# Patient Record
Sex: FEMALE | Race: WHITE | Employment: UNEMPLOYED | URBAN - NONMETROPOLITAN AREA
[De-identification: names, ages, dates, MRNs, and addresses within clinical notes are randomized per-mention and may not be internally consistent; named-entity substitution may affect disease eponyms.]

---

## 2017-01-04 ENCOUNTER — ANTI-COAG VISIT (OUTPATIENT)
Dept: INTERNAL MEDICINE | Age: 82
End: 2017-01-04

## 2017-01-16 ENCOUNTER — ANTI-COAG VISIT (OUTPATIENT)
Dept: INTERNAL MEDICINE | Age: 82
End: 2017-01-16

## 2017-01-16 DIAGNOSIS — E05.90 HYPERTHYROIDISM: Primary | ICD-10-CM

## 2017-01-18 ENCOUNTER — OFFICE VISIT (OUTPATIENT)
Dept: INTERNAL MEDICINE | Age: 82
End: 2017-01-18

## 2017-01-18 VITALS
SYSTOLIC BLOOD PRESSURE: 130 MMHG | HEIGHT: 67 IN | DIASTOLIC BLOOD PRESSURE: 58 MMHG | WEIGHT: 151.8 LBS | BODY MASS INDEX: 23.83 KG/M2 | HEART RATE: 76 BPM

## 2017-01-18 DIAGNOSIS — I10 ESSENTIAL HYPERTENSION: ICD-10-CM

## 2017-01-18 DIAGNOSIS — I87.2 CHRONIC VENOUS INSUFFICIENCY: ICD-10-CM

## 2017-01-18 DIAGNOSIS — I34.0 MITRAL VALVE INSUFFICIENCY, UNSPECIFIED ETIOLOGY: Chronic | ICD-10-CM

## 2017-01-18 DIAGNOSIS — E78.5 DYSLIPIDEMIA: ICD-10-CM

## 2017-01-18 DIAGNOSIS — M85.80 OSTEOPENIA: ICD-10-CM

## 2017-01-18 DIAGNOSIS — I42.9 CARDIOMYOPATHY (HCC): ICD-10-CM

## 2017-01-18 DIAGNOSIS — I25.810 CORONARY ARTERY DISEASE INVOLVING CORONARY BYPASS GRAFT OF NATIVE HEART WITHOUT ANGINA PECTORIS: Chronic | ICD-10-CM

## 2017-01-18 DIAGNOSIS — E11.21 DIABETIC NEPHROPATHY ASSOCIATED WITH TYPE 2 DIABETES MELLITUS (HCC): ICD-10-CM

## 2017-01-18 DIAGNOSIS — E05.90 HYPERTHYROIDISM: Primary | ICD-10-CM

## 2017-01-18 DIAGNOSIS — E11.51 DIABETES MELLITUS WITH PERIPHERAL VASCULAR DISEASE (HCC): ICD-10-CM

## 2017-01-18 DIAGNOSIS — Z23 NEED FOR PROPHYLACTIC VACCINATION AGAINST STREPTOCOCCUS PNEUMONIAE (PNEUMOCOCCUS): ICD-10-CM

## 2017-01-18 DIAGNOSIS — I77.9 BILATERAL CAROTID ARTERY DISEASE (HCC): ICD-10-CM

## 2017-01-18 DIAGNOSIS — I48.20 CHRONIC ATRIAL FIBRILLATION (HCC): ICD-10-CM

## 2017-01-18 PROCEDURE — 4040F PNEUMOC VAC/ADMIN/RCVD: CPT | Performed by: INTERNAL MEDICINE

## 2017-01-18 PROCEDURE — G8598 ASA/ANTIPLAT THER USED: HCPCS | Performed by: INTERNAL MEDICINE

## 2017-01-18 PROCEDURE — G8400 PT W/DXA NO RESULTS DOC: HCPCS | Performed by: INTERNAL MEDICINE

## 2017-01-18 PROCEDURE — 99214 OFFICE O/P EST MOD 30 MIN: CPT | Performed by: INTERNAL MEDICINE

## 2017-01-18 PROCEDURE — G8420 CALC BMI NORM PARAMETERS: HCPCS | Performed by: INTERNAL MEDICINE

## 2017-01-18 PROCEDURE — 1036F TOBACCO NON-USER: CPT | Performed by: INTERNAL MEDICINE

## 2017-01-18 PROCEDURE — 1090F PRES/ABSN URINE INCON ASSESS: CPT | Performed by: INTERNAL MEDICINE

## 2017-01-18 PROCEDURE — G0009 ADMIN PNEUMOCOCCAL VACCINE: HCPCS | Performed by: INTERNAL MEDICINE

## 2017-01-18 PROCEDURE — G8484 FLU IMMUNIZE NO ADMIN: HCPCS | Performed by: INTERNAL MEDICINE

## 2017-01-18 PROCEDURE — 90732 PPSV23 VACC 2 YRS+ SUBQ/IM: CPT | Performed by: INTERNAL MEDICINE

## 2017-01-18 PROCEDURE — 1123F ACP DISCUSS/DSCN MKR DOCD: CPT | Performed by: INTERNAL MEDICINE

## 2017-01-18 PROCEDURE — G8427 DOCREV CUR MEDS BY ELIG CLIN: HCPCS | Performed by: INTERNAL MEDICINE

## 2017-01-18 ASSESSMENT — ENCOUNTER SYMPTOMS
WHEEZING: 0
EYE PAIN: 0
NAUSEA: 0
DIARRHEA: 0
DOUBLE VISION: 0
ABDOMINAL PAIN: 0
BLOOD IN STOOL: 0
CONSTIPATION: 0
SHORTNESS OF BREATH: 0
BLURRED VISION: 0
SORE THROAT: 0
VOMITING: 0
COUGH: 0
EYE DISCHARGE: 0

## 2017-01-25 ENCOUNTER — ANTI-COAG VISIT (OUTPATIENT)
Dept: INTERNAL MEDICINE | Age: 82
End: 2017-01-25

## 2017-01-25 DIAGNOSIS — E05.90 HYPERTHYROIDISM: Primary | ICD-10-CM

## 2017-01-31 ENCOUNTER — ANTI-COAG VISIT (OUTPATIENT)
Dept: INTERNAL MEDICINE | Age: 82
End: 2017-01-31

## 2017-01-31 DIAGNOSIS — E05.90 HYPERTHYROIDISM: Primary | ICD-10-CM

## 2017-02-06 ENCOUNTER — ANTI-COAG VISIT (OUTPATIENT)
Dept: INTERNAL MEDICINE | Age: 82
End: 2017-02-06

## 2017-02-13 ENCOUNTER — ANTI-COAG VISIT (OUTPATIENT)
Dept: INTERNAL MEDICINE | Age: 82
End: 2017-02-13

## 2017-02-20 ENCOUNTER — ANTI-COAG VISIT (OUTPATIENT)
Dept: INTERNAL MEDICINE | Age: 82
End: 2017-02-20

## 2017-02-22 ENCOUNTER — OFFICE VISIT (OUTPATIENT)
Dept: CARDIOLOGY | Age: 82
End: 2017-02-22

## 2017-02-22 VITALS
BODY MASS INDEX: 25.95 KG/M2 | HEART RATE: 84 BPM | DIASTOLIC BLOOD PRESSURE: 62 MMHG | HEIGHT: 64 IN | SYSTOLIC BLOOD PRESSURE: 112 MMHG | WEIGHT: 152 LBS

## 2017-02-22 DIAGNOSIS — I25.810 CORONARY ARTERY DISEASE INVOLVING CORONARY BYPASS GRAFT OF NATIVE HEART WITHOUT ANGINA PECTORIS: Primary | Chronic | ICD-10-CM

## 2017-02-22 DIAGNOSIS — I48.20 CHRONIC ATRIAL FIBRILLATION (HCC): ICD-10-CM

## 2017-02-22 DIAGNOSIS — Z95.1 S/P CABG (CORONARY ARTERY BYPASS GRAFT): ICD-10-CM

## 2017-02-22 DIAGNOSIS — E78.5 DYSLIPIDEMIA: ICD-10-CM

## 2017-02-22 DIAGNOSIS — I49.5 SSS (SICK SINUS SYNDROME) (HCC): ICD-10-CM

## 2017-02-22 PROCEDURE — 4040F PNEUMOC VAC/ADMIN/RCVD: CPT | Performed by: INTERNAL MEDICINE

## 2017-02-22 PROCEDURE — 1090F PRES/ABSN URINE INCON ASSESS: CPT | Performed by: INTERNAL MEDICINE

## 2017-02-22 PROCEDURE — 1123F ACP DISCUSS/DSCN MKR DOCD: CPT | Performed by: INTERNAL MEDICINE

## 2017-02-22 PROCEDURE — 1036F TOBACCO NON-USER: CPT | Performed by: INTERNAL MEDICINE

## 2017-02-22 PROCEDURE — 93000 ELECTROCARDIOGRAM COMPLETE: CPT | Performed by: INTERNAL MEDICINE

## 2017-02-22 PROCEDURE — G8484 FLU IMMUNIZE NO ADMIN: HCPCS | Performed by: INTERNAL MEDICINE

## 2017-02-22 PROCEDURE — G8420 CALC BMI NORM PARAMETERS: HCPCS | Performed by: INTERNAL MEDICINE

## 2017-02-22 PROCEDURE — G8400 PT W/DXA NO RESULTS DOC: HCPCS | Performed by: INTERNAL MEDICINE

## 2017-02-22 PROCEDURE — G8427 DOCREV CUR MEDS BY ELIG CLIN: HCPCS | Performed by: INTERNAL MEDICINE

## 2017-02-22 PROCEDURE — 99214 OFFICE O/P EST MOD 30 MIN: CPT | Performed by: INTERNAL MEDICINE

## 2017-02-22 PROCEDURE — G8598 ASA/ANTIPLAT THER USED: HCPCS | Performed by: INTERNAL MEDICINE

## 2017-03-06 ENCOUNTER — ANTI-COAG VISIT (OUTPATIENT)
Dept: INTERNAL MEDICINE | Age: 82
End: 2017-03-06

## 2017-03-06 DIAGNOSIS — E05.90 HYPERTHYROIDISM: Primary | ICD-10-CM

## 2017-03-23 ENCOUNTER — PROCEDURE VISIT (OUTPATIENT)
Dept: CARDIOLOGY | Age: 82
End: 2017-03-23
Payer: MEDICARE

## 2017-03-23 ENCOUNTER — ANTI-COAG VISIT (OUTPATIENT)
Dept: INTERNAL MEDICINE | Age: 82
End: 2017-03-23

## 2017-03-23 DIAGNOSIS — I48.20 CHRONIC ATRIAL FIBRILLATION (HCC): ICD-10-CM

## 2017-03-23 DIAGNOSIS — Z95.810 PRESENCE OF AUTOMATIC CARDIOVERTER/DEFIBRILLATOR (AICD): Primary | ICD-10-CM

## 2017-03-23 DIAGNOSIS — I49.5 SSS (SICK SINUS SYNDROME) (HCC): ICD-10-CM

## 2017-03-23 DIAGNOSIS — I50.9 CONGESTIVE HEART FAILURE, UNSPECIFIED CONGESTIVE HEART FAILURE CHRONICITY, UNSPECIFIED CONGESTIVE HEART FAILURE TYPE: ICD-10-CM

## 2017-03-23 PROCEDURE — 93289 INTERROG DEVICE EVAL HEART: CPT | Performed by: INTERNAL MEDICINE

## 2017-04-03 RX ORDER — SIMVASTATIN 80 MG
TABLET ORAL
Qty: 90 TABLET | Refills: 3 | Status: SHIPPED | OUTPATIENT
Start: 2017-04-03 | End: 2018-03-29 | Stop reason: SDUPTHER

## 2017-04-05 ENCOUNTER — ANTI-COAG VISIT (OUTPATIENT)
Dept: INTERNAL MEDICINE | Age: 82
End: 2017-04-05

## 2017-04-19 ENCOUNTER — ANTI-COAG VISIT (OUTPATIENT)
Dept: INTERNAL MEDICINE | Age: 82
End: 2017-04-19

## 2017-04-20 ENCOUNTER — OFFICE VISIT (OUTPATIENT)
Dept: INTERNAL MEDICINE | Age: 82
End: 2017-04-20
Payer: MEDICARE

## 2017-04-20 VITALS
HEIGHT: 67 IN | BODY MASS INDEX: 24.17 KG/M2 | HEART RATE: 72 BPM | SYSTOLIC BLOOD PRESSURE: 110 MMHG | DIASTOLIC BLOOD PRESSURE: 60 MMHG | WEIGHT: 154 LBS

## 2017-04-20 DIAGNOSIS — I42.9 CARDIOMYOPATHY (HCC): ICD-10-CM

## 2017-04-20 DIAGNOSIS — E78.5 DYSLIPIDEMIA: ICD-10-CM

## 2017-04-20 DIAGNOSIS — M85.80 OSTEOPENIA: ICD-10-CM

## 2017-04-20 DIAGNOSIS — Z00.00 ROUTINE GENERAL MEDICAL EXAMINATION AT A HEALTH CARE FACILITY: ICD-10-CM

## 2017-04-20 DIAGNOSIS — I48.20 CHRONIC ATRIAL FIBRILLATION (HCC): ICD-10-CM

## 2017-04-20 DIAGNOSIS — I25.810 CORONARY ARTERY DISEASE INVOLVING CORONARY BYPASS GRAFT OF NATIVE HEART WITHOUT ANGINA PECTORIS: Chronic | ICD-10-CM

## 2017-04-20 DIAGNOSIS — D17.24 BENIGN LIPOMATOUS NEOPLASM OF SKIN AND SUBCUTANEOUS TISSUE OF LEFT LEG: ICD-10-CM

## 2017-04-20 DIAGNOSIS — I10 ESSENTIAL HYPERTENSION: ICD-10-CM

## 2017-04-20 DIAGNOSIS — I34.0 MITRAL VALVE INSUFFICIENCY, UNSPECIFIED ETIOLOGY: Chronic | ICD-10-CM

## 2017-04-20 DIAGNOSIS — E11.51 DIABETES MELLITUS WITH PERIPHERAL VASCULAR DISEASE (HCC): ICD-10-CM

## 2017-04-20 DIAGNOSIS — E27.8 ADRENAL MASS (HCC): ICD-10-CM

## 2017-04-20 DIAGNOSIS — E05.90 HYPERTHYROIDISM: Primary | ICD-10-CM

## 2017-04-20 DIAGNOSIS — I87.2 CHRONIC VENOUS INSUFFICIENCY: ICD-10-CM

## 2017-04-20 PROCEDURE — 1090F PRES/ABSN URINE INCON ASSESS: CPT | Performed by: INTERNAL MEDICINE

## 2017-04-20 PROCEDURE — 99214 OFFICE O/P EST MOD 30 MIN: CPT | Performed by: INTERNAL MEDICINE

## 2017-04-20 PROCEDURE — 4040F PNEUMOC VAC/ADMIN/RCVD: CPT | Performed by: INTERNAL MEDICINE

## 2017-04-20 PROCEDURE — G8598 ASA/ANTIPLAT THER USED: HCPCS | Performed by: INTERNAL MEDICINE

## 2017-04-20 PROCEDURE — G8427 DOCREV CUR MEDS BY ELIG CLIN: HCPCS | Performed by: INTERNAL MEDICINE

## 2017-04-20 PROCEDURE — 1123F ACP DISCUSS/DSCN MKR DOCD: CPT | Performed by: INTERNAL MEDICINE

## 2017-04-20 PROCEDURE — G8400 PT W/DXA NO RESULTS DOC: HCPCS | Performed by: INTERNAL MEDICINE

## 2017-04-20 PROCEDURE — G8420 CALC BMI NORM PARAMETERS: HCPCS | Performed by: INTERNAL MEDICINE

## 2017-04-20 PROCEDURE — 1036F TOBACCO NON-USER: CPT | Performed by: INTERNAL MEDICINE

## 2017-04-20 PROCEDURE — G0438 PPPS, INITIAL VISIT: HCPCS | Performed by: INTERNAL MEDICINE

## 2017-04-20 ASSESSMENT — ANXIETY QUESTIONNAIRES: GAD7 TOTAL SCORE: 0

## 2017-04-20 ASSESSMENT — PATIENT HEALTH QUESTIONNAIRE - PHQ9: SUM OF ALL RESPONSES TO PHQ QUESTIONS 1-9: 0

## 2017-04-20 ASSESSMENT — LIFESTYLE VARIABLES: HOW OFTEN DO YOU HAVE A DRINK CONTAINING ALCOHOL: 0

## 2017-04-23 ASSESSMENT — ENCOUNTER SYMPTOMS
WHEEZING: 0
VOMITING: 0
NAUSEA: 0
CONSTIPATION: 0
ABDOMINAL PAIN: 0
COUGH: 0
DOUBLE VISION: 0
EYE DISCHARGE: 0
SORE THROAT: 0
DIARRHEA: 0
BLOOD IN STOOL: 0
BLURRED VISION: 0
SHORTNESS OF BREATH: 0
EYE PAIN: 0

## 2017-05-03 ENCOUNTER — ANTI-COAG VISIT (OUTPATIENT)
Dept: INTERNAL MEDICINE | Age: 82
End: 2017-05-03

## 2017-05-04 ENCOUNTER — TELEPHONE (OUTPATIENT)
Dept: INTERNAL MEDICINE | Age: 82
End: 2017-05-04

## 2017-05-17 ENCOUNTER — ANTI-COAG VISIT (OUTPATIENT)
Dept: INTERNAL MEDICINE | Age: 82
End: 2017-05-17

## 2017-05-31 ENCOUNTER — ANTI-COAG VISIT (OUTPATIENT)
Dept: INTERNAL MEDICINE | Age: 82
End: 2017-05-31

## 2017-05-31 DIAGNOSIS — E05.90 HYPERTHYROIDISM: Primary | ICD-10-CM

## 2017-06-05 RX ORDER — WARFARIN SODIUM 3 MG/1
3 TABLET ORAL DAILY
Qty: 90 TABLET | Refills: 3 | Status: SHIPPED | OUTPATIENT
Start: 2017-06-05 | End: 2017-09-25 | Stop reason: SDUPTHER

## 2017-06-12 ENCOUNTER — TELEPHONE (OUTPATIENT)
Dept: INTERNAL MEDICINE | Age: 82
End: 2017-06-12

## 2017-06-14 ENCOUNTER — ANTI-COAG VISIT (OUTPATIENT)
Dept: INTERNAL MEDICINE | Age: 82
End: 2017-06-14

## 2017-06-21 ENCOUNTER — ANTI-COAG VISIT (OUTPATIENT)
Dept: INTERNAL MEDICINE | Age: 82
End: 2017-06-21

## 2017-06-28 ENCOUNTER — ANTI-COAG VISIT (OUTPATIENT)
Dept: INTERNAL MEDICINE | Age: 82
End: 2017-06-28

## 2017-07-05 ENCOUNTER — ANTI-COAG VISIT (OUTPATIENT)
Dept: INTERNAL MEDICINE | Age: 82
End: 2017-07-05

## 2017-07-17 ENCOUNTER — ANTI-COAG VISIT (OUTPATIENT)
Dept: INTERNAL MEDICINE | Age: 82
End: 2017-07-17

## 2017-07-17 RX ORDER — DIGOXIN 125 MCG
125 TABLET ORAL DAILY
Qty: 14 TABLET | Refills: 0 | Status: SHIPPED | OUTPATIENT
Start: 2017-07-17 | End: 2017-07-19 | Stop reason: SDUPTHER

## 2017-07-18 RX ORDER — DIGOXIN 125 MCG
TABLET ORAL
Qty: 90 TABLET | Refills: 2 | OUTPATIENT
Start: 2017-07-18

## 2017-07-19 RX ORDER — DIGOXIN 125 MCG
125 TABLET ORAL DAILY
Qty: 90 TABLET | Refills: 3 | Status: SHIPPED | OUTPATIENT
Start: 2017-07-19 | End: 2018-01-01 | Stop reason: SDUPTHER

## 2017-07-21 ENCOUNTER — OFFICE VISIT (OUTPATIENT)
Dept: INTERNAL MEDICINE | Age: 82
End: 2017-07-21
Payer: MEDICARE

## 2017-07-21 VITALS
SYSTOLIC BLOOD PRESSURE: 110 MMHG | HEART RATE: 62 BPM | RESPIRATION RATE: 17 BRPM | DIASTOLIC BLOOD PRESSURE: 60 MMHG | BODY MASS INDEX: 24.01 KG/M2 | HEIGHT: 67 IN | WEIGHT: 153 LBS

## 2017-07-21 DIAGNOSIS — I48.20 CHRONIC ATRIAL FIBRILLATION (HCC): ICD-10-CM

## 2017-07-21 DIAGNOSIS — E11.51 DIABETES MELLITUS WITH PERIPHERAL VASCULAR DISEASE (HCC): ICD-10-CM

## 2017-07-21 DIAGNOSIS — E05.90 HYPERTHYROIDISM: Primary | ICD-10-CM

## 2017-07-21 DIAGNOSIS — I10 ESSENTIAL HYPERTENSION: ICD-10-CM

## 2017-07-21 DIAGNOSIS — M85.80 OSTEOPENIA: ICD-10-CM

## 2017-07-21 DIAGNOSIS — I42.9 CARDIOMYOPATHY (HCC): ICD-10-CM

## 2017-07-21 DIAGNOSIS — I34.0 MITRAL VALVE INSUFFICIENCY, UNSPECIFIED ETIOLOGY: Chronic | ICD-10-CM

## 2017-07-21 DIAGNOSIS — I77.9 BILATERAL CAROTID ARTERY DISEASE (HCC): ICD-10-CM

## 2017-07-21 DIAGNOSIS — E78.5 DYSLIPIDEMIA: ICD-10-CM

## 2017-07-21 PROCEDURE — G8427 DOCREV CUR MEDS BY ELIG CLIN: HCPCS | Performed by: INTERNAL MEDICINE

## 2017-07-21 PROCEDURE — G8420 CALC BMI NORM PARAMETERS: HCPCS | Performed by: INTERNAL MEDICINE

## 2017-07-21 PROCEDURE — 1090F PRES/ABSN URINE INCON ASSESS: CPT | Performed by: INTERNAL MEDICINE

## 2017-07-21 PROCEDURE — 4040F PNEUMOC VAC/ADMIN/RCVD: CPT | Performed by: INTERNAL MEDICINE

## 2017-07-21 PROCEDURE — 99214 OFFICE O/P EST MOD 30 MIN: CPT | Performed by: INTERNAL MEDICINE

## 2017-07-21 PROCEDURE — G8598 ASA/ANTIPLAT THER USED: HCPCS | Performed by: INTERNAL MEDICINE

## 2017-07-21 PROCEDURE — 1123F ACP DISCUSS/DSCN MKR DOCD: CPT | Performed by: INTERNAL MEDICINE

## 2017-07-21 PROCEDURE — 1036F TOBACCO NON-USER: CPT | Performed by: INTERNAL MEDICINE

## 2017-07-23 ASSESSMENT — ENCOUNTER SYMPTOMS
SHORTNESS OF BREATH: 0
DIARRHEA: 0
EYE DISCHARGE: 0
CONSTIPATION: 0
SORE THROAT: 0
EYE PAIN: 0
WHEEZING: 0
BLURRED VISION: 0
COUGH: 0
NAUSEA: 0
BLOOD IN STOOL: 0
VOMITING: 0
ABDOMINAL PAIN: 0
DOUBLE VISION: 0

## 2017-08-01 ENCOUNTER — ANTI-COAG VISIT (OUTPATIENT)
Dept: INTERNAL MEDICINE | Age: 82
End: 2017-08-01

## 2017-08-01 ENCOUNTER — TELEPHONE (OUTPATIENT)
Dept: INTERNAL MEDICINE | Age: 82
End: 2017-08-01

## 2017-08-01 DIAGNOSIS — E05.90 HYPERTHYROIDISM: Primary | ICD-10-CM

## 2017-08-01 DIAGNOSIS — I48.91 ATRIAL FIBRILLATION (HCC): ICD-10-CM

## 2017-08-01 DIAGNOSIS — E05.90 HYPERTHYROIDISM: ICD-10-CM

## 2017-08-01 LAB
INR BLD: 1.1
PROTHROMBIN TIME: 12 SEC (ref 9.4–11.3)
THYROXINE, FREE: 1.18 NG/DL (ref 0.93–1.7)
TSH SERPL DL<=0.05 MIU/L-ACNC: 0.05 MIU/L (ref 0.3–5)

## 2017-08-01 PROCEDURE — 84439 ASSAY OF FREE THYROXINE: CPT | Performed by: INTERNAL MEDICINE

## 2017-08-01 PROCEDURE — 36415 COLL VENOUS BLD VENIPUNCTURE: CPT | Performed by: INTERNAL MEDICINE

## 2017-08-01 PROCEDURE — 85610 PROTHROMBIN TIME: CPT | Performed by: INTERNAL MEDICINE

## 2017-08-01 PROCEDURE — 84443 ASSAY THYROID STIM HORMONE: CPT | Performed by: INTERNAL MEDICINE

## 2017-08-15 ENCOUNTER — HOSPITAL ENCOUNTER (OUTPATIENT)
Dept: LAB | Age: 82
Discharge: HOME | End: 2017-08-15
Payer: MEDICARE

## 2017-08-15 ENCOUNTER — ANTI-COAG VISIT (OUTPATIENT)
Dept: INTERNAL MEDICINE | Age: 82
End: 2017-08-15

## 2017-08-15 DIAGNOSIS — E05.90 HYPERTHYROIDISM: ICD-10-CM

## 2017-08-15 DIAGNOSIS — I48.91 ATRIAL FIBRILLATION (HCC): ICD-10-CM

## 2017-08-15 DIAGNOSIS — I48.20 CHRONIC ATRIAL FIBRILLATION (HCC): Primary | ICD-10-CM

## 2017-08-15 LAB
INR BLD: 1.8
PROTHROMBIN TIME: 19.5 SEC (ref 9.4–11.3)
THYROXINE, FREE: 1.12 NG/DL (ref 0.93–1.7)
TSH SERPL DL<=0.05 MIU/L-ACNC: 0.08 MIU/L (ref 0.3–5)

## 2017-08-15 PROCEDURE — 84443 ASSAY THYROID STIM HORMONE: CPT

## 2017-08-15 PROCEDURE — 85610 PROTHROMBIN TIME: CPT

## 2017-08-15 PROCEDURE — 36415 COLL VENOUS BLD VENIPUNCTURE: CPT

## 2017-08-15 PROCEDURE — 84439 ASSAY OF FREE THYROXINE: CPT

## 2017-08-29 ENCOUNTER — HOSPITAL ENCOUNTER (OUTPATIENT)
Dept: LAB | Age: 82
Discharge: HOME | End: 2017-08-29
Payer: MEDICARE

## 2017-08-29 ENCOUNTER — ANTI-COAG VISIT (OUTPATIENT)
Dept: INTERNAL MEDICINE | Age: 82
End: 2017-08-29

## 2017-08-29 DIAGNOSIS — I48.91 ATRIAL FIBRILLATION (HCC): ICD-10-CM

## 2017-08-29 LAB
INR BLD: 2.5
PROTHROMBIN TIME: 26.4 SEC (ref 9.4–11.3)

## 2017-08-29 PROCEDURE — 36415 COLL VENOUS BLD VENIPUNCTURE: CPT

## 2017-08-29 PROCEDURE — 85610 PROTHROMBIN TIME: CPT

## 2017-09-05 RX ORDER — METOPROLOL TARTRATE 50 MG/1
TABLET, FILM COATED ORAL
Qty: 180 TABLET | Refills: 3 | Status: SHIPPED | OUTPATIENT
Start: 2017-09-05 | End: 2018-01-01 | Stop reason: SDUPTHER

## 2017-09-06 ENCOUNTER — OFFICE VISIT (OUTPATIENT)
Dept: CARDIOLOGY | Age: 82
End: 2017-09-06
Payer: MEDICARE

## 2017-09-06 VITALS
WEIGHT: 155 LBS | DIASTOLIC BLOOD PRESSURE: 60 MMHG | HEART RATE: 87 BPM | BODY MASS INDEX: 24.62 KG/M2 | SYSTOLIC BLOOD PRESSURE: 120 MMHG

## 2017-09-06 DIAGNOSIS — N18.2 CHRONIC KIDNEY DISEASE, STAGE 2 (MILD): ICD-10-CM

## 2017-09-06 DIAGNOSIS — I25.5 ISCHEMIC CARDIOMYOPATHY: ICD-10-CM

## 2017-09-06 DIAGNOSIS — E78.5 DYSLIPIDEMIA: ICD-10-CM

## 2017-09-06 DIAGNOSIS — E11.51 DIABETES MELLITUS WITH PERIPHERAL VASCULAR DISEASE (HCC): ICD-10-CM

## 2017-09-06 DIAGNOSIS — I34.0 MITRAL VALVE INSUFFICIENCY, UNSPECIFIED ETIOLOGY: Chronic | ICD-10-CM

## 2017-09-06 DIAGNOSIS — I10 ESSENTIAL HYPERTENSION: ICD-10-CM

## 2017-09-06 DIAGNOSIS — I27.20 PULMONARY HTN (HCC): ICD-10-CM

## 2017-09-06 DIAGNOSIS — I48.20 CHRONIC ATRIAL FIBRILLATION (HCC): ICD-10-CM

## 2017-09-06 DIAGNOSIS — I77.9 BILATERAL CAROTID ARTERY DISEASE (HCC): ICD-10-CM

## 2017-09-06 DIAGNOSIS — E05.90 HYPERTHYROIDISM: ICD-10-CM

## 2017-09-06 DIAGNOSIS — I25.810 CORONARY ARTERY DISEASE INVOLVING CORONARY BYPASS GRAFT OF NATIVE HEART WITHOUT ANGINA PECTORIS: Primary | Chronic | ICD-10-CM

## 2017-09-06 PROCEDURE — G8598 ASA/ANTIPLAT THER USED: HCPCS | Performed by: INTERNAL MEDICINE

## 2017-09-06 PROCEDURE — 1036F TOBACCO NON-USER: CPT | Performed by: INTERNAL MEDICINE

## 2017-09-06 PROCEDURE — 4040F PNEUMOC VAC/ADMIN/RCVD: CPT | Performed by: INTERNAL MEDICINE

## 2017-09-06 PROCEDURE — 1123F ACP DISCUSS/DSCN MKR DOCD: CPT | Performed by: INTERNAL MEDICINE

## 2017-09-06 PROCEDURE — 1090F PRES/ABSN URINE INCON ASSESS: CPT | Performed by: INTERNAL MEDICINE

## 2017-09-06 PROCEDURE — 93000 ELECTROCARDIOGRAM COMPLETE: CPT | Performed by: INTERNAL MEDICINE

## 2017-09-06 PROCEDURE — G8427 DOCREV CUR MEDS BY ELIG CLIN: HCPCS | Performed by: INTERNAL MEDICINE

## 2017-09-06 PROCEDURE — 99213 OFFICE O/P EST LOW 20 MIN: CPT | Performed by: INTERNAL MEDICINE

## 2017-09-06 PROCEDURE — G8420 CALC BMI NORM PARAMETERS: HCPCS | Performed by: INTERNAL MEDICINE

## 2017-09-06 RX ORDER — METHIMAZOLE 10 MG/1
5 TABLET ORAL 2 TIMES DAILY
Qty: 30 TABLET | Refills: 11 | Status: SHIPPED | OUTPATIENT
Start: 2017-09-06 | End: 2017-10-02

## 2017-09-06 RX ORDER — SPIRONOLACTONE 25 MG/1
TABLET ORAL
Qty: 45 TABLET | Refills: 3 | Status: SHIPPED | OUTPATIENT
Start: 2017-09-06 | End: 2017-10-25 | Stop reason: ALTCHOICE

## 2017-09-06 RX ORDER — FUROSEMIDE 40 MG/1
TABLET ORAL
Qty: 180 TABLET | Refills: 3 | Status: SHIPPED | OUTPATIENT
Start: 2017-09-06 | End: 2017-12-13 | Stop reason: SDUPTHER

## 2017-09-07 RX ORDER — RAMIPRIL 10 MG/1
CAPSULE ORAL
Qty: 90 CAPSULE | Refills: 3 | Status: SHIPPED | OUTPATIENT
Start: 2017-09-07 | End: 2017-09-12 | Stop reason: SDUPTHER

## 2017-09-11 ENCOUNTER — ANTI-COAG VISIT (OUTPATIENT)
Dept: INTERNAL MEDICINE | Age: 82
End: 2017-09-11

## 2017-09-11 ENCOUNTER — HOSPITAL ENCOUNTER (OUTPATIENT)
Dept: LAB | Age: 82
Discharge: HOME | End: 2017-09-11
Payer: MEDICARE

## 2017-09-11 DIAGNOSIS — E05.90 HYPERTHYROIDISM: Primary | ICD-10-CM

## 2017-09-11 DIAGNOSIS — I48.20 CHRONIC ATRIAL FIBRILLATION (HCC): Primary | ICD-10-CM

## 2017-09-11 DIAGNOSIS — I48.20 CHRONIC ATRIAL FIBRILLATION (HCC): ICD-10-CM

## 2017-09-11 DIAGNOSIS — E05.90 HYPERTHYROIDISM: ICD-10-CM

## 2017-09-11 LAB
INR BLD: 1.8
PROTHROMBIN TIME: 19.4 SEC (ref 9.4–11.3)
THYROXINE, FREE: 0.98 NG/DL (ref 0.93–1.7)
TSH SERPL DL<=0.05 MIU/L-ACNC: 0.36 MIU/L (ref 0.3–5)

## 2017-09-11 PROCEDURE — 85610 PROTHROMBIN TIME: CPT

## 2017-09-11 PROCEDURE — 36415 COLL VENOUS BLD VENIPUNCTURE: CPT

## 2017-09-11 PROCEDURE — 84443 ASSAY THYROID STIM HORMONE: CPT

## 2017-09-11 PROCEDURE — 84439 ASSAY OF FREE THYROXINE: CPT

## 2017-09-12 RX ORDER — RAMIPRIL 10 MG/1
CAPSULE ORAL
Qty: 14 CAPSULE | Refills: 0 | Status: SHIPPED | OUTPATIENT
Start: 2017-09-12 | End: 2018-01-01 | Stop reason: SDUPTHER

## 2017-09-25 ENCOUNTER — NURSE ONLY (OUTPATIENT)
Dept: LAB | Age: 82
End: 2017-09-25
Payer: MEDICARE

## 2017-09-25 ENCOUNTER — HOSPITAL ENCOUNTER (OUTPATIENT)
Dept: LAB | Age: 82
Setting detail: SPECIMEN
Discharge: HOME | End: 2017-09-25
Payer: MEDICARE

## 2017-09-25 ENCOUNTER — ANTI-COAG VISIT (OUTPATIENT)
Dept: INTERNAL MEDICINE | Age: 82
End: 2017-09-25

## 2017-09-25 DIAGNOSIS — Z23 NEED FOR VACCINATION: Primary | ICD-10-CM

## 2017-09-25 DIAGNOSIS — I48.20 CHRONIC ATRIAL FIBRILLATION (HCC): ICD-10-CM

## 2017-09-25 LAB
INR BLD: 2.2
PROTHROMBIN TIME: 23.6 SEC (ref 9.4–11.3)

## 2017-09-25 PROCEDURE — 36415 COLL VENOUS BLD VENIPUNCTURE: CPT

## 2017-09-25 PROCEDURE — 90662 IIV NO PRSV INCREASED AG IM: CPT | Performed by: INTERNAL MEDICINE

## 2017-09-25 PROCEDURE — G0008 ADMIN INFLUENZA VIRUS VAC: HCPCS | Performed by: INTERNAL MEDICINE

## 2017-09-25 PROCEDURE — 99999 PR OFFICE/OUTPT VISIT,PROCEDURE ONLY: CPT | Performed by: INTERNAL MEDICINE

## 2017-09-25 PROCEDURE — 85610 PROTHROMBIN TIME: CPT

## 2017-09-25 RX ORDER — WARFARIN SODIUM 3 MG/1
3 TABLET ORAL DAILY
Qty: 30 TABLET | Refills: 3 | Status: SHIPPED | OUTPATIENT
Start: 2017-09-25 | End: 2017-10-25 | Stop reason: SDUPTHER

## 2017-09-28 ENCOUNTER — PROCEDURE VISIT (OUTPATIENT)
Dept: CARDIOLOGY | Age: 82
End: 2017-09-28
Payer: MEDICARE

## 2017-09-28 DIAGNOSIS — Z95.810 PRESENCE OF AUTOMATIC CARDIOVERTER/DEFIBRILLATOR (AICD): Primary | ICD-10-CM

## 2017-09-28 DIAGNOSIS — I49.5 SSS (SICK SINUS SYNDROME) (HCC): ICD-10-CM

## 2017-09-28 PROCEDURE — 93289 INTERROG DEVICE EVAL HEART: CPT | Performed by: INTERNAL MEDICINE

## 2017-10-02 ENCOUNTER — TELEPHONE (OUTPATIENT)
Dept: INTERNAL MEDICINE | Age: 82
End: 2017-10-02

## 2017-10-02 RX ORDER — POTASSIUM CHLORIDE 20 MEQ/1
TABLET, EXTENDED RELEASE ORAL
Qty: 360 TABLET | Refills: 3 | Status: SHIPPED | OUTPATIENT
Start: 2017-10-02 | End: 2017-10-25 | Stop reason: ALTCHOICE

## 2017-10-02 RX ORDER — POTASSIUM CHLORIDE 20 MEQ/1
TABLET, EXTENDED RELEASE ORAL
Qty: 360 TABLET | Refills: 3 | OUTPATIENT
Start: 2017-10-02

## 2017-10-02 RX ORDER — METHIMAZOLE 10 MG/1
TABLET ORAL
Qty: 90 TABLET | Refills: 3 | Status: SHIPPED | OUTPATIENT
Start: 2017-10-02 | End: 2018-01-01 | Stop reason: ALTCHOICE

## 2017-10-02 RX ORDER — METHIMAZOLE 10 MG/1
TABLET ORAL
Qty: 180 TABLET | Refills: 3 | Status: SHIPPED | OUTPATIENT
Start: 2017-10-02 | End: 2017-10-02 | Stop reason: CLARIF

## 2017-10-02 NOTE — TELEPHONE ENCOUNTER
Noted 8/1/17 - Pt increased back to 5mg BID from 5mg QD. Rx sent in today shows 10mg BID. Please advise.

## 2017-10-09 ENCOUNTER — ANTI-COAG VISIT (OUTPATIENT)
Dept: INTERNAL MEDICINE | Age: 82
End: 2017-10-09

## 2017-10-09 ENCOUNTER — HOSPITAL ENCOUNTER (OUTPATIENT)
Dept: LAB | Age: 82
Setting detail: SPECIMEN
Discharge: HOME | End: 2017-10-09
Payer: MEDICARE

## 2017-10-09 DIAGNOSIS — E11.51 DIABETES MELLITUS WITH PERIPHERAL VASCULAR DISEASE (HCC): ICD-10-CM

## 2017-10-09 DIAGNOSIS — I10 ESSENTIAL HYPERTENSION: ICD-10-CM

## 2017-10-09 DIAGNOSIS — I48.20 CHRONIC ATRIAL FIBRILLATION (HCC): ICD-10-CM

## 2017-10-09 LAB
ABSOLUTE EOS #: 0.1 K/UL (ref 0–0.4)
ABSOLUTE LYMPH #: 0.6 K/UL (ref 1–4.8)
ABSOLUTE MONO #: 0.3 K/UL (ref 0.1–1.2)
ANION GAP SERPL CALCULATED.3IONS-SCNC: 14 MMOL/L (ref 9–17)
BASOPHILS # BLD: 1 % (ref 0–1)
BASOPHILS ABSOLUTE: 0 K/UL (ref 0–0.2)
BUN BLDV-MCNC: 17 MG/DL (ref 8–23)
BUN/CREAT BLD: 18 (ref 9–20)
CALCIUM SERPL-MCNC: 9.9 MG/DL (ref 8.6–10.4)
CHLORIDE BLD-SCNC: 98 MMOL/L (ref 98–107)
CO2: 26 MMOL/L (ref 20–31)
CREAT SERPL-MCNC: 0.95 MG/DL (ref 0.5–0.9)
DIFFERENTIAL TYPE: ABNORMAL
EOSINOPHILS RELATIVE PERCENT: 2 % (ref 1–7)
ESTIMATED AVERAGE GLUCOSE: 131 MG/DL
GFR AFRICAN AMERICAN: >60 ML/MIN
GFR NON-AFRICAN AMERICAN: 56 ML/MIN
GFR SERPL CREATININE-BSD FRML MDRD: ABNORMAL ML/MIN/{1.73_M2}
GFR SERPL CREATININE-BSD FRML MDRD: ABNORMAL ML/MIN/{1.73_M2}
GLUCOSE BLD-MCNC: 167 MG/DL (ref 70–99)
HBA1C MFR BLD: 6.2 % (ref 4.8–5.9)
HCT VFR BLD CALC: 34.6 % (ref 36–46)
HEMOGLOBIN: 11.6 G/DL (ref 12–16)
INR BLD: 1.8
LYMPHOCYTES # BLD: 18 % (ref 16–46)
MCH RBC QN AUTO: 31.1 PG (ref 26–34)
MCHC RBC AUTO-ENTMCNC: 33.5 G/DL (ref 31–37)
MCV RBC AUTO: 92.8 FL (ref 80–100)
MONOCYTES # BLD: 10 % (ref 4–11)
PDW BLD-RTO: 14.2 % (ref 11–14.5)
PLATELET # BLD: 167 K/UL (ref 140–450)
PLATELET ESTIMATE: ABNORMAL
PMV BLD AUTO: 7.6 FL (ref 6–12)
POTASSIUM SERPL-SCNC: 5.7 MMOL/L (ref 3.7–5.3)
PROTHROMBIN TIME: 19.3 SEC (ref 9.4–11.3)
RBC # BLD: 3.73 M/UL (ref 4–5.2)
RBC # BLD: ABNORMAL 10*6/UL
SEG NEUTROPHILS: 69 % (ref 43–77)
SEGMENTED NEUTROPHILS ABSOLUTE COUNT: 2.2 K/UL (ref 1.8–7.7)
SODIUM BLD-SCNC: 138 MMOL/L (ref 135–144)
WBC # BLD: 3.2 K/UL (ref 3.5–11)
WBC # BLD: ABNORMAL 10*3/UL

## 2017-10-09 PROCEDURE — 83036 HEMOGLOBIN GLYCOSYLATED A1C: CPT

## 2017-10-09 PROCEDURE — 80048 BASIC METABOLIC PNL TOTAL CA: CPT

## 2017-10-09 PROCEDURE — 85610 PROTHROMBIN TIME: CPT

## 2017-10-09 PROCEDURE — 85025 COMPLETE CBC W/AUTO DIFF WBC: CPT

## 2017-10-09 PROCEDURE — 36415 COLL VENOUS BLD VENIPUNCTURE: CPT

## 2017-10-10 DIAGNOSIS — E87.5 HYPERKALEMIA: Primary | ICD-10-CM

## 2017-10-16 ENCOUNTER — ANTI-COAG VISIT (OUTPATIENT)
Dept: INTERNAL MEDICINE | Age: 82
End: 2017-10-16

## 2017-10-16 ENCOUNTER — HOSPITAL ENCOUNTER (OUTPATIENT)
Dept: LAB | Age: 82
Setting detail: SPECIMEN
Discharge: HOME | End: 2017-10-16
Payer: MEDICARE

## 2017-10-16 DIAGNOSIS — E87.5 HYPERKALEMIA: ICD-10-CM

## 2017-10-16 DIAGNOSIS — I48.20 CHRONIC ATRIAL FIBRILLATION (HCC): ICD-10-CM

## 2017-10-16 LAB
INR BLD: 1.9
POTASSIUM SERPL-SCNC: 4.8 MMOL/L (ref 3.7–5.3)
PROTHROMBIN TIME: 20.2 SEC (ref 9.4–11.3)

## 2017-10-16 PROCEDURE — 36415 COLL VENOUS BLD VENIPUNCTURE: CPT

## 2017-10-16 PROCEDURE — 85610 PROTHROMBIN TIME: CPT

## 2017-10-16 PROCEDURE — 84132 ASSAY OF SERUM POTASSIUM: CPT

## 2017-10-16 RX ORDER — WARFARIN SODIUM 3 MG/1
3 TABLET ORAL DAILY
Qty: 30 TABLET | Refills: 3 | Status: CANCELLED | OUTPATIENT
Start: 2017-10-16

## 2017-10-25 ENCOUNTER — OFFICE VISIT (OUTPATIENT)
Dept: CARDIOLOGY | Age: 82
End: 2017-10-25
Payer: MEDICARE

## 2017-10-25 ENCOUNTER — OFFICE VISIT (OUTPATIENT)
Dept: INTERNAL MEDICINE | Age: 82
End: 2017-10-25
Payer: MEDICARE

## 2017-10-25 VITALS
HEART RATE: 78 BPM | HEIGHT: 66 IN | BODY MASS INDEX: 25.07 KG/M2 | DIASTOLIC BLOOD PRESSURE: 50 MMHG | WEIGHT: 156 LBS | SYSTOLIC BLOOD PRESSURE: 104 MMHG

## 2017-10-25 VITALS
DIASTOLIC BLOOD PRESSURE: 56 MMHG | HEART RATE: 76 BPM | HEIGHT: 67 IN | SYSTOLIC BLOOD PRESSURE: 118 MMHG | WEIGHT: 156 LBS | BODY MASS INDEX: 24.48 KG/M2

## 2017-10-25 DIAGNOSIS — I25.5 ISCHEMIC CARDIOMYOPATHY: ICD-10-CM

## 2017-10-25 DIAGNOSIS — I10 ESSENTIAL HYPERTENSION: ICD-10-CM

## 2017-10-25 DIAGNOSIS — E78.5 DYSLIPIDEMIA: ICD-10-CM

## 2017-10-25 DIAGNOSIS — I34.0 MITRAL VALVE INSUFFICIENCY, UNSPECIFIED ETIOLOGY: Chronic | ICD-10-CM

## 2017-10-25 DIAGNOSIS — M85.80 OSTEOPENIA, UNSPECIFIED LOCATION: ICD-10-CM

## 2017-10-25 DIAGNOSIS — I49.5 SSS (SICK SINUS SYNDROME) (HCC): ICD-10-CM
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
## 2017-10-25 DIAGNOSIS — E11.51 DIABETES MELLITUS WITH PERIPHERAL VASCULAR DISEASE (HCC): ICD-10-CM

## 2017-10-25 DIAGNOSIS — E87.5 HYPERKALEMIA: Primary | ICD-10-CM

## 2017-10-25 DIAGNOSIS — E05.90 HYPERTHYROIDISM: ICD-10-CM

## 2017-10-25 DIAGNOSIS — I48.20 CHRONIC ATRIAL FIBRILLATION (HCC): ICD-10-CM

## 2017-10-25 DIAGNOSIS — I25.810 CORONARY ARTERY DISEASE INVOLVING CORONARY BYPASS GRAFT OF NATIVE HEART WITHOUT ANGINA PECTORIS: Primary | Chronic | ICD-10-CM

## 2017-10-25 DIAGNOSIS — Z45.02 ICD (IMPLANTABLE CARDIOVERTER-DEFIBRILLATOR) BATTERY DEPLETION: ICD-10-CM

## 2017-10-25 DIAGNOSIS — I25.810 CORONARY ARTERY DISEASE INVOLVING CORONARY BYPASS GRAFT OF NATIVE HEART WITHOUT ANGINA PECTORIS: Chronic | ICD-10-CM

## 2017-10-25 PROCEDURE — G8484 FLU IMMUNIZE NO ADMIN: HCPCS | Performed by: INTERNAL MEDICINE

## 2017-10-25 PROCEDURE — G8419 CALC BMI OUT NRM PARAM NOF/U: HCPCS | Performed by: INTERNAL MEDICINE

## 2017-10-25 PROCEDURE — G8598 ASA/ANTIPLAT THER USED: HCPCS | Performed by: INTERNAL MEDICINE

## 2017-10-25 PROCEDURE — 99214 OFFICE O/P EST MOD 30 MIN: CPT | Performed by: INTERNAL MEDICINE

## 2017-10-25 PROCEDURE — 1090F PRES/ABSN URINE INCON ASSESS: CPT | Performed by: INTERNAL MEDICINE

## 2017-10-25 PROCEDURE — 93000 ELECTROCARDIOGRAM COMPLETE: CPT | Performed by: INTERNAL MEDICINE

## 2017-10-25 PROCEDURE — G8427 DOCREV CUR MEDS BY ELIG CLIN: HCPCS | Performed by: INTERNAL MEDICINE

## 2017-10-25 PROCEDURE — 1123F ACP DISCUSS/DSCN MKR DOCD: CPT | Performed by: INTERNAL MEDICINE

## 2017-10-25 PROCEDURE — 1036F TOBACCO NON-USER: CPT | Performed by: INTERNAL MEDICINE

## 2017-10-25 PROCEDURE — 4040F PNEUMOC VAC/ADMIN/RCVD: CPT | Performed by: INTERNAL MEDICINE

## 2017-10-25 RX ORDER — NICOTINE POLACRILEX 4 MG/1
20 GUM, CHEWING ORAL DAILY
COMMUNITY
End: 2019-01-01 | Stop reason: SDUPTHER

## 2017-10-25 RX ORDER — WARFARIN SODIUM 3 MG/1
3-4.5 TABLET ORAL DAILY
Qty: 135 TABLET | Refills: 3 | Status: SHIPPED | OUTPATIENT
Start: 2017-10-25 | End: 2018-01-01 | Stop reason: SDUPTHER

## 2017-10-25 NOTE — PATIENT INSTRUCTIONS
skip meals. Your blood sugar may drop too low if you skip meals and take insulin or certain medicines for diabetes. · Check with your doctor before you drink alcohol. Alcohol can cause your blood sugar to drop too low. Alcohol can also cause a bad reaction if you take certain diabetes medicines. Follow-up care is a key part of your treatment and safety. Be sure to make and go to all appointments, and call your doctor if you are having problems. It's also a good idea to know your test results and keep a list of the medicines you take. Where can you learn more? Go to [email or web address] and sign in to your [employer] account. Enter C072 in the I-frontdesk box to learn more about \"Learning About Diabetes Food Guidelines. \"     If you do not have an account, please click on the \"Sign Up Now\" link. Current as of: March 13, 2017  Content Version: 11.3  © 3911-1384 [employer]. Care instructions adapted under license by [facility] ([facility]). If you have questions about a medical condition or this instruction, always ask your healthcare professional. [place] [address] any warranty or liability for your use of this information.

## 2017-10-25 NOTE — PROGRESS NOTES
Chronic Disease Visit Information    BP Readings from Last 3 Encounters:   10/25/17 (!) 118/56   10/25/17 (!) 104/50   09/06/17 120/60          Hemoglobin A1C (%)   Date Value   10/09/2017 6.2 (H)   05/03/2017 6.3 (H)   01/25/2017 6.0 (H)     Microalb/Crt. Ratio (mcg/mg creat)   Date Value   12/17/2015 CANNOT BE CALCULATED     LDL Cholesterol (mg/dL)   Date Value   01/25/2017 27     HDL (mg/dL)   Date Value   01/25/2017 29 (L)     BUN (mg/dL)   Date Value   10/09/2017 17     CREATININE (mg/dL)   Date Value   10/09/2017 0.95 (H)     Glucose (mg/dL)   Date Value   10/09/2017 167 (H)            Have you changed or started any medications since your last visit including any over-the-counter medicines, vitamins, or herbal medicines? no   Are you having any side effects from any of your medications? -  no  Have you stopped taking any of your medications? Is so, why? -  no    Have you seen any other physician or provider since your last visit? Yes - Records Obtained  Have you had any other diagnostic tests since your last visit? Yes - Records Obtained  Have you been seen in the emergency room and/or had an admission to a hospital since we last saw you? Yes - Records Obtained  Have you had your annual diabetic retinal (eye) exam? No  Have you had your routine dental cleaning in the past 6 months? yes -     Have you activated your [employer] account? If not, what are your barriers?  No: declines     Patient Care Team:  Marva Patel MD as PCP - Amber Santiago MD as PCP - S Attributed Provider         Medical History Review  Past Medical, Family, and Social History reviewed and does contribute to the patient presenting condition    Health Maintenance   Topic Date Due    Pneumococcal low/med risk (2 of 2 - PCV13) 01/18/2018    DTaP/Tdap/Td vaccine (2 - Td) 04/29/2026    Zostavax vaccine  Addressed    Flu vaccine  Completed

## 2017-10-25 NOTE — PATIENT INSTRUCTIONS
Your Procedure Will Be Scheduled at:      [facility]    [address] [place], [address] [facility] [facility]   (located across from [facility])    Located on the main floor of the [facility]. Report to our reception desk by the [employer]. Parking is free. Handicap parking is available by the main entrance. You may also park in [place] on Level 2 and enter building on the bridge to [facility]. Take elevator to the main floor. · Our  will call you to schedule your procedure within a week. If you do not receive a phone call, please call the  directly at [phone] and leave a message, or call [place] office at [phone]. Date:______________________________    Arrival Time:________________________    Procedure Time:_____________________    Instructions:_____________________________      · Bring Photo I.D. and insurance cards. · Bring all Medications in the bottles. · Pack an overnight bag in case you are required to spend the night. · You will need someone to drive you home. · The  will instruct you on holding food and drink the night before or morning of your procedure. · You are to take your Medications, along with your Cardiac and/or Blood Pressure Medications, with small sips of water on the morning of your Procedure, unless instructed otherwise by your Physician. · If you need additional directions please call [phone]. · If you have any questions please feel free to call the [employer] office at [phone].

## 2017-10-25 NOTE — PROGRESS NOTES
Date Taking? Authorizing Provider   omeprazole 20 MG EC tablet Take 20 mg by mouth daily    Yes Historical Provider, MD   methimazole (TAPAZOLE) 10 MG tablet TAKE 1/2 TABLET TWICE A DAY 10/2/17  Yes Fabrice Freire MD   warfarin (COUMADIN) 3 MG tablet Take 1 tablet by mouth daily 9/25/17  Yes Fabrice Freire MD   ramipril (ALTACE) 10 MG capsule TAKE 1 CAPSULE DAILY 9/12/17  Yes Fabrice Freire MD   furosemide (LASIX) 40 MG tablet TAKE 2 TABLETS DAILY 9/6/17  Yes Fabrice Freire MD   metoprolol tartrate (LOPRESSOR) 50 MG tablet TAKE 1 TABLET TWICE A DAY 9/5/17  Yes Fabrice Freire MD   digoxin (LANOXIN) 125 MCG tablet Take 1 tablet by mouth daily 7/19/17  Yes Fabrice Freire MD   aspirin 81 MG tablet Take 81 mg by mouth daily   Yes Historical Provider, MD   simvastatin (ZOCOR) 80 MG tablet TAKE 1 TABLET NIGHTLY 4/3/17  Yes Fabrice Freire MD   Handicap Placard MISC by Does not apply route EXP: 5 years 2/24/17  Yes Fabrice Freire MD   Multiple Vitamins-Minerals (MULTIVITAMIN PO) Take 1 tablet by mouth daily   Yes Historical Provider, MD   NITROSTAT 0.4 MG SL tablet Place 0.4 mg under the tongue as needed. 7/19/13  Yes Historical Provider, MD   acetaminophen (TYLENOL) 500 MG tablet Take 500 mg by mouth every 6 hours as needed. Yes Historical Provider, MD   calcium-vitamin D (OSCAL-500) 500-200 MG-UNIT per tablet Take 1 tablet by mouth daily    Yes Historical Provider, MD       Allergies:  Morphine    Social History:   reports that she has never smoked. She has never used smokeless tobacco. She reports that she does not drink alcohol. Review of Systems:  · Constitutional: there has been no unanticipated weight loss. · Eyes: No visual changes or diplopia. No scleral icterus. · ENT: No Headaches, hearing loss or vertigo. No mouth sores or sore throat. · Cardiovascular: As above. · Respiratory: Yes SOB, cough or hemoptysis. · Gastrointestinal: No abdominal pain, appetite loss, blood in stools.  No change in bowel or bladder habits. · Genitourinary: No dysuria, trouble voiding, or hematuria. · Musculoskeletal:  No gait disturbance, No weakness. Yes for joint complaints. · Integumentary: No rash or pruritis. · Neurological: No headache, diplopia, change in muscle strength, numbness or tingling. No change in gait, balance, coordination, mood, affect, memory, mentation, behavior. · Psychiatric: No anxiety, or depression. · Endocrine: No temperature intolerance. No excessive thirst, fluid intake, or urination. No tremor. · Hematologic/Lymphatic: No abnormal bruising or bleeding, blood clots or swollen lymph nodes. · Allergic/Immunologic: No nasal congestion or hives. Physical Exam:  BP (!) 104/50   Pulse 78   Ht 5' 6\" (1.676 m)   Wt 156 lb (70.8 kg)   BMI 25.18 kg/m²   Constitutional and General Appearance: alert, cooperative, no distress and appears stated age  [de-identified]: PERRL, no cervical lymphadenopathy. No masses palpable. Normal oral mucosa  Respiratory:  · Normal excursion and expansion without use of accessory muscles  · Resp Auscultation: Good respiratory effort. No for increased work of breathing. On auscultation: clear to auscultation bilaterally  Cardiovascular:  · The apical impulse is not displaced  · Heart tones are crisp and normal. regular S1 and S2. III/VI holosystolic murmur at the apex. · Jugular venous pulsation Normal  · The carotid upstroke is normal in amplitude and contour without delay or bruit  · Peripheral pulses are symmetrical and full   Abdomen:   · No masses or tenderness  · Bowel sounds present  Extremities:  ·  No Cyanosis or Clubbing  ·  Lower extremity edema: Yes  ·  Skin: Warm and dry  Neurological:  · Alert and oriented. · Moves all extremities well  · No abnormalities of mood, affect, memory, mentation, or behavior are noted    Cardiac Data:  Diagnostics:       EKG: vent. Paced rhythm with underlying atrial fibrillation.     Labs:   FASTING LIPID PANEL:  Lab Results Component Value Date    HDL 29 01/25/2017    TRIG 218 01/25/2017       IMPRESSION:    Patient Active Problem List   Diagnosis    Encounter for servicing of automatic implantable cardioverter-defibrillator (AICD) at end of battery life    Mitral regurgitation    Osteopenia    Osteoarthritis    Chronic venous insufficiency    Gout    Astigmatism of both eyes with presbyopia    Pseudophakia of both eyes    Hyperthyroidism    Chronic atrial fibrillation ([address]    Essential hypertension    Coronary artery disease involving coronary bypass graft of native heart without angina pectoris    Dyslipidemia    Chronic kidney disease    Cardiomyopathy ([address]    Carotid arterial disease ([address]    Diabetes mellitus with peripheral vascular disease ([address]    Pulmonary HTN     Echo 02/2015:  1. Left ventricular dimension is normal.  2. Left ventricular systolic function is decreased. Ejection fraction is 35%. 3. Global hypokinesis is noted. 4. Biatrial enlargement. 5. Right ventricle is normal in dimension with decreased function. 6. Mitral annular calcification. Moderate mitral regurgitation. 7. Sclerotic aortic valve. 8. Moderate tricuspid regurgitation. RVSP is 51mm Hg. 9. ICD wire noted in right heart chambers. 10. No pericardial effusion. Echo 08/08/2016:  1. Normal left ventricular dimension and wall thickness. 2.   Moderately decreased systolic function with global hypokinesis. Ejection fraction is 40%. 3.   Biatrial enlargement. 4.   Mildly dilated right ventricle with mildly reduced function. 5.   Mitral annular calcification. Moderate mitral regurgitation. 6.   Sclerotic aortic valve with trivial aortic regurgitation. 7.   Moderate to severe tricuspid regurgitation. RVSP is 39 mm Hg. 8.   No pericardial effusion. 9.   ICD/pacemaker wire noted in the right heart chambers. Assessment / Acute Cardiac Problems:     1-CAD: S/P CABG*4: Last cardiac cath 2009 patent LIMA-LAD and SVG-RPDA. Occluded grafts to OM1 and OM2 and medical treatment was advised:Stable with no signs or symptoms of ischemia  2-HTN: well controlled. 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 5-S/P ICD LV lead is dislodged and currently off. Working as regular ICD not BI V ICD: interrogation last month battery at end of life needs replacement. 6-Chronic atrial fibrillation: on anticoagulation. 7-Moderate MR.  8-Moderate TR with moderate PHTN  9-Thyroid disease. Plan of Treatment:     1-Continue current medical treatment with ASA, BB, ACEI, digoxin, diuretics, Coumadin and statin. 2-She felt better when her LV lead was working with CRT: will proceed with battery  change out and LV lead revision. 3-Aggressive risk factors modifications. 4-Diet, exercise and loose weight. 5-Regular ICD follow up every 6 months. 6-F/U in 6 months.     Electronically signed by Perry Mendez MD on 10/25/2017 at 9:17 AM  [facility]  [phone]

## 2017-10-25 NOTE — PROGRESS NOTES
Aleida received counseling on the following healthy behaviors: fall prevention  Reviewed prior labs and health maintenance  Continue current medications except where noted below, diet and exercise. Discussed use, benefit, and side effects of prescribed medications. Barriers to medication compliance addressed. Patient given educational materials - see patient instructions  Was a self-tracking handout given in paper form or via [employer]? Yes    Requested Prescriptions      No prescriptions requested or ordered in this encounter       All patient questions answered. Patient voiced understanding. Quality Measures    Body mass index is 24.8 kg/m². Normal. Weight control planned discussed: healthy diet and regular exercise. BP: (!) 118/56. Blood pressure is normal. Treatment plan consists of: see progress note below. Fall Risk 4/20/2017 7/26/2016 5/6/2015 4/18/2014   2 or more falls in past year? no yes no no   Fall with injury in past year? no yes no no     The patient has a history of falls. I did , complete a risk assessment for falls.  A plan of care for falls see additional progress note below    Lab Results   Component Value Date    LDLCHOLESTEROL 27 01/25/2017    (goal LDL reduction with dx if diabetes is 50% LDL reduction)    PHQ Scores 4/20/2017 1/26/2016 1/9/2015 1/17/2014   PHQ2 Score 0 1 0 0   PHQ9 Score 0 1 0 0     Interpretation of Total Score Depression Severity: 1-4 = Minimal depression, 5-9 = Mild depression, 10-14 = Moderate depression, 15-19 = Moderately severe depression, 20-27 = Severe depression

## 2017-10-30 NOTE — PROGRESS NOTES
medications. No significant myalgias. · Additionally, she met with the cardiologist and they are planning on a battery change and a lead revision for the pacemaker. Review of Systems:  Constitutional:  Negative for chills, fever, and weight loss. HENT:  Negative for congestion, ear pain, and sore throat. Eyes:  Negative for blurred vision, double vision, pain and discharge. Respiratory:  Negative for cough, shortness of breath, and wheezing. Cardiovascular:  Negative for chest pain, palpitations, and PND. Gastrointestinal:  Negative for abdominal pain, blood in stool, constipation, diarrhea, nausea and vomiting. Genitourinary:  Negative for dysuria, frequency, and hematuria. Musculoskeletal:  Negative for myalgias. Skin:  Negative for rash. Neurological:  Negative for tingling, sensory change, speech change, focal weakness, seizures, and headaches. Endo/Heme/Allergies:  Does not bruise/bleed easily. Psychiatric/Behavioral:  Negative for hallucinations and suicidal ideas.       Patient Active Problem List   Diagnosis    Encounter for servicing of automatic implantable cardioverter-defibrillator (AICD) at end of battery life    Mitral regurgitation    Osteopenia    Osteoarthritis    Chronic venous insufficiency    Gout    Astigmatism of both eyes with presbyopia    Pseudophakia of both eyes    Hyperthyroidism    Chronic atrial fibrillation ([address]    Essential hypertension    Coronary artery disease involving coronary bypass graft of native heart without angina pectoris    Dyslipidemia    Chronic kidney disease    Cardiomyopathy ([address]    Diabetes mellitus with peripheral vascular disease ([address]    Pulmonary HTN       Medications:    Current Outpatient Prescriptions   Medication Sig Dispense Refill    omeprazole 20 MG EC tablet Take 20 mg by mouth daily       methimazole (TAPAZOLE) 10 MG tablet TAKE 1/2 TABLET TWICE A DAY 90 tablet 3    ramipril (ALTACE) 10 MG capsule TAKE 1 CAPSULE for medical tx Dr Dick Alcaraz, 5.)Non -Q wave myocardial infarction 17/02, complicating knee replacement surgery, 6.)Non q-myocardial infarction, 06/04 [facility]. 7.) Repeat [place], 04/08,large lateral MI, anteroapical MI, EF  43% . Echo 2/15 35% EF mod MR    Cardiomyopathy ([address]     EF 35% echo 2015,  Echo 8/16  mod-sev TR, Mod MR, EF 40%, mild decrease RVfxn    Carotid arterial disease (HCC)     no hemodynamically significant narrowing-declines follow up    Chronic venous insufficiency     CRI (chronic renal insufficiency)     Diabetes mellitus with peripheral vascular disease (HCC)     diet controlled' checks BS @ home every other day    Diverticulosis of intestine     History of    GERD (gastroesophageal reflux disease)     Gout     Hepatomegaly     secondary to fatty liver infiltrate on CT scan, 06/05 ,1.) Repeat CT 03/06, stable. 2.)Liver decreased slightly in size on CT, 01/08.  3.)Increased LFTs.  4.)Hepatitis B and C and hemochromatosis testing unremarkable 11/09    Hernia     ventral    Hyperlipidemia     Hypertension     Hyperthyroidism     declines scan/ endo    Hyponatremia     question related to diuretic, question SIADH, stable.  Hypothyroidism     in past now hyperthyroid 2015    Leukopenia     mild with a WBC of 2.5  03/08    Mitral valve regurgitation     Echocardiogram 04/12, ejection fraction 40-45%, moderate MR and LAE. Echo 4/14 EF 40-45%, mod-sev MR Mod-sevTR  Repeat Echo 2/15 EF 35%, Mod MR, Mod TR RSVP 51    Osteoarthritis     Osteopenia     on DEXA, 05/02, T -0.7 spine, T -1.2 hip. 1.)Repeat DEXA 11/04 T-1.8 spine, T-2.0 hip. 2.)DEXA scan, 12/05 T-1.9 spine, T-1.8 hip,  3. )DEXA , T-2.2 spine, T -1.5 hip 01/08    Posterior vitreous detachment     bilateral     Presbyopia     Pulmonary HTN     Echo 8/16 with RVSP 38, mild decrease RV fxn    Upper gastrointestinal bleed     with multiple gastric and duodenal ulcers, 05/04, EGD Dr Connie Trejo    family history includes Cancer in her father; Heart Disease in her brother. History   Smoking Status    Never Smoker   Smokeless Tobacco    Never Used     History   Alcohol Use No       Physical Examination:  Constitutional:  She appears well-developed and well-nourished. No distress. HENT:  Head: Normocephalic and atraumatic. Right Ear:  External ear normal.  Left Ear:  External ear normal.  Nose:  Nose normal.  Mouth/Throat:  Oropharynx is clear and moist.  Eyes: Conjunctivae and EOM are normal.  Pupils are equal, round, and reactive to light. Right eye exhibits no discharge. Left eye exhibits no discharge. No scleral icterus. Neck:  Normal range of motion. Neck supple. No JVD present. No tracheal deviation present. No thyromegaly present. Cardiovascular:  Normal rate, normal heart sounds, and intact distal pulses. Exam reveals no gallop. Pulmonary/Chest:  Effort normal and breath sounds normal.  No respiratory distress. She has no wheezes. She has no rales. Abdominal:  Soft. Normal aorta and bowel sounds are normal.  She exhibits no distension and no mass. There is no hepatosplenomegaly. There is no tenderness. There is no rebound and no guarding. Musculoskeletal:  She exhibits no tenderness. 2+ edema. Lymphadenopathy:    She has no cervical adenopathy. Neurological:  She is alert. She has normal strength. She displays normal reflexes. No cranial nerve deficit or sensory deficit. She exhibits normal muscle tone. Skin:  Skin is warm and dry. No rash noted. She is not diaphoretic. No pallor. Psychiatric:  She has a normal mood and affect. Her behavior is normal.  Judgment normal.  Vitals reviewed. Vitals:    10/25/17 1024   BP: (!) 118/56   Site: Right Arm   Position: Sitting   Cuff Size: Large Adult   Pulse: 76   Weight: 156 lb (70.8 kg)   Height: 5' 6.5\" (1.689 m)     Body mass index is 24.8 kg/m².      Wt Readings from Last 3 Encounters:   10/25/17 156 lb (70.8 kg)   10/25/17 156 DEXA scan. 10. Mitral valve insufficiency, unspecified etiology  Moderate on echo August 2016. She has been hesitant to consider repeat echo. She is going to go for modification of the pacemaker as noted above. 6. Fall, initial encounter  With the fall this is concerning. I encouraged her to use her walker. She declines any other intervention at this point. She will call if any problems prior to return. No orders of the defined types were placed in this encounter.        Jana Germain am scribing for Anjelica Thao MD 10/30/2017 at 11:33 AM.

## 2017-10-31 ENCOUNTER — ANTI-COAG VISIT (OUTPATIENT)
Dept: INTERNAL MEDICINE | Age: 82
End: 2017-10-31

## 2017-10-31 ENCOUNTER — HOSPITAL ENCOUNTER (OUTPATIENT)
Dept: LAB | Age: 82
Setting detail: SPECIMEN
Discharge: HOME | End: 2017-10-31
Payer: MEDICARE

## 2017-10-31 DIAGNOSIS — I48.20 CHRONIC ATRIAL FIBRILLATION (HCC): ICD-10-CM

## 2017-10-31 LAB
INR BLD: 2.3
PROTHROMBIN TIME: 24.5 SEC (ref 9.4–11.3)

## 2017-10-31 PROCEDURE — 85610 PROTHROMBIN TIME: CPT

## 2017-10-31 PROCEDURE — 36415 COLL VENOUS BLD VENIPUNCTURE: CPT

## 2017-11-06 ENCOUNTER — HOSPITAL ENCOUNTER (OUTPATIENT)
Dept: CARDIAC CATH/INVASIVE PROCEDURES | Age: 82
Discharge: HOME | End: 2017-11-06
Payer: MEDICARE

## 2017-11-06 VITALS
TEMPERATURE: 97.8 F | RESPIRATION RATE: 20 BRPM | HEIGHT: 65 IN | BODY MASS INDEX: 26.16 KG/M2 | HEART RATE: 62 BPM | SYSTOLIC BLOOD PRESSURE: 136 MMHG | WEIGHT: 157 LBS | OXYGEN SATURATION: 95 % | DIASTOLIC BLOOD PRESSURE: 59 MMHG

## 2017-11-06 LAB
GFR NON-AFRICAN AMERICAN: 59 ML/MIN
GFR SERPL CREATININE-BSD FRML MDRD: >60 ML/MIN
GFR SERPL CREATININE-BSD FRML MDRD: ABNORMAL ML/MIN/{1.73_M2}
GLUCOSE BLD-MCNC: 89 MG/DL (ref 74–100)
POC CHLORIDE: 101 MMOL/L (ref 98–107)
POC CREATININE: 0.91 MG/DL (ref 0.51–1.19)
POC HEMATOCRIT: 33 % (ref 36–46)
POC HEMOGLOBIN: 11.3 G/DL (ref 12–16)
POC INR: 1.1
POC POTASSIUM: 3.6 MMOL/L (ref 3.5–4.5)
POC SODIUM: 140 MMOL/L (ref 138–146)
PROTHROMBIN TIME, POC: 12.8 SEC (ref 10.4–14.2)

## 2017-11-06 PROCEDURE — 2720000010 HC SURG SUPPLY STERILE

## 2017-11-06 PROCEDURE — 6360000002 HC RX W HCPCS

## 2017-11-06 PROCEDURE — 82947 ASSAY GLUCOSE BLOOD QUANT: CPT

## 2017-11-06 PROCEDURE — 82565 ASSAY OF CREATININE: CPT

## 2017-11-06 PROCEDURE — 7100000011 HC PHASE II RECOVERY - ADDTL 15 MIN

## 2017-11-06 PROCEDURE — 2500000003 HC RX 250 WO HCPCS: Performed by: INTERNAL MEDICINE

## 2017-11-06 PROCEDURE — 2500000003 HC RX 250 WO HCPCS

## 2017-11-06 PROCEDURE — 84132 ASSAY OF SERUM POTASSIUM: CPT

## 2017-11-06 PROCEDURE — 85610 PROTHROMBIN TIME: CPT

## 2017-11-06 PROCEDURE — 85014 HEMATOCRIT: CPT

## 2017-11-06 PROCEDURE — 2580000003 HC RX 258: Performed by: INTERNAL MEDICINE

## 2017-11-06 PROCEDURE — 82435 ASSAY OF BLOOD CHLORIDE: CPT

## 2017-11-06 PROCEDURE — 2580000003 HC RX 258

## 2017-11-06 PROCEDURE — 7100000010 HC PHASE II RECOVERY - FIRST 15 MIN

## 2017-11-06 PROCEDURE — 33263 RMVL & RPLCMT DFB GEN 2 LEAD: CPT | Performed by: INTERNAL MEDICINE

## 2017-11-06 PROCEDURE — C1721 AICD, DUAL CHAMBER: HCPCS

## 2017-11-06 PROCEDURE — 84295 ASSAY OF SERUM SODIUM: CPT

## 2017-11-06 RX ORDER — SODIUM CHLORIDE 9 MG/ML
INJECTION, SOLUTION INTRAVENOUS CONTINUOUS
Status: DISCONTINUED | OUTPATIENT
Start: 2017-11-06 | End: 2017-11-07 | Stop reason: HOSPADM

## 2017-11-06 RX ORDER — ONDANSETRON 2 MG/ML
4 INJECTION INTRAMUSCULAR; INTRAVENOUS EVERY 6 HOURS PRN
Status: DISCONTINUED | OUTPATIENT
Start: 2017-11-06 | End: 2017-11-07 | Stop reason: HOSPADM

## 2017-11-06 RX ORDER — SODIUM CHLORIDE 0.9 % (FLUSH) 0.9 %
10 SYRINGE (ML) INJECTION PRN
Status: DISCONTINUED | OUTPATIENT
Start: 2017-11-06 | End: 2017-11-07 | Stop reason: HOSPADM

## 2017-11-06 RX ORDER — SODIUM CHLORIDE 0.9 % (FLUSH) 0.9 %
10 SYRINGE (ML) INJECTION EVERY 12 HOURS SCHEDULED
Status: DISCONTINUED | OUTPATIENT
Start: 2017-11-06 | End: 2017-11-07 | Stop reason: HOSPADM

## 2017-11-06 RX ORDER — ACETAMINOPHEN 325 MG/1
650 TABLET ORAL EVERY 4 HOURS PRN
Status: DISCONTINUED | OUTPATIENT
Start: 2017-11-06 | End: 2017-11-07 | Stop reason: HOSPADM

## 2017-11-06 RX ADMIN — SODIUM CHLORIDE: 9 INJECTION, SOLUTION INTRAVENOUS at 14:45

## 2017-11-06 NOTE — H&P
Indication for change out: Device changeout due to IVY  Patient seen and examined. Please refer to the outpatient note by Dr Agarwal Comes from 10/25/2017. No major changes since last seen. Risks and benefits of the procedure discussed. Informed consent was obtained. Will proceed with the proposed procedure. Will discuss with Dr Gilbert Torrez.     [address]  Cardiology Fellow

## 2017-11-06 NOTE — PROCEDURES
[place] [facility]                Procedure Note  Dual Chamber ICD Generator change        Beth Pineda (75 y.o., female)  7/17/1931 11/6/2017      Procedure: Generator Change    Operators:  Primary:Dr Crispin Olsen  Assistant:Dr Marie Kemp    :        Name    Medtronic [id number]          Model # Serial #   ICD DDB1D1 [id number]   RA-Lead [id number]   RV-Lead [id number]   LV-Lead [id number](IFSH CAPPED)       ATRIUM R VENT L VENT   P waves 0.7 mV R waves:  mV LEAD CAPPED   Threshold:A FIB Threshold:0.75V/0.4ms    Impedance:323 Impedance:343        · Sedation monitoring  · LV lead CAPPED   · Pocket   · Generators Implant        Indication: IVY OF GENERATOR    Procedure  After the usual preparation of the left neck and chest, the patient was draped in the usual sterile manner. Local anesthesia was infused below the left clavicle from the midline laterally. An incision was made inferior and parallel to the clavicle on the old scar. The incision was carried down to the fascia. A pocket was formed inferior using blunt dissection. LV Lead Implant: The LV lead was capped. Generator: The implanted leads were attached to the AICD using the setscrews. The pocket was irrigated with antibiotic solution. The pulse generator and leads were coiled and placed in the pocket. The pocket was closed using multiple layers of suture and a dry sterile dressing was applied. There were no complications, patient tolerated the procedure well. The patient left the EP lab in stable condition.           Impression / Device:  Successful change out of ICD  Cap the old LV lead    Plan:  Discharge if patient remains stable        Electronically signed by Meryle Loupe, MD on 11/6/2017 at 5:00 PM  Cardiology Fellow    I have reviewed the case with resident / fellow  I have examined the patient personally  Agree with treatment plan, correction innotes was made as appropriate, and discussed final arrangement based on  my evaluation and exam  Risk and benefit of procedure explained     Procedure was performed by me, with all aspect of the procedure being done using standard protocol.     Oksana Hendrix MD  [facility]

## 2017-11-13 ENCOUNTER — NURSE ONLY (OUTPATIENT)
Dept: CARDIOLOGY | Age: 82
End: 2017-11-13

## 2017-11-17 ENCOUNTER — NURSE ONLY (OUTPATIENT)
Dept: CARDIOLOGY | Age: 82
End: 2017-11-17

## 2017-11-17 VITALS
WEIGHT: 150 LBS | DIASTOLIC BLOOD PRESSURE: 60 MMHG | BODY MASS INDEX: 24.96 KG/M2 | SYSTOLIC BLOOD PRESSURE: 118 MMHG | HEART RATE: 64 BPM

## 2017-11-17 DIAGNOSIS — I42.8 OTHER CARDIOMYOPATHY (HCC): Primary | ICD-10-CM

## 2017-11-17 NOTE — PROGRESS NOTES
ICD replacement incision on left upper chest clean and dry, well approximated. No signs of infection, no redness or warmth. Pt denies complaints, pain or cough. No signs of distress. Dr. Neil Ruiz visualized the area. I removed stapled and applied steri-strips. Pt is aware of follow up instructions. Pleasant and cooperative. She does have jose ankle edema, non-pitting. She states they are improved and she has been out, getting her hair done and eatting lunch prior to this.

## 2017-11-27 ENCOUNTER — ANTI-COAG VISIT (OUTPATIENT)
Dept: INTERNAL MEDICINE | Age: 82
End: 2017-11-27

## 2017-11-27 ENCOUNTER — TELEPHONE (OUTPATIENT)
Dept: INTERNAL MEDICINE | Age: 82
End: 2017-11-27

## 2017-11-27 ENCOUNTER — HOSPITAL ENCOUNTER (OUTPATIENT)
Dept: LAB | Age: 82
Setting detail: SPECIMEN
Discharge: HOME | End: 2017-11-27
Payer: MEDICARE

## 2017-11-27 DIAGNOSIS — E87.6 HYPOKALEMIA: Primary | ICD-10-CM

## 2017-11-27 DIAGNOSIS — I48.20 CHRONIC ATRIAL FIBRILLATION (HCC): ICD-10-CM

## 2017-11-27 DIAGNOSIS — E87.5 HYPERKALEMIA: ICD-10-CM

## 2017-11-27 LAB
INR BLD: 1.9
POTASSIUM SERPL-SCNC: 3.8 MMOL/L (ref 3.7–5.3)
PROTHROMBIN TIME: 19.9 SEC (ref 9.4–11.3)

## 2017-11-27 PROCEDURE — 36415 COLL VENOUS BLD VENIPUNCTURE: CPT

## 2017-11-27 PROCEDURE — 84132 ASSAY OF SERUM POTASSIUM: CPT

## 2017-11-27 PROCEDURE — 85610 PROTHROMBIN TIME: CPT

## 2017-11-27 RX ORDER — POTASSIUM CHLORIDE 20 MEQ/1
20 TABLET, EXTENDED RELEASE ORAL DAILY
COMMUNITY
End: 2018-01-01 | Stop reason: SDUPTHER

## 2017-11-30 ENCOUNTER — PROCEDURE VISIT (OUTPATIENT)
Dept: CARDIOLOGY | Age: 82
End: 2017-11-30
Payer: MEDICARE

## 2017-11-30 DIAGNOSIS — Z45.02 ICD (IMPLANTABLE CARDIOVERTER-DEFIBRILLATOR) BATTERY DEPLETION: Primary | ICD-10-CM

## 2017-11-30 DIAGNOSIS — I49.5 SSS (SICK SINUS SYNDROME) (HCC): ICD-10-CM

## 2017-11-30 PROCEDURE — 93289 INTERROG DEVICE EVAL HEART: CPT | Performed by: INTERNAL MEDICINE

## 2017-12-11 ENCOUNTER — ANTI-COAG VISIT (OUTPATIENT)
Dept: INTERNAL MEDICINE | Age: 82
End: 2017-12-11

## 2017-12-11 ENCOUNTER — HOSPITAL ENCOUNTER (OUTPATIENT)
Dept: LAB | Age: 82
Setting detail: SPECIMEN
Discharge: HOME | End: 2017-12-11
Payer: MEDICARE

## 2017-12-11 DIAGNOSIS — E87.6 HYPOKALEMIA: ICD-10-CM

## 2017-12-11 DIAGNOSIS — I48.20 CHRONIC ATRIAL FIBRILLATION (HCC): ICD-10-CM

## 2017-12-11 DIAGNOSIS — E87.6 HYPOKALEMIA: Primary | ICD-10-CM

## 2017-12-11 LAB
INR BLD: 1.7
POTASSIUM SERPL-SCNC: 4.4 MMOL/L (ref 3.7–5.3)
PROTHROMBIN TIME: 17.9 SEC (ref 9.4–11.3)

## 2017-12-11 PROCEDURE — 84132 ASSAY OF SERUM POTASSIUM: CPT

## 2017-12-11 PROCEDURE — 36415 COLL VENOUS BLD VENIPUNCTURE: CPT

## 2017-12-11 PROCEDURE — 85610 PROTHROMBIN TIME: CPT

## 2017-12-13 RX ORDER — FUROSEMIDE 40 MG/1
TABLET ORAL
Qty: 180 TABLET | Refills: 3 | Status: SHIPPED | OUTPATIENT
Start: 2017-12-13 | End: 2018-01-31 | Stop reason: SDUPTHER

## 2018-01-01 ENCOUNTER — TELEPHONE (OUTPATIENT)
Dept: INTERNAL MEDICINE | Age: 83
End: 2018-01-01

## 2018-01-01 ENCOUNTER — ANTI-COAG VISIT (OUTPATIENT)
Dept: INTERNAL MEDICINE | Age: 83
End: 2018-01-01
Payer: MEDICARE

## 2018-01-01 ENCOUNTER — HOSPITAL ENCOUNTER (OUTPATIENT)
Dept: LAB | Age: 83
Setting detail: SPECIMEN
Discharge: HOME | End: 2018-09-06
Payer: MEDICARE

## 2018-01-01 ENCOUNTER — HOSPITAL ENCOUNTER (OUTPATIENT)
Dept: LAB | Age: 83
Discharge: HOME | End: 2018-10-08
Payer: MEDICARE

## 2018-01-01 ENCOUNTER — HOSPITAL ENCOUNTER (OUTPATIENT)
Dept: NON INVASIVE DIAGNOSTICS | Age: 83
Discharge: HOME | End: 2018-08-06
Payer: MEDICARE

## 2018-01-01 ENCOUNTER — HOSPITAL ENCOUNTER (OUTPATIENT)
Age: 83
Discharge: HOME | End: 2018-12-26
Payer: MEDICARE

## 2018-01-01 ENCOUNTER — PROCEDURE VISIT (OUTPATIENT)
Dept: CARDIOLOGY | Age: 83
End: 2018-01-01
Payer: MEDICARE

## 2018-01-01 ENCOUNTER — HOSPITAL ENCOUNTER (OUTPATIENT)
Dept: LAB | Age: 83
Setting detail: SPECIMEN
Discharge: HOME | End: 2018-07-27
Payer: MEDICARE

## 2018-01-01 ENCOUNTER — OFFICE VISIT (OUTPATIENT)
Dept: CARDIOLOGY | Age: 83
End: 2018-01-01
Payer: MEDICARE

## 2018-01-01 ENCOUNTER — OFFICE VISIT (OUTPATIENT)
Dept: INTERNAL MEDICINE | Age: 83
End: 2018-01-01
Payer: MEDICARE

## 2018-01-01 ENCOUNTER — HOSPITAL ENCOUNTER (OUTPATIENT)
Age: 83
Discharge: HOME | End: 2018-12-12
Payer: MEDICARE

## 2018-01-01 ENCOUNTER — OFFICE VISIT (OUTPATIENT)
Dept: PODIATRY | Age: 83
End: 2018-01-01
Payer: MEDICARE

## 2018-01-01 ENCOUNTER — HOSPITAL ENCOUNTER (OUTPATIENT)
Dept: LAB | Age: 83
Setting detail: SPECIMEN
Discharge: HOME | End: 2018-06-13
Payer: MEDICARE

## 2018-01-01 ENCOUNTER — HOSPITAL ENCOUNTER (OUTPATIENT)
Dept: LAB | Age: 83
Discharge: HOME | End: 2018-12-21
Payer: MEDICARE

## 2018-01-01 ENCOUNTER — TELEPHONE (OUTPATIENT)
Dept: CARDIOLOGY | Age: 83
End: 2018-01-01

## 2018-01-01 ENCOUNTER — HOSPITAL ENCOUNTER (OUTPATIENT)
Dept: LAB | Age: 83
Setting detail: SPECIMEN
Discharge: HOME | End: 2018-09-13
Payer: MEDICARE

## 2018-01-01 ENCOUNTER — OFFICE VISIT (OUTPATIENT)
Dept: ORTHOPEDIC SURGERY | Age: 83
End: 2018-01-01
Payer: MEDICARE

## 2018-01-01 ENCOUNTER — HOSPITAL ENCOUNTER (OUTPATIENT)
Dept: GENERAL RADIOLOGY | Age: 83
Discharge: HOME | End: 2018-09-21
Payer: MEDICARE

## 2018-01-01 ENCOUNTER — HOSPITAL ENCOUNTER (OUTPATIENT)
Age: 83
Setting detail: SPECIMEN
Discharge: HOME | End: 2018-12-05
Payer: MEDICARE

## 2018-01-01 ENCOUNTER — HOSPITAL ENCOUNTER (OUTPATIENT)
Dept: LAB | Age: 83
Setting detail: SPECIMEN
Discharge: HOME | End: 2018-06-20
Payer: MEDICARE

## 2018-01-01 ENCOUNTER — HOSPITAL ENCOUNTER (OUTPATIENT)
Dept: LAB | Age: 83
Setting detail: SPECIMEN
Discharge: HOME | End: 2018-08-15
Payer: MEDICARE

## 2018-01-01 ENCOUNTER — OFFICE VISIT (OUTPATIENT)
Dept: PRIMARY CARE CLINIC | Age: 83
End: 2018-01-01
Payer: MEDICARE

## 2018-01-01 ENCOUNTER — HOSPITAL ENCOUNTER (OUTPATIENT)
Dept: LAB | Age: 83
Setting detail: SPECIMEN
Discharge: HOME | End: 2018-08-16
Payer: MEDICARE

## 2018-01-01 ENCOUNTER — HOSPITAL ENCOUNTER (OUTPATIENT)
Dept: LAB | Age: 83
Setting detail: SPECIMEN
Discharge: HOME | End: 2018-07-18
Payer: MEDICARE

## 2018-01-01 ENCOUNTER — HOSPITAL ENCOUNTER (OUTPATIENT)
Age: 83
Setting detail: SPECIMEN
Discharge: HOME | End: 2018-11-07
Payer: MEDICARE

## 2018-01-01 ENCOUNTER — HOSPITAL ENCOUNTER (OUTPATIENT)
Dept: LAB | Age: 83
Setting detail: SPECIMEN
Discharge: HOME | End: 2018-05-16
Payer: MEDICARE

## 2018-01-01 ENCOUNTER — HOSPITAL ENCOUNTER (OUTPATIENT)
Dept: LAB | Age: 83
Setting detail: SPECIMEN
Discharge: HOME | End: 2018-09-20
Payer: MEDICARE

## 2018-01-01 ENCOUNTER — HOSPITAL ENCOUNTER (OUTPATIENT)
Dept: LAB | Age: 83
Setting detail: SPECIMEN
Discharge: HOME | End: 2018-08-01
Payer: MEDICARE

## 2018-01-01 ENCOUNTER — HOSPITAL ENCOUNTER (OUTPATIENT)
Dept: LAB | Age: 83
Setting detail: SPECIMEN
Discharge: HOME | End: 2018-08-21
Payer: MEDICARE

## 2018-01-01 ENCOUNTER — HOSPITAL ENCOUNTER (OUTPATIENT)
Age: 83
Discharge: HOME | End: 2018-10-31
Payer: MEDICARE

## 2018-01-01 ENCOUNTER — HOSPITAL ENCOUNTER (OUTPATIENT)
Age: 83
Setting detail: SPECIMEN
Discharge: HOME | End: 2018-12-19
Payer: MEDICARE

## 2018-01-01 ENCOUNTER — HOSPITAL ENCOUNTER (OUTPATIENT)
Age: 83
Setting detail: SPECIMEN
Discharge: HOME | End: 2018-11-21
Payer: MEDICARE

## 2018-01-01 ENCOUNTER — HOSPITAL ENCOUNTER (OUTPATIENT)
Dept: LAB | Age: 83
Setting detail: SPECIMEN
Discharge: HOME | End: 2018-08-24
Payer: MEDICARE

## 2018-01-01 VITALS
WEIGHT: 151 LBS | DIASTOLIC BLOOD PRESSURE: 80 MMHG | SYSTOLIC BLOOD PRESSURE: 120 MMHG | TEMPERATURE: 98 F | OXYGEN SATURATION: 94 % | BODY MASS INDEX: 25.16 KG/M2 | HEART RATE: 72 BPM | HEIGHT: 65 IN

## 2018-01-01 VITALS
TEMPERATURE: 99.6 F | DIASTOLIC BLOOD PRESSURE: 74 MMHG | BODY MASS INDEX: 25.53 KG/M2 | HEART RATE: 74 BPM | SYSTOLIC BLOOD PRESSURE: 112 MMHG | WEIGHT: 153.4 LBS | OXYGEN SATURATION: 97 %

## 2018-01-01 VITALS
WEIGHT: 136 LBS | HEIGHT: 64 IN | HEART RATE: 66 BPM | DIASTOLIC BLOOD PRESSURE: 60 MMHG | SYSTOLIC BLOOD PRESSURE: 120 MMHG | BODY MASS INDEX: 23.22 KG/M2

## 2018-01-01 VITALS
DIASTOLIC BLOOD PRESSURE: 68 MMHG | BODY MASS INDEX: 24.41 KG/M2 | HEART RATE: 78 BPM | HEIGHT: 64 IN | SYSTOLIC BLOOD PRESSURE: 134 MMHG | WEIGHT: 143 LBS

## 2018-01-01 VITALS
DIASTOLIC BLOOD PRESSURE: 72 MMHG | HEART RATE: 62 BPM | TEMPERATURE: 98.9 F | WEIGHT: 151.4 LBS | OXYGEN SATURATION: 97 % | SYSTOLIC BLOOD PRESSURE: 120 MMHG | BODY MASS INDEX: 25.22 KG/M2 | RESPIRATION RATE: 14 BRPM | HEIGHT: 65 IN

## 2018-01-01 VITALS
HEART RATE: 72 BPM | WEIGHT: 133 LBS | BODY MASS INDEX: 24.48 KG/M2 | SYSTOLIC BLOOD PRESSURE: 120 MMHG | HEIGHT: 62 IN | DIASTOLIC BLOOD PRESSURE: 68 MMHG

## 2018-01-01 VITALS
WEIGHT: 162 LBS | BODY MASS INDEX: 27.66 KG/M2 | HEART RATE: 62 BPM | SYSTOLIC BLOOD PRESSURE: 120 MMHG | TEMPERATURE: 98.2 F | HEIGHT: 64 IN | OXYGEN SATURATION: 97 % | DIASTOLIC BLOOD PRESSURE: 60 MMHG | RESPIRATION RATE: 16 BRPM

## 2018-01-01 VITALS
DIASTOLIC BLOOD PRESSURE: 66 MMHG | HEIGHT: 63 IN | BODY MASS INDEX: 23.92 KG/M2 | SYSTOLIC BLOOD PRESSURE: 124 MMHG | WEIGHT: 135 LBS | HEART RATE: 85 BPM

## 2018-01-01 VITALS
WEIGHT: 140.2 LBS | RESPIRATION RATE: 16 BRPM | HEIGHT: 65 IN | DIASTOLIC BLOOD PRESSURE: 60 MMHG | SYSTOLIC BLOOD PRESSURE: 110 MMHG | BODY MASS INDEX: 23.36 KG/M2 | HEART RATE: 60 BPM

## 2018-01-01 VITALS
WEIGHT: 136 LBS | SYSTOLIC BLOOD PRESSURE: 118 MMHG | DIASTOLIC BLOOD PRESSURE: 58 MMHG | HEIGHT: 62 IN | HEART RATE: 68 BPM | RESPIRATION RATE: 16 BRPM | BODY MASS INDEX: 25.03 KG/M2

## 2018-01-01 VITALS
SYSTOLIC BLOOD PRESSURE: 132 MMHG | HEART RATE: 69 BPM | HEIGHT: 64 IN | DIASTOLIC BLOOD PRESSURE: 77 MMHG | WEIGHT: 136 LBS | BODY MASS INDEX: 23.22 KG/M2

## 2018-01-01 VITALS
HEIGHT: 63 IN | DIASTOLIC BLOOD PRESSURE: 66 MMHG | SYSTOLIC BLOOD PRESSURE: 126 MMHG | BODY MASS INDEX: 24.52 KG/M2 | HEART RATE: 80 BPM | WEIGHT: 138.4 LBS | RESPIRATION RATE: 20 BRPM

## 2018-01-01 DIAGNOSIS — I87.2 CHRONIC VENOUS INSUFFICIENCY: ICD-10-CM

## 2018-01-01 DIAGNOSIS — E78.5 DYSLIPIDEMIA: ICD-10-CM

## 2018-01-01 DIAGNOSIS — I48.20 CHRONIC ATRIAL FIBRILLATION (HCC): ICD-10-CM

## 2018-01-01 DIAGNOSIS — I34.0 NON-RHEUMATIC MITRAL REGURGITATION: ICD-10-CM

## 2018-01-01 DIAGNOSIS — Z51.81 MONITORING FOR LONG-TERM ANTICOAGULANT USE: ICD-10-CM

## 2018-01-01 DIAGNOSIS — E78.2 MIXED HYPERLIPIDEMIA: ICD-10-CM

## 2018-01-01 DIAGNOSIS — E11.51 CONTROLLED TYPE 2 DIABETES MELLITUS WITH DIABETIC PERIPHERAL ANGIOPATHY WITHOUT GANGRENE, WITHOUT LONG-TERM CURRENT USE OF INSULIN (HCC): Primary | ICD-10-CM

## 2018-01-01 DIAGNOSIS — Z79.01 MONITORING FOR LONG-TERM ANTICOAGULANT USE: ICD-10-CM

## 2018-01-01 DIAGNOSIS — Z45.02 ICD (IMPLANTABLE CARDIOVERTER-DEFIBRILLATOR) BATTERY DEPLETION: Primary | ICD-10-CM

## 2018-01-01 DIAGNOSIS — R60.0 BILATERAL LOWER EXTREMITY EDEMA: Primary | ICD-10-CM

## 2018-01-01 DIAGNOSIS — B35.1 DERMATOPHYTOSIS OF NAIL: ICD-10-CM

## 2018-01-01 DIAGNOSIS — I42.9 CARDIOMYOPATHY, UNSPECIFIED TYPE (HCC): ICD-10-CM

## 2018-01-01 DIAGNOSIS — I25.5 ISCHEMIC CARDIOMYOPATHY: ICD-10-CM

## 2018-01-01 DIAGNOSIS — Z13.6 SCREENING FOR CARDIOVASCULAR CONDITION: ICD-10-CM

## 2018-01-01 DIAGNOSIS — E05.90 HYPERTHYROIDISM: ICD-10-CM

## 2018-01-01 DIAGNOSIS — J06.9 UPPER RESPIRATORY TRACT INFECTION, UNSPECIFIED TYPE: Primary | ICD-10-CM

## 2018-01-01 DIAGNOSIS — I49.5 SSS (SICK SINUS SYNDROME) (HCC): ICD-10-CM

## 2018-01-01 DIAGNOSIS — I48.20 CHRONIC ATRIAL FIBRILLATION (HCC): Primary | ICD-10-CM

## 2018-01-01 DIAGNOSIS — E11.51 CONTROLLED TYPE 2 DIABETES MELLITUS WITH DIABETIC PERIPHERAL ANGIOPATHY WITHOUT GANGRENE, WITHOUT LONG-TERM CURRENT USE OF INSULIN (HCC): ICD-10-CM

## 2018-01-01 DIAGNOSIS — M15.9 PRIMARY OSTEOARTHRITIS INVOLVING MULTIPLE JOINTS: ICD-10-CM

## 2018-01-01 DIAGNOSIS — E05.90 HYPERTHYROIDISM: Primary | ICD-10-CM

## 2018-01-01 DIAGNOSIS — R60.9 SWELLING: Primary | ICD-10-CM

## 2018-01-01 DIAGNOSIS — Z45.02 ICD (IMPLANTABLE CARDIOVERTER-DEFIBRILLATOR) BATTERY DEPLETION: ICD-10-CM

## 2018-01-01 DIAGNOSIS — R41.0 CONFUSION: ICD-10-CM

## 2018-01-01 DIAGNOSIS — Z00.00 ROUTINE GENERAL MEDICAL EXAMINATION AT A HEALTH CARE FACILITY: Primary | ICD-10-CM

## 2018-01-01 DIAGNOSIS — Z51.81 ENCOUNTER FOR THERAPEUTIC DRUG MONITORING: ICD-10-CM

## 2018-01-01 DIAGNOSIS — Z23 INFLUENZA VACCINE NEEDED: ICD-10-CM

## 2018-01-01 DIAGNOSIS — I25.810 CORONARY ARTERY DISEASE INVOLVING CORONARY BYPASS GRAFT OF NATIVE HEART WITHOUT ANGINA PECTORIS: Chronic | ICD-10-CM

## 2018-01-01 DIAGNOSIS — J40 BRONCHITIS: Primary | ICD-10-CM

## 2018-01-01 DIAGNOSIS — R60.9 SWELLING: ICD-10-CM

## 2018-01-01 DIAGNOSIS — I10 ESSENTIAL HYPERTENSION: ICD-10-CM

## 2018-01-01 DIAGNOSIS — M53.3 PAIN IN THE COCCYX: ICD-10-CM

## 2018-01-01 DIAGNOSIS — R60.0 BILATERAL LOWER EXTREMITY EDEMA: ICD-10-CM

## 2018-01-01 DIAGNOSIS — I27.20 PULMONARY HTN (HCC): ICD-10-CM

## 2018-01-01 DIAGNOSIS — Z45.02 ENCOUNTER FOR INTERROGATION OF CARDIAC DEFIBRILLATOR: ICD-10-CM

## 2018-01-01 DIAGNOSIS — M85.80 OSTEOPENIA, UNSPECIFIED LOCATION: ICD-10-CM

## 2018-01-01 DIAGNOSIS — G31.84 MILD COGNITIVE IMPAIRMENT: ICD-10-CM

## 2018-01-01 DIAGNOSIS — S32.10XA CLOSED FRACTURE OF SACRUM, UNSPECIFIED PORTION OF SACRUM, INITIAL ENCOUNTER (HCC): Primary | ICD-10-CM

## 2018-01-01 DIAGNOSIS — N18.30 STAGE 3 CHRONIC KIDNEY DISEASE (HCC): ICD-10-CM

## 2018-01-01 DIAGNOSIS — I25.810 CORONARY ARTERY DISEASE INVOLVING CORONARY BYPASS GRAFT OF NATIVE HEART WITHOUT ANGINA PECTORIS: Primary | Chronic | ICD-10-CM

## 2018-01-01 DIAGNOSIS — M53.3 COCCYDYNIA: Primary | ICD-10-CM

## 2018-01-01 DIAGNOSIS — I42.8 OTHER CARDIOMYOPATHY (HCC): ICD-10-CM

## 2018-01-01 DIAGNOSIS — Z91.81 AT HIGH RISK FOR FALLS: ICD-10-CM

## 2018-01-01 LAB
-: ABNORMAL
-: NORMAL
ABSOLUTE EOS #: 0 K/UL (ref 0–0.4)
ABSOLUTE EOS #: 0.1 K/UL (ref 0–0.4)
ABSOLUTE IMMATURE GRANULOCYTE: ABNORMAL K/UL (ref 0–0.3)
ABSOLUTE IMMATURE GRANULOCYTE: ABNORMAL K/UL (ref 0–0.3)
ABSOLUTE LYMPH #: 0.8 K/UL (ref 1–4.8)
ABSOLUTE LYMPH #: 0.9 K/UL (ref 1–4.8)
ABSOLUTE MONO #: 0.2 K/UL (ref 0.1–1.2)
ABSOLUTE MONO #: 0.3 K/UL (ref 0.1–1.2)
ALBUMIN SERPL-MCNC: 3.8 G/DL (ref 3.5–5.2)
ALBUMIN SERPL-MCNC: 4 G/DL (ref 3.5–5.2)
ALBUMIN SERPL-MCNC: 4.2 G/DL (ref 3.5–5.2)
ALBUMIN/GLOBULIN RATIO: 1.2 (ref 1–2.5)
ALBUMIN/GLOBULIN RATIO: 1.3 (ref 1–2.5)
ALBUMIN/GLOBULIN RATIO: 1.3 (ref 1–2.5)
ALP BLD-CCNC: 75 U/L (ref 35–104)
ALP BLD-CCNC: 80 U/L (ref 35–104)
ALP BLD-CCNC: 87 U/L (ref 35–104)
ALT SERPL-CCNC: 19 U/L (ref 5–33)
ALT SERPL-CCNC: 29 U/L (ref 5–33)
ALT SERPL-CCNC: 45 U/L (ref 5–33)
AMORPHOUS: ABNORMAL
AMORPHOUS: NORMAL
ANION GAP SERPL CALCULATED.3IONS-SCNC: 11 MMOL/L (ref 9–17)
ANION GAP SERPL CALCULATED.3IONS-SCNC: 12 MMOL/L (ref 9–17)
ANION GAP SERPL CALCULATED.3IONS-SCNC: 12 MMOL/L (ref 9–17)
ANION GAP SERPL CALCULATED.3IONS-SCNC: 13 MMOL/L (ref 9–17)
ANION GAP SERPL CALCULATED.3IONS-SCNC: 15 MMOL/L (ref 9–17)
AST SERPL-CCNC: 25 U/L
AST SERPL-CCNC: 31 U/L
AST SERPL-CCNC: 37 U/L
BACTERIA: ABNORMAL
BACTERIA: NORMAL
BASOPHILS # BLD: 0 % (ref 0–1)
BASOPHILS # BLD: 1 % (ref 0–1)
BASOPHILS ABSOLUTE: 0 K/UL (ref 0–0.2)
BASOPHILS ABSOLUTE: 0 K/UL (ref 0–0.2)
BILIRUB SERPL-MCNC: 0.54 MG/DL (ref 0.3–1.2)
BILIRUB SERPL-MCNC: 0.61 MG/DL (ref 0.3–1.2)
BILIRUB SERPL-MCNC: 0.72 MG/DL (ref 0.3–1.2)
BILIRUBIN URINE: NEGATIVE
BILIRUBIN URINE: NEGATIVE
BNP INTERPRETATION: ABNORMAL
BUN BLDV-MCNC: 13 MG/DL (ref 8–23)
BUN BLDV-MCNC: 15 MG/DL (ref 8–23)
BUN BLDV-MCNC: 16 MG/DL (ref 8–23)
BUN BLDV-MCNC: 17 MG/DL (ref 8–23)
BUN BLDV-MCNC: 21 MG/DL (ref 8–23)
BUN/CREAT BLD: 20 (ref 9–20)
BUN/CREAT BLD: 21 (ref 9–20)
BUN/CREAT BLD: 21 (ref 9–20)
BUN/CREAT BLD: 22 (ref 9–20)
BUN/CREAT BLD: 27 (ref 9–20)
CALCIUM SERPL-MCNC: 10.1 MG/DL (ref 8.6–10.4)
CALCIUM SERPL-MCNC: 9.2 MG/DL (ref 8.6–10.4)
CALCIUM SERPL-MCNC: 9.4 MG/DL (ref 8.6–10.4)
CALCIUM SERPL-MCNC: 9.6 MG/DL (ref 8.6–10.4)
CALCIUM SERPL-MCNC: 9.7 MG/DL (ref 8.6–10.4)
CASTS UA: ABNORMAL /LPF (ref 0–2)
CASTS UA: NORMAL /LPF (ref 0–2)
CHLORIDE BLD-SCNC: 101 MMOL/L (ref 98–107)
CHLORIDE BLD-SCNC: 101 MMOL/L (ref 98–107)
CHLORIDE BLD-SCNC: 96 MMOL/L (ref 98–107)
CHLORIDE BLD-SCNC: 96 MMOL/L (ref 98–107)
CHLORIDE BLD-SCNC: 99 MMOL/L (ref 98–107)
CHOLESTEROL/HDL RATIO: 3.2
CHOLESTEROL: 108 MG/DL
CO2: 28 MMOL/L (ref 20–31)
CO2: 28 MMOL/L (ref 20–31)
CO2: 29 MMOL/L (ref 20–31)
CO2: 30 MMOL/L (ref 20–31)
CO2: 30 MMOL/L (ref 20–31)
COLOR: ABNORMAL
COLOR: ABNORMAL
COMMENT UA: ABNORMAL
COMMENT UA: ABNORMAL
CREAT SERPL-MCNC: 0.66 MG/DL (ref 0.5–0.9)
CREAT SERPL-MCNC: 0.67 MG/DL (ref 0.5–0.9)
CREAT SERPL-MCNC: 0.78 MG/DL (ref 0.5–0.9)
CREAT SERPL-MCNC: 0.78 MG/DL (ref 0.5–0.9)
CREAT SERPL-MCNC: 0.81 MG/DL (ref 0.5–0.9)
CREATININE URINE: 56.2 MG/DL (ref 28–217)
CRYSTALS, UA: ABNORMAL /HPF
CRYSTALS, UA: NORMAL /HPF
DIFFERENTIAL TYPE: ABNORMAL
DIFFERENTIAL TYPE: ABNORMAL
DIGOXIN DATE LAST DOSE: NORMAL
DIGOXIN DOSE AMOUNT: NORMAL
DIGOXIN DOSE TIME: NORMAL
DIGOXIN LEVEL: 1.1 NG/ML (ref 0.5–2)
EOSINOPHILS RELATIVE PERCENT: 1 % (ref 1–7)
EOSINOPHILS RELATIVE PERCENT: 2 % (ref 1–7)
EPITHELIAL CELLS UA: ABNORMAL /HPF (ref 0–5)
EPITHELIAL CELLS UA: NORMAL /HPF (ref 0–5)
ESTIMATED AVERAGE GLUCOSE: 131 MG/DL
ESTIMATED AVERAGE GLUCOSE: 151 MG/DL
GFR AFRICAN AMERICAN: >60 ML/MIN
GFR NON-AFRICAN AMERICAN: >60 ML/MIN
GFR SERPL CREATININE-BSD FRML MDRD: ABNORMAL ML/MIN/{1.73_M2}
GLUCOSE BLD-MCNC: 100 MG/DL (ref 70–99)
GLUCOSE BLD-MCNC: 108 MG/DL (ref 70–99)
GLUCOSE BLD-MCNC: 120 MG/DL (ref 70–99)
GLUCOSE BLD-MCNC: 155 MG/DL (ref 70–99)
GLUCOSE BLD-MCNC: 95 MG/DL (ref 70–99)
GLUCOSE URINE: NEGATIVE
GLUCOSE URINE: NEGATIVE
HBA1C MFR BLD: 6.2 % (ref 4.8–5.9)
HBA1C MFR BLD: 6.9 % (ref 4.8–5.9)
HCT VFR BLD CALC: 36.1 % (ref 36–46)
HCT VFR BLD CALC: 38.9 % (ref 36–46)
HDLC SERPL-MCNC: 34 MG/DL
HEMOGLOBIN: 12 G/DL (ref 12–16)
HEMOGLOBIN: 12.9 G/DL (ref 12–16)
IMMATURE GRANULOCYTES: ABNORMAL %
IMMATURE GRANULOCYTES: ABNORMAL %
INR BLD: 1.5
INR BLD: 1.5
INR BLD: 1.6
INR BLD: 1.6
INR BLD: 1.8
INR BLD: 1.8
INR BLD: 1.9
INR BLD: 1.9
INR BLD: 2
INR BLD: 2.1
INR BLD: 2.1
INR BLD: 2.2
INR BLD: 2.3
INR BLD: 2.4
INR BLD: 2.5
INR BLD: 2.7
INR BLD: 3.1
INR BLD: 7.1
KETONES, URINE: NEGATIVE
KETONES, URINE: NEGATIVE
LDL CHOLESTEROL: 43 MG/DL (ref 0–130)
LEUKOCYTE ESTERASE, URINE: ABNORMAL
LEUKOCYTE ESTERASE, URINE: NEGATIVE
LV EF: 40 %
LVEF MODALITY: NORMAL
LYMPHOCYTES # BLD: 24 % (ref 16–46)
LYMPHOCYTES # BLD: 31 % (ref 16–46)
MCH RBC QN AUTO: 29.3 PG (ref 26–34)
MCH RBC QN AUTO: 30.6 PG (ref 26–34)
MCHC RBC AUTO-ENTMCNC: 33.2 G/DL (ref 31–37)
MCHC RBC AUTO-ENTMCNC: 33.3 G/DL (ref 31–37)
MCV RBC AUTO: 88.1 FL (ref 80–100)
MCV RBC AUTO: 92.1 FL (ref 80–100)
MICROALBUMIN/CREAT 24H UR: 29 MG/L
MICROALBUMIN/CREAT UR-RTO: 52 MCG/MG CREAT
MONOCYTES # BLD: 9 % (ref 4–11)
MONOCYTES # BLD: 9 % (ref 4–11)
MUCUS: ABNORMAL
MUCUS: NORMAL
NITRITE, URINE: NEGATIVE
NITRITE, URINE: NEGATIVE
NRBC AUTOMATED: ABNORMAL PER 100 WBC
NRBC AUTOMATED: ABNORMAL PER 100 WBC
OTHER OBSERVATIONS UA: ABNORMAL
OTHER OBSERVATIONS UA: NORMAL
PDW BLD-RTO: 14.8 % (ref 11–14.5)
PDW BLD-RTO: 16.6 % (ref 11–14.5)
PH UA: 6 (ref 5–6)
PH UA: 6 (ref 5–6)
PLATELET # BLD: 129 K/UL (ref 140–450)
PLATELET # BLD: 216 K/UL (ref 140–450)
PLATELET ESTIMATE: ABNORMAL
PLATELET ESTIMATE: ABNORMAL
PMV BLD AUTO: 8 FL (ref 6–12)
PMV BLD AUTO: 8.8 FL (ref 6–12)
POTASSIUM SERPL-SCNC: 3.7 MMOL/L (ref 3.7–5.3)
POTASSIUM SERPL-SCNC: 3.8 MMOL/L (ref 3.7–5.3)
POTASSIUM SERPL-SCNC: 3.9 MMOL/L (ref 3.7–5.3)
POTASSIUM SERPL-SCNC: 4.3 MMOL/L (ref 3.7–5.3)
POTASSIUM SERPL-SCNC: 4.3 MMOL/L (ref 3.7–5.3)
PRO-BNP: 2332 PG/ML
PROTEIN UA: NEGATIVE
PROTEIN UA: NEGATIVE
PROTHROMBIN TIME: 15 SEC (ref 9.4–11.3)
PROTHROMBIN TIME: 15.3 SEC (ref 9.4–11.3)
PROTHROMBIN TIME: 16.6 SEC (ref 9.4–11.3)
PROTHROMBIN TIME: 16.7 SEC (ref 9.4–11.3)
PROTHROMBIN TIME: 18.2 SEC (ref 9.4–11.3)
PROTHROMBIN TIME: 18.2 SEC (ref 9.4–11.3)
PROTHROMBIN TIME: 19.2 SEC (ref 9.4–11.3)
PROTHROMBIN TIME: 19.2 SEC (ref 9.4–11.3)
PROTHROMBIN TIME: 20.2 SEC (ref 9.4–11.3)
PROTHROMBIN TIME: 20.7 SEC (ref 9.4–11.3)
PROTHROMBIN TIME: 22.2 SEC (ref 9.4–11.3)
PROTHROMBIN TIME: 22.5 SEC (ref 9.4–11.3)
PROTHROMBIN TIME: 22.6 SEC (ref 9.4–11.3)
PROTHROMBIN TIME: 24.1 SEC (ref 9.4–11.3)
PROTHROMBIN TIME: 25.3 SEC (ref 9.4–11.3)
PROTHROMBIN TIME: 27.2 SEC (ref 9.4–11.3)
PROTHROMBIN TIME: 30.8 SEC (ref 9.4–11.3)
PROTHROMBIN TIME: 67.1 SEC (ref 9.4–11.3)
RBC # BLD: 4.1 M/UL (ref 4–5.2)
RBC # BLD: 4.22 M/UL (ref 4–5.2)
RBC # BLD: ABNORMAL 10*6/UL
RBC # BLD: ABNORMAL 10*6/UL
RBC UA: ABNORMAL /HPF (ref 0–4)
RBC UA: NORMAL /HPF (ref 0–4)
RENAL EPITHELIAL, UA: ABNORMAL /HPF
RENAL EPITHELIAL, UA: NORMAL /HPF
SEG NEUTROPHILS: 58 % (ref 43–77)
SEG NEUTROPHILS: 65 % (ref 43–77)
SEGMENTED NEUTROPHILS ABSOLUTE COUNT: 1.5 K/UL (ref 1.8–7.7)
SEGMENTED NEUTROPHILS ABSOLUTE COUNT: 2.5 K/UL (ref 1.8–7.7)
SODIUM BLD-SCNC: 137 MMOL/L (ref 135–144)
SODIUM BLD-SCNC: 138 MMOL/L (ref 135–144)
SODIUM BLD-SCNC: 142 MMOL/L (ref 135–144)
SPECIFIC GRAVITY UA: 1.01 (ref 1.01–1.02)
SPECIFIC GRAVITY UA: 1.01 (ref 1.01–1.02)
THYROXINE, FREE: 0.92 NG/DL (ref 0.93–1.7)
THYROXINE, FREE: 1.28 NG/DL (ref 0.93–1.7)
THYROXINE, FREE: 3.16 NG/DL (ref 0.93–1.7)
TOTAL PROTEIN: 6.8 G/DL (ref 6.4–8.3)
TOTAL PROTEIN: 7.3 G/DL (ref 6.4–8.3)
TOTAL PROTEIN: 7.5 G/DL (ref 6.4–8.3)
TRICHOMONAS: ABNORMAL
TRICHOMONAS: NORMAL
TRIGL SERPL-MCNC: 156 MG/DL
TSH SERPL DL<=0.05 MIU/L-ACNC: 0.01 MIU/L (ref 0.3–5)
TSH SERPL DL<=0.05 MIU/L-ACNC: 0.02 MIU/L (ref 0.3–5)
TSH SERPL DL<=0.05 MIU/L-ACNC: 18.98 MIU/L (ref 0.3–5)
TSH SERPL DL<=0.05 MIU/L-ACNC: 6.05 MIU/L (ref 0.3–5)
TURBIDITY: ABNORMAL
TURBIDITY: ABNORMAL
URINE HGB: NEGATIVE
URINE HGB: NEGATIVE
UROBILINOGEN, URINE: NORMAL
UROBILINOGEN, URINE: NORMAL
VLDLC SERPL CALC-MCNC: ABNORMAL MG/DL (ref 1–30)
WBC # BLD: 2.6 K/UL (ref 3.5–11)
WBC # BLD: 3.8 K/UL (ref 3.5–11)
WBC # BLD: ABNORMAL 10*3/UL
WBC # BLD: ABNORMAL 10*3/UL
WBC UA: ABNORMAL /HPF (ref 0–4)
WBC UA: NORMAL /HPF (ref 0–4)
YEAST: ABNORMAL
YEAST: NORMAL

## 2018-01-01 PROCEDURE — 1123F ACP DISCUSS/DSCN MKR DOCD: CPT | Performed by: FAMILY MEDICINE

## 2018-01-01 PROCEDURE — 85610 PROTHROMBIN TIME: CPT

## 2018-01-01 PROCEDURE — G8420 CALC BMI NORM PARAMETERS: HCPCS | Performed by: INTERNAL MEDICINE

## 2018-01-01 PROCEDURE — 4040F PNEUMOC VAC/ADMIN/RCVD: CPT | Performed by: INTERNAL MEDICINE

## 2018-01-01 PROCEDURE — G0439 PPPS, SUBSEQ VISIT: HCPCS | Performed by: INTERNAL MEDICINE

## 2018-01-01 PROCEDURE — 4040F PNEUMOC VAC/ADMIN/RCVD: CPT | Performed by: ORTHOPAEDIC SURGERY

## 2018-01-01 PROCEDURE — 93793 ANTICOAG MGMT PT WARFARIN: CPT | Performed by: INTERNAL MEDICINE

## 2018-01-01 PROCEDURE — 82570 ASSAY OF URINE CREATININE: CPT

## 2018-01-01 PROCEDURE — G8420 CALC BMI NORM PARAMETERS: HCPCS | Performed by: NURSE PRACTITIONER

## 2018-01-01 PROCEDURE — G0446 INTENS BEHAVE THER CARDIO DX: HCPCS | Performed by: INTERNAL MEDICINE

## 2018-01-01 PROCEDURE — 36415 COLL VENOUS BLD VENIPUNCTURE: CPT

## 2018-01-01 PROCEDURE — 3288F FALL RISK ASSESSMENT DOCD: CPT | Performed by: NURSE PRACTITIONER

## 2018-01-01 PROCEDURE — 81001 URINALYSIS AUTO W/SCOPE: CPT

## 2018-01-01 PROCEDURE — 1036F TOBACCO NON-USER: CPT | Performed by: FAMILY MEDICINE

## 2018-01-01 PROCEDURE — 80048 BASIC METABOLIC PNL TOTAL CA: CPT

## 2018-01-01 PROCEDURE — G8598 ASA/ANTIPLAT THER USED: HCPCS | Performed by: PODIATRIST

## 2018-01-01 PROCEDURE — 84439 ASSAY OF FREE THYROXINE: CPT

## 2018-01-01 PROCEDURE — 84443 ASSAY THYROID STIM HORMONE: CPT

## 2018-01-01 PROCEDURE — 99213 OFFICE O/P EST LOW 20 MIN: CPT | Performed by: FAMILY MEDICINE

## 2018-01-01 PROCEDURE — G8482 FLU IMMUNIZE ORDER/ADMIN: HCPCS | Performed by: PODIATRIST

## 2018-01-01 PROCEDURE — G8598 ASA/ANTIPLAT THER USED: HCPCS | Performed by: NURSE PRACTITIONER

## 2018-01-01 PROCEDURE — G8598 ASA/ANTIPLAT THER USED: HCPCS | Performed by: FAMILY MEDICINE

## 2018-01-01 PROCEDURE — 11721 DEBRIDE NAIL 6 OR MORE: CPT | Performed by: PODIATRIST

## 2018-01-01 PROCEDURE — 4040F PNEUMOC VAC/ADMIN/RCVD: CPT | Performed by: PODIATRIST

## 2018-01-01 PROCEDURE — 85025 COMPLETE CBC W/AUTO DIFF WBC: CPT

## 2018-01-01 PROCEDURE — 1036F TOBACCO NON-USER: CPT | Performed by: NURSE PRACTITIONER

## 2018-01-01 PROCEDURE — 93793 ANTICOAG MGMT PT WARFARIN: CPT | Performed by: NURSE PRACTITIONER

## 2018-01-01 PROCEDURE — 1090F PRES/ABSN URINE INCON ASSESS: CPT | Performed by: FAMILY MEDICINE

## 2018-01-01 PROCEDURE — 80053 COMPREHEN METABOLIC PANEL: CPT

## 2018-01-01 PROCEDURE — 99999 PR OFFICE/OUTPT VISIT,PROCEDURE ONLY: CPT | Performed by: PODIATRIST

## 2018-01-01 PROCEDURE — 93000 ELECTROCARDIOGRAM COMPLETE: CPT | Performed by: INTERNAL MEDICINE

## 2018-01-01 PROCEDURE — G8419 CALC BMI OUT NRM PARAM NOF/U: HCPCS | Performed by: FAMILY MEDICINE

## 2018-01-01 PROCEDURE — 1123F ACP DISCUSS/DSCN MKR DOCD: CPT | Performed by: NURSE PRACTITIONER

## 2018-01-01 PROCEDURE — G0008 ADMIN INFLUENZA VIRUS VAC: HCPCS | Performed by: INTERNAL MEDICINE

## 2018-01-01 PROCEDURE — 1123F ACP DISCUSS/DSCN MKR DOCD: CPT | Performed by: PODIATRIST

## 2018-01-01 PROCEDURE — 83036 HEMOGLOBIN GLYCOSYLATED A1C: CPT

## 2018-01-01 PROCEDURE — G8419 CALC BMI OUT NRM PARAM NOF/U: HCPCS | Performed by: NURSE PRACTITIONER

## 2018-01-01 PROCEDURE — G8427 DOCREV CUR MEDS BY ELIG CLIN: HCPCS | Performed by: ORTHOPAEDIC SURGERY

## 2018-01-01 PROCEDURE — 1123F ACP DISCUSS/DSCN MKR DOCD: CPT | Performed by: INTERNAL MEDICINE

## 2018-01-01 PROCEDURE — G8420 CALC BMI NORM PARAMETERS: HCPCS | Performed by: PODIATRIST

## 2018-01-01 PROCEDURE — 1090F PRES/ABSN URINE INCON ASSESS: CPT | Performed by: INTERNAL MEDICINE

## 2018-01-01 PROCEDURE — 1036F TOBACCO NON-USER: CPT | Performed by: INTERNAL MEDICINE

## 2018-01-01 PROCEDURE — G8598 ASA/ANTIPLAT THER USED: HCPCS | Performed by: INTERNAL MEDICINE

## 2018-01-01 PROCEDURE — 3288F FALL RISK ASSESSMENT DOCD: CPT | Performed by: INTERNAL MEDICINE

## 2018-01-01 PROCEDURE — 4040F PNEUMOC VAC/ADMIN/RCVD: CPT | Performed by: NURSE PRACTITIONER

## 2018-01-01 PROCEDURE — 93289 INTERROG DEVICE EVAL HEART: CPT | Performed by: INTERNAL MEDICINE

## 2018-01-01 PROCEDURE — 99202 OFFICE O/P NEW SF 15 MIN: CPT | Performed by: PODIATRIST

## 2018-01-01 PROCEDURE — 3288F FALL RISK ASSESSMENT DOCD: CPT | Performed by: FAMILY MEDICINE

## 2018-01-01 PROCEDURE — G8427 DOCREV CUR MEDS BY ELIG CLIN: HCPCS | Performed by: FAMILY MEDICINE

## 2018-01-01 PROCEDURE — G8427 DOCREV CUR MEDS BY ELIG CLIN: HCPCS | Performed by: NURSE PRACTITIONER

## 2018-01-01 PROCEDURE — 82043 UR ALBUMIN QUANTITATIVE: CPT

## 2018-01-01 PROCEDURE — 1100F PTFALLS ASSESS-DOCD GE2>/YR: CPT | Performed by: NURSE PRACTITIONER

## 2018-01-01 PROCEDURE — 1123F ACP DISCUSS/DSCN MKR DOCD: CPT | Performed by: ORTHOPAEDIC SURGERY

## 2018-01-01 PROCEDURE — G8427 DOCREV CUR MEDS BY ELIG CLIN: HCPCS | Performed by: PODIATRIST

## 2018-01-01 PROCEDURE — 4040F PNEUMOC VAC/ADMIN/RCVD: CPT | Performed by: FAMILY MEDICINE

## 2018-01-01 PROCEDURE — G8598 ASA/ANTIPLAT THER USED: HCPCS | Performed by: ORTHOPAEDIC SURGERY

## 2018-01-01 PROCEDURE — 72220 X-RAY EXAM SACRUM TAILBONE: CPT

## 2018-01-01 PROCEDURE — 3288F FALL RISK ASSESSMENT DOCD: CPT | Performed by: PODIATRIST

## 2018-01-01 PROCEDURE — 99214 OFFICE O/P EST MOD 30 MIN: CPT | Performed by: INTERNAL MEDICINE

## 2018-01-01 PROCEDURE — 1090F PRES/ABSN URINE INCON ASSESS: CPT | Performed by: NURSE PRACTITIONER

## 2018-01-01 PROCEDURE — 3288F FALL RISK ASSESSMENT DOCD: CPT | Performed by: ORTHOPAEDIC SURGERY

## 2018-01-01 PROCEDURE — G8482 FLU IMMUNIZE ORDER/ADMIN: HCPCS | Performed by: INTERNAL MEDICINE

## 2018-01-01 PROCEDURE — G8427 DOCREV CUR MEDS BY ELIG CLIN: HCPCS | Performed by: INTERNAL MEDICINE

## 2018-01-01 PROCEDURE — 1100F PTFALLS ASSESS-DOCD GE2>/YR: CPT | Performed by: INTERNAL MEDICINE

## 2018-01-01 PROCEDURE — 99213 OFFICE O/P EST LOW 20 MIN: CPT | Performed by: ORTHOPAEDIC SURGERY

## 2018-01-01 PROCEDURE — G0444 DEPRESSION SCREEN ANNUAL: HCPCS | Performed by: INTERNAL MEDICINE

## 2018-01-01 PROCEDURE — 80061 LIPID PANEL: CPT

## 2018-01-01 PROCEDURE — 1100F PTFALLS ASSESS-DOCD GE2>/YR: CPT | Performed by: ORTHOPAEDIC SURGERY

## 2018-01-01 PROCEDURE — 1090F PRES/ABSN URINE INCON ASSESS: CPT | Performed by: PODIATRIST

## 2018-01-01 PROCEDURE — 83880 ASSAY OF NATRIURETIC PEPTIDE: CPT

## 2018-01-01 PROCEDURE — 80162 ASSAY OF DIGOXIN TOTAL: CPT

## 2018-01-01 PROCEDURE — 90662 IIV NO PRSV INCREASED AG IM: CPT | Performed by: INTERNAL MEDICINE

## 2018-01-01 PROCEDURE — 93306 TTE W/DOPPLER COMPLETE: CPT

## 2018-01-01 PROCEDURE — 1090F PRES/ABSN URINE INCON ASSESS: CPT | Performed by: ORTHOPAEDIC SURGERY

## 2018-01-01 PROCEDURE — 1036F TOBACCO NON-USER: CPT | Performed by: PODIATRIST

## 2018-01-01 PROCEDURE — 11720 DEBRIDE NAIL 1-5: CPT | Performed by: PODIATRIST

## 2018-01-01 PROCEDURE — 1036F TOBACCO NON-USER: CPT | Performed by: ORTHOPAEDIC SURGERY

## 2018-01-01 PROCEDURE — 1100F PTFALLS ASSESS-DOCD GE2>/YR: CPT | Performed by: PODIATRIST

## 2018-01-01 PROCEDURE — 99213 OFFICE O/P EST LOW 20 MIN: CPT | Performed by: NURSE PRACTITIONER

## 2018-01-01 PROCEDURE — 99214 OFFICE O/P EST MOD 30 MIN: CPT | Performed by: NURSE PRACTITIONER

## 2018-01-01 PROCEDURE — G8420 CALC BMI NORM PARAMETERS: HCPCS | Performed by: ORTHOPAEDIC SURGERY

## 2018-01-01 RX ORDER — FUROSEMIDE 40 MG/1
60 TABLET ORAL 2 TIMES DAILY
Qty: 180 TABLET | Refills: 3 | Status: SHIPPED | OUTPATIENT
Start: 2018-01-01 | End: 2019-01-01

## 2018-01-01 RX ORDER — CEFUROXIME AXETIL 500 MG/1
500 TABLET ORAL 2 TIMES DAILY
Qty: 20 TABLET | Refills: 0 | Status: SHIPPED | OUTPATIENT
Start: 2018-01-01 | End: 2018-01-01

## 2018-01-01 RX ORDER — WARFARIN SODIUM 3 MG/1
TABLET ORAL
COMMUNITY
End: 2019-01-01

## 2018-01-01 RX ORDER — WARFARIN SODIUM 4 MG/1
TABLET ORAL
COMMUNITY
End: 2019-01-01

## 2018-01-01 RX ORDER — PREDNISONE 20 MG/1
20 TABLET ORAL 2 TIMES DAILY
Qty: 6 TABLET | Refills: 0 | Status: SHIPPED | OUTPATIENT
Start: 2018-01-01 | End: 2018-01-01

## 2018-01-01 RX ORDER — RAMIPRIL 10 MG/1
CAPSULE ORAL
Qty: 90 CAPSULE | Refills: 3 | Status: SHIPPED | OUTPATIENT
Start: 2018-01-01 | End: 2018-01-01 | Stop reason: SDUPTHER

## 2018-01-01 RX ORDER — METOPROLOL TARTRATE 50 MG/1
TABLET, FILM COATED ORAL
Qty: 180 TABLET | Refills: 3 | Status: SHIPPED | OUTPATIENT
Start: 2018-01-01

## 2018-01-01 RX ORDER — METHIMAZOLE 5 MG/1
5 TABLET ORAL DAILY
Qty: 90 TABLET | Refills: 3 | Status: SHIPPED | OUTPATIENT
Start: 2018-01-01 | End: 2018-01-01 | Stop reason: SDUPTHER

## 2018-01-01 RX ORDER — DIGOXIN 125 MCG
125 TABLET ORAL DAILY
Qty: 90 TABLET | Refills: 3 | Status: SHIPPED | OUTPATIENT
Start: 2018-01-01

## 2018-01-01 RX ORDER — WARFARIN SODIUM 3 MG/1
3-4.5 TABLET ORAL DAILY
Qty: 135 TABLET | Refills: 3 | Status: SHIPPED | OUTPATIENT
Start: 2018-01-01 | End: 2018-01-01 | Stop reason: SDUPTHER

## 2018-01-01 RX ORDER — POTASSIUM CHLORIDE 20 MEQ/1
20 TABLET, EXTENDED RELEASE ORAL DAILY
Qty: 180 TABLET | Refills: 3 | Status: SHIPPED | OUTPATIENT
Start: 2018-01-01 | End: 2019-01-01 | Stop reason: SDUPTHER

## 2018-01-01 RX ORDER — WARFARIN SODIUM 3 MG/1
TABLET ORAL
Qty: 135 TABLET | Refills: 3 | Status: SHIPPED | OUTPATIENT
Start: 2018-01-01 | End: 2018-01-01 | Stop reason: CLARIF

## 2018-01-01 RX ORDER — WARFARIN SODIUM 4 MG/1
TABLET ORAL
Qty: 30 TABLET | Refills: 11 | Status: SHIPPED | OUTPATIENT
Start: 2018-01-01 | End: 2018-01-01 | Stop reason: CLARIF

## 2018-01-01 RX ORDER — PHYTONADIONE 5 MG/1
2.5 TABLET ORAL ONCE
Qty: 1 TABLET | Refills: 0 | Status: SHIPPED | OUTPATIENT
Start: 2018-01-01 | End: 2019-01-01

## 2018-01-01 RX ORDER — CHOLECALCIFEROL (VITAMIN D3) 125 MCG
5 CAPSULE ORAL NIGHTLY
COMMUNITY

## 2018-01-01 RX ORDER — FUROSEMIDE 40 MG/1
60 TABLET ORAL 2 TIMES DAILY
Qty: 180 TABLET | Refills: 3 | Status: SHIPPED | OUTPATIENT
Start: 2018-01-01 | End: 2018-01-01 | Stop reason: DRUGHIGH

## 2018-01-01 RX ORDER — WARFARIN SODIUM 5 MG/1
5 TABLET ORAL DAILY
Qty: 30 TABLET | Refills: 3 | Status: SHIPPED | OUTPATIENT
Start: 2018-01-01 | End: 2018-01-01 | Stop reason: CLARIF

## 2018-01-01 RX ORDER — METHIMAZOLE 5 MG/1
5 TABLET ORAL 4 TIMES DAILY
Qty: 120 TABLET | Refills: 11 | Status: SHIPPED | OUTPATIENT
Start: 2018-01-01 | End: 2019-01-01 | Stop reason: CLARIF

## 2018-01-01 RX ORDER — DIGOXIN 125 MCG
125 TABLET ORAL DAILY
Qty: 90 TABLET | Refills: 0 | Status: SHIPPED | OUTPATIENT
Start: 2018-01-01 | End: 2018-01-01 | Stop reason: SDUPTHER

## 2018-01-01 RX ORDER — POTASSIUM CHLORIDE 20 MEQ/1
20 TABLET, EXTENDED RELEASE ORAL DAILY
Qty: 180 TABLET | Refills: 3 | Status: SHIPPED | OUTPATIENT
Start: 2018-01-01 | End: 2018-01-01 | Stop reason: SDUPTHER

## 2018-01-01 RX ORDER — RAMIPRIL 10 MG/1
CAPSULE ORAL
Qty: 14 CAPSULE | Refills: 0 | Status: SHIPPED | OUTPATIENT
Start: 2018-01-01 | End: 2019-01-01 | Stop reason: SDUPTHER

## 2018-01-01 RX ORDER — FUROSEMIDE 40 MG/1
40 TABLET ORAL 2 TIMES DAILY
COMMUNITY
End: 2018-01-01 | Stop reason: DRUGHIGH

## 2018-01-01 RX ORDER — WARFARIN SODIUM 4 MG/1
TABLET ORAL
Qty: 30 TABLET | Refills: 11 | Status: SHIPPED | OUTPATIENT
Start: 2018-01-01 | End: 2018-01-01 | Stop reason: SDUPTHER

## 2018-01-01 ASSESSMENT — ENCOUNTER SYMPTOMS
DIARRHEA: 0
ABDOMINAL PAIN: 0
SINUS PRESSURE: 0
RESPIRATORY NEGATIVE: 1
DIARRHEA: 0
CONSTIPATION: 0
SORE THROAT: 1
SHORTNESS OF BREATH: 0
HEARTBURN: 0
COUGH: 1
SHORTNESS OF BREATH: 0
WHEEZING: 0
WHEEZING: 0
SHORTNESS OF BREATH: 0
NAUSEA: 0
CHEST TIGHTNESS: 0
BACK PAIN: 1
NAUSEA: 0
VOMITING: 0
VOMITING: 0
SPUTUM PRODUCTION: 0
RHINORRHEA: 1
ORTHOPNEA: 0
BACK PAIN: 1
COUGH: 0
BLOOD IN STOOL: 0

## 2018-01-01 ASSESSMENT — PATIENT HEALTH QUESTIONNAIRE - PHQ9: SUM OF ALL RESPONSES TO PHQ QUESTIONS 1-9: 10

## 2018-01-01 ASSESSMENT — ANXIETY QUESTIONNAIRES: GAD7 TOTAL SCORE: 3

## 2018-01-01 ASSESSMENT — LIFESTYLE VARIABLES: HOW OFTEN DO YOU HAVE A DRINK CONTAINING ALCOHOL: 0

## 2018-01-09 ENCOUNTER — ANTI-COAG VISIT (OUTPATIENT)
Dept: INTERNAL MEDICINE | Age: 83
End: 2018-01-09

## 2018-01-09 ENCOUNTER — HOSPITAL ENCOUNTER (OUTPATIENT)
Dept: LAB | Age: 83
Setting detail: SPECIMEN
Discharge: HOME | End: 2018-01-09
Payer: MEDICARE

## 2018-01-09 DIAGNOSIS — E87.6 HYPOKALEMIA: ICD-10-CM

## 2018-01-09 DIAGNOSIS — I48.20 CHRONIC ATRIAL FIBRILLATION (HCC): ICD-10-CM

## 2018-01-09 LAB
INR BLD: 1.6
POTASSIUM SERPL-SCNC: 4.3 MMOL/L (ref 3.7–5.3)
PROTHROMBIN TIME: 17.4 SEC (ref 9.4–11.3)

## 2018-01-09 PROCEDURE — 85610 PROTHROMBIN TIME: CPT

## 2018-01-09 PROCEDURE — 36415 COLL VENOUS BLD VENIPUNCTURE: CPT

## 2018-01-09 PROCEDURE — 84132 ASSAY OF SERUM POTASSIUM: CPT

## 2018-01-20 ENCOUNTER — OFFICE VISIT (OUTPATIENT)
Dept: PRIMARY CARE CLINIC | Age: 83
End: 2018-01-20
Payer: MEDICARE

## 2018-01-20 ENCOUNTER — HOSPITAL ENCOUNTER (OUTPATIENT)
Dept: GENERAL RADIOLOGY | Age: 83
Discharge: HOME | End: 2018-01-20
Payer: MEDICARE

## 2018-01-20 VITALS
SYSTOLIC BLOOD PRESSURE: 118 MMHG | DIASTOLIC BLOOD PRESSURE: 60 MMHG | HEIGHT: 65 IN | BODY MASS INDEX: 24.66 KG/M2 | OXYGEN SATURATION: 96 % | TEMPERATURE: 97.9 F | HEART RATE: 79 BPM | WEIGHT: 148 LBS

## 2018-01-20 DIAGNOSIS — R07.81 RIB PAIN ON RIGHT SIDE: Primary | ICD-10-CM

## 2018-01-20 DIAGNOSIS — R07.81 RIB PAIN ON RIGHT SIDE: ICD-10-CM

## 2018-01-20 PROCEDURE — G8427 DOCREV CUR MEDS BY ELIG CLIN: HCPCS | Performed by: FAMILY MEDICINE

## 2018-01-20 PROCEDURE — 1123F ACP DISCUSS/DSCN MKR DOCD: CPT | Performed by: FAMILY MEDICINE

## 2018-01-20 PROCEDURE — 3288F FALL RISK ASSESSMENT DOCD: CPT | Performed by: FAMILY MEDICINE

## 2018-01-20 PROCEDURE — 1036F TOBACCO NON-USER: CPT | Performed by: FAMILY MEDICINE

## 2018-01-20 PROCEDURE — 99213 OFFICE O/P EST LOW 20 MIN: CPT | Performed by: FAMILY MEDICINE

## 2018-01-20 PROCEDURE — G8484 FLU IMMUNIZE NO ADMIN: HCPCS | Performed by: FAMILY MEDICINE

## 2018-01-20 PROCEDURE — 4040F PNEUMOC VAC/ADMIN/RCVD: CPT | Performed by: FAMILY MEDICINE

## 2018-01-20 PROCEDURE — G8420 CALC BMI NORM PARAMETERS: HCPCS | Performed by: FAMILY MEDICINE

## 2018-01-20 PROCEDURE — 71101 X-RAY EXAM UNILAT RIBS/CHEST: CPT

## 2018-01-20 PROCEDURE — 1090F PRES/ABSN URINE INCON ASSESS: CPT | Performed by: FAMILY MEDICINE

## 2018-01-20 PROCEDURE — G8598 ASA/ANTIPLAT THER USED: HCPCS | Performed by: FAMILY MEDICINE

## 2018-01-20 RX ORDER — PREDNISONE 20 MG/1
TABLET ORAL
Qty: 11 TABLET | Refills: 0 | Status: SHIPPED | OUTPATIENT
Start: 2018-01-20 | End: 2018-01-31 | Stop reason: ALTCHOICE

## 2018-01-20 ASSESSMENT — ENCOUNTER SYMPTOMS
NAUSEA: 0
SHORTNESS OF BREATH: 0

## 2018-01-20 NOTE — PATIENT INSTRUCTIONS
to 30 seconds. Do this 3 or 4 times a day. Stretch just after you have applied heat. · As your pain gets better, slowly return to your normal activities. Any increased pain may be a sign that you need to rest a while longer. When should you call for help? Call 911 anytime you think you may need emergency care. For example, call if:  ? · You have chest pain or pressure. This may occur with:  ¨ Sweating. ¨ Shortness of breath. ¨ Nausea or vomiting. ¨ Pain that spreads from the chest to the neck, jaw, or one or both shoulders or arms. ¨ Dizziness or lightheadedness. ¨ A fast or uneven pulse. After calling 911, chew 1 adult-strength aspirin. Wait for an ambulance. Do not try to drive yourself. ? · You have sudden chest pain and shortness of breath, or you cough up blood. ?Call your doctor now or seek immediate medical care if:  ? · You have any trouble breathing. ? · Your chest pain gets worse. ? · Your chest pain occurs consistently with exercise and is relieved by rest.   ? Watch closely for changes in your health, and be sure to contact your doctor if:  ? · Your chest pain does not get better after 1 week. Where can you learn more? Go to https://[employer] / [employer]rid[employer]. org and sign in to your [employer] account. Enter [id number] in the [facility] box to learn more about \"Musculoskeletal Chest Pain: Care Instructions. \"     If you do not have an account, please click on the \"Sign Up Now\" link. Current as of: March 20, 2017  Content Version: 11.5  © 7304-9842 Healthwise, Incorporated. Care instructions adapted under license by [facility] [facility] If you have questions about a medical condition or this instruction, always ask your healthcare professional. [place] [address] any warranty or liability for your use of this information.

## 2018-01-20 NOTE — PROGRESS NOTES
Echo 8/16  mod-sev TR, Mod MR, EF 40%, mild decrease RVfxn    Carotid arterial disease (HCC)     no hemodynamically significant narrowing-declines follow up    Chronic venous insufficiency     CRI (chronic renal insufficiency)     Diabetes mellitus with peripheral vascular disease (HCC)     diet controlled' checks BS @ home every other day    Diverticulosis of intestine     History of    GERD (gastroesophageal reflux disease)     Gout     Hepatomegaly     secondary to fatty liver infiltrate on CT scan, 06/05 ,1.) Repeat CT 03/06, stable. 2.)Liver decreased slightly in size on CT, 01/08.  3.)Increased LFTs.  4.)Hepatitis B and C and hemochromatosis testing unremarkable 11/09    Hernia     ventral    Hyperlipidemia     Hypertension     Hyperthyroidism     declines scan/ endo    Hyponatremia     question related to diuretic, question SIADH, stable.  Hypothyroidism     in past now hyperthyroid 2015    Leukopenia     mild with a WBC of 2.5  03/08    Mitral valve regurgitation     Echocardiogram 04/12, ejection fraction 40-45%, moderate MR and LAE. Echo 4/14 EF 40-45%, mod-sev MR Mod-sevTR  Repeat Echo 2/15 EF 35%, Mod MR, Mod TR RSVP 51    Osteoarthritis     Osteopenia     on DEXA, 05/02, T -0.7 spine, T -1.2 hip. 1.)Repeat DEXA 11/04 T-1.8 spine, T-2.0 hip. 2.)DEXA scan, 12/05 T-1.9 spine, T-1.8 hip,  3. )DEXA , T-2.2 spine, T -1.5 hip 01/08    Posterior vitreous detachment     bilateral     Presbyopia     Pulmonary HTN     Echo 8/16 with RVSP 38, mild decrease RV fxn    Upper gastrointestinal bleed     with multiple gastric and duodenal ulcers, 05/04, EGD Dr Khan Husbands      Past Surgical History:   Procedure Laterality Date    APPENDECTOMY  1948    CAPSULOTOMY Left 12/11/12    YAG    CARDIAC DEFIBRILLATOR PLACEMENT  4-    replacement [facility] with dr Dayna Fofana  11/06/2017    replacement 11/06/17    CATARACT REMOVAL Bilateral     CATARACT REMOVAL WITH IMPLANT Bilateral Left 01/20/09, right 12/18/08    CHOLECYSTECTOMY      COLPORRHAPHY  1999    and posterior perineorrhaphy    CORONARY ARTERY BYPASS GRAFT  X 4   1991    CORONARY ARTERY BYPASS GRAFT  03/26/91    x4    HEMORRHOID SURGERY  1960s    procedure done    HYSTERECTOMY  1983    Total abdominal hysterectomy with bilateral salpingo-oophorectomy.  JOINT REPLACEMENT      bilat knee replacements    KNEE ARTHROPLASTY Left 11/07/02    left total knee    KNEE ARTHROPLASTY Right 04/07/04    right total knee Dr Nimesh Bhat ARTHROSCOPY Left 07/17/02    PACEMAKER PLACEMENT  12/06    and revision of lead both done 12/06, ICD placed 2009, replace battery 04/13    PACEMAKER PLACEMENT  8/01/09    Defibrillator/biventricular CRT pacemaker.     UPPER GASTROINTESTINAL ENDOSCOPY  05/26/04    w/attempted heater probe to bleeding duodenal ulcer       Social History   Substance Use Topics    Smoking status: Never Smoker    Smokeless tobacco: Never Used    Alcohol use No      Current Outpatient Prescriptions   Medication Sig Dispense Refill    potassium chloride (KLOR-CON M) 20 MEQ extended release tablet Take 20 mEq by mouth daily      omeprazole 20 MG EC tablet Take 20 mg by mouth daily       warfarin (COUMADIN) 3 MG tablet Take 1-1.5 tablets by mouth daily As directed per  tablet 3    methimazole (TAPAZOLE) 10 MG tablet TAKE 1/2 TABLET TWICE A DAY 90 tablet 3    ramipril (ALTACE) 10 MG capsule TAKE 1 CAPSULE DAILY 14 capsule 0    metoprolol tartrate (LOPRESSOR) 50 MG tablet TAKE 1 TABLET TWICE A  tablet 3    digoxin (LANOXIN) 125 MCG tablet Take 1 tablet by mouth daily 90 tablet 3    aspirin 81 MG tablet Take 81 mg by mouth daily      simvastatin (ZOCOR) 80 MG tablet TAKE 1 TABLET NIGHTLY 90 tablet 3    Handicap Placard MISC by Does not apply route EXP: 5 years 1 each 0    Multiple Vitamins-Minerals (MULTIVITAMIN PO) Take 1 tablet by mouth daily      acetaminophen (TYLENOL) 500 MG tablet Take normal. She exhibits no distension. There is no tenderness. Lymphadenopathy:     She has no cervical adenopathy. Neurological: She is alert and oriented to person, place, and time. Psychiatric: She has a normal mood and affect. Assessment:      1. Rib pain on right side  XR RIBS RIGHT INCLUDE CHEST (MIN 3 VIEWS)    DISCONTINUED: predniSONE (DELTASONE) 20 MG tablet       Plan:      X-ray of chest and R ribs negative for acute abnormality. Will start Prednisone for costochondritis. Return if symptoms worsen or fail to improve in 7 days. Orders Placed This Encounter   Procedures    XR RIBS RIGHT INCLUDE CHEST (MIN 3 VIEWS)     Standing Status:   Future     Number of Occurrences:   1     Standing Expiration Date:   1/20/2019     Order Specific Question:   Reason for exam:     Answer:   Right anterior rib pain x 1 week; pain with deep breathing. No known trauma     Orders Placed This Encounter   Medications    predniSONE (DELTASONE) 20 MG tablet     Sig: Take 2 tabs by mouth daily x 4 days, then 1 tab daily x 3 days. Dispense:  11 tablet     Refill:  0       Patient given educational materials - see patient instructions. Discussed use, benefit, and side effects of prescribed medications. All patient questions answered. Pt voiced understanding.        Electronically signed by Yadiel Liang DO on 2/5/2018 at 8:02 AM

## 2018-01-22 ENCOUNTER — HOSPITAL ENCOUNTER (OUTPATIENT)
Dept: LAB | Age: 83
Setting detail: SPECIMEN
Discharge: HOME | End: 2018-01-22
Payer: MEDICARE

## 2018-01-22 ENCOUNTER — ANTI-COAG VISIT (OUTPATIENT)
Dept: INTERNAL MEDICINE | Age: 83
End: 2018-01-22

## 2018-01-22 DIAGNOSIS — I48.20 CHRONIC ATRIAL FIBRILLATION (HCC): ICD-10-CM

## 2018-01-22 LAB
INR BLD: 1.7
PROTHROMBIN TIME: 18.1 SEC (ref 9.4–11.3)

## 2018-01-22 PROCEDURE — 85610 PROTHROMBIN TIME: CPT

## 2018-01-22 PROCEDURE — 36415 COLL VENOUS BLD VENIPUNCTURE: CPT

## 2018-01-31 ENCOUNTER — OFFICE VISIT (OUTPATIENT)
Dept: CARDIOLOGY | Age: 83
End: 2018-01-31
Payer: MEDICARE

## 2018-01-31 ENCOUNTER — TELEPHONE (OUTPATIENT)
Dept: CARDIOLOGY | Age: 83
End: 2018-01-31

## 2018-01-31 ENCOUNTER — HOSPITAL ENCOUNTER (OUTPATIENT)
Dept: LAB | Age: 83
Setting detail: SPECIMEN
Discharge: HOME | End: 2018-01-31
Payer: MEDICARE

## 2018-01-31 VITALS
BODY MASS INDEX: 23.46 KG/M2 | SYSTOLIC BLOOD PRESSURE: 116 MMHG | DIASTOLIC BLOOD PRESSURE: 62 MMHG | WEIGHT: 146 LBS | HEART RATE: 67 BPM | HEIGHT: 66 IN

## 2018-01-31 DIAGNOSIS — I27.20 PULMONARY HTN (HCC): ICD-10-CM

## 2018-01-31 DIAGNOSIS — I10 ESSENTIAL HYPERTENSION: ICD-10-CM

## 2018-01-31 DIAGNOSIS — E11.51 DIABETES MELLITUS WITH PERIPHERAL VASCULAR DISEASE (HCC): ICD-10-CM

## 2018-01-31 DIAGNOSIS — I42.9 CARDIOMYOPATHY, UNSPECIFIED TYPE (HCC): ICD-10-CM

## 2018-01-31 DIAGNOSIS — E05.90 HYPERTHYROIDISM: ICD-10-CM

## 2018-01-31 DIAGNOSIS — E78.5 DYSLIPIDEMIA: ICD-10-CM

## 2018-01-31 DIAGNOSIS — I25.810 CORONARY ARTERY DISEASE INVOLVING CORONARY BYPASS GRAFT OF NATIVE HEART WITHOUT ANGINA PECTORIS: Chronic | ICD-10-CM

## 2018-01-31 DIAGNOSIS — I48.20 CHRONIC ATRIAL FIBRILLATION (HCC): Primary | ICD-10-CM

## 2018-01-31 LAB
ABSOLUTE EOS #: 0.1 K/UL (ref 0–0.4)
ABSOLUTE IMMATURE GRANULOCYTE: ABNORMAL K/UL (ref 0–0.3)
ABSOLUTE LYMPH #: 1.1 K/UL (ref 1–4.8)
ABSOLUTE MONO #: 0.6 K/UL (ref 0.1–1.2)
ALT SERPL-CCNC: 43 U/L (ref 5–33)
ANION GAP SERPL CALCULATED.3IONS-SCNC: 14 MMOL/L (ref 9–17)
AST SERPL-CCNC: 40 U/L
BASOPHILS # BLD: 1 % (ref 0–1)
BASOPHILS ABSOLUTE: 0 K/UL (ref 0–0.2)
BUN BLDV-MCNC: 20 MG/DL (ref 8–23)
BUN/CREAT BLD: 20 (ref 9–20)
CALCIUM SERPL-MCNC: 9.9 MG/DL (ref 8.6–10.4)
CHLORIDE BLD-SCNC: 95 MMOL/L (ref 98–107)
CHOLESTEROL/HDL RATIO: 3.7
CHOLESTEROL: 128 MG/DL
CO2: 29 MMOL/L (ref 20–31)
CREAT SERPL-MCNC: 0.98 MG/DL (ref 0.5–0.9)
DIFFERENTIAL TYPE: ABNORMAL
EOSINOPHILS RELATIVE PERCENT: 1 % (ref 1–7)
ESTIMATED AVERAGE GLUCOSE: 140 MG/DL
GFR AFRICAN AMERICAN: >60 ML/MIN
GFR NON-AFRICAN AMERICAN: 54 ML/MIN
GFR SERPL CREATININE-BSD FRML MDRD: ABNORMAL ML/MIN/{1.73_M2}
GFR SERPL CREATININE-BSD FRML MDRD: ABNORMAL ML/MIN/{1.73_M2}
GLUCOSE BLD-MCNC: 89 MG/DL (ref 70–99)
HBA1C MFR BLD: 6.5 % (ref 4.8–5.9)
HCT VFR BLD CALC: 40.5 % (ref 36–46)
HDLC SERPL-MCNC: 35 MG/DL
HEMOGLOBIN: 13.7 G/DL (ref 12–16)
IMMATURE GRANULOCYTES: ABNORMAL %
LDL CHOLESTEROL: 29 MG/DL (ref 0–130)
LYMPHOCYTES # BLD: 20 % (ref 16–46)
MCH RBC QN AUTO: 31.1 PG (ref 26–34)
MCHC RBC AUTO-ENTMCNC: 33.8 G/DL (ref 31–37)
MCV RBC AUTO: 91.8 FL (ref 80–100)
MONOCYTES # BLD: 10 % (ref 4–11)
NRBC AUTOMATED: ABNORMAL PER 100 WBC
PDW BLD-RTO: 16.1 % (ref 11–14.5)
PLATELET # BLD: 188 K/UL (ref 140–450)
PLATELET ESTIMATE: ABNORMAL
PMV BLD AUTO: 7.7 FL (ref 6–12)
POTASSIUM SERPL-SCNC: 4.1 MMOL/L (ref 3.7–5.3)
RBC # BLD: 4.41 M/UL (ref 4–5.2)
RBC # BLD: ABNORMAL 10*6/UL
SEG NEUTROPHILS: 68 % (ref 43–77)
SEGMENTED NEUTROPHILS ABSOLUTE COUNT: 3.8 K/UL (ref 1.8–7.7)
SODIUM BLD-SCNC: 138 MMOL/L (ref 135–144)
TOTAL CK: 44 U/L (ref 26–192)
TRIGL SERPL-MCNC: 321 MG/DL
TSH SERPL DL<=0.05 MIU/L-ACNC: 4.51 MIU/L (ref 0.3–5)
VLDLC SERPL CALC-MCNC: ABNORMAL MG/DL (ref 1–30)
WBC # BLD: 5.6 K/UL (ref 3.5–11)
WBC # BLD: ABNORMAL 10*3/UL

## 2018-01-31 PROCEDURE — 99214 OFFICE O/P EST MOD 30 MIN: CPT | Performed by: INTERNAL MEDICINE

## 2018-01-31 PROCEDURE — 84443 ASSAY THYROID STIM HORMONE: CPT

## 2018-01-31 PROCEDURE — 84450 TRANSFERASE (AST) (SGOT): CPT

## 2018-01-31 PROCEDURE — 36415 COLL VENOUS BLD VENIPUNCTURE: CPT

## 2018-01-31 PROCEDURE — 85025 COMPLETE CBC W/AUTO DIFF WBC: CPT

## 2018-01-31 PROCEDURE — 93000 ELECTROCARDIOGRAM COMPLETE: CPT | Performed by: INTERNAL MEDICINE

## 2018-01-31 PROCEDURE — 80061 LIPID PANEL: CPT

## 2018-01-31 PROCEDURE — 82550 ASSAY OF CK (CPK): CPT

## 2018-01-31 PROCEDURE — 83036 HEMOGLOBIN GLYCOSYLATED A1C: CPT

## 2018-01-31 PROCEDURE — 80048 BASIC METABOLIC PNL TOTAL CA: CPT

## 2018-01-31 PROCEDURE — 84460 ALANINE AMINO (ALT) (SGPT): CPT

## 2018-01-31 RX ORDER — FUROSEMIDE 40 MG/1
40 TABLET ORAL DAILY
Qty: 90 TABLET | Refills: 3 | Status: SHIPPED | OUTPATIENT
Start: 2018-01-31 | End: 2018-04-09 | Stop reason: DRUGHIGH

## 2018-01-31 RX ORDER — NITROGLYCERIN 0.4 MG/1
0.4 TABLET SUBLINGUAL PRN
Qty: 25 TABLET | Refills: 3 | Status: SHIPPED | OUTPATIENT
Start: 2018-01-31

## 2018-01-31 NOTE — PROGRESS NOTES
vertigo. No mouth sores or sore throat. · Cardiovascular: As above. · Respiratory: Yes SOB, cough or hemoptysis. · Gastrointestinal: No abdominal pain, appetite loss, blood in stools. No change in bowel or bladder habits. · Genitourinary: No dysuria, trouble voiding, or hematuria. · Musculoskeletal:  No gait disturbance, No weakness. Yes for joint complaints. · Integumentary: No rash or pruritis. · Neurological: No headache, diplopia, change in muscle strength, numbness or tingling. No change in gait, balance, coordination, mood, affect, memory, mentation, behavior. · Psychiatric: No anxiety, or depression. · Endocrine: No temperature intolerance. No excessive thirst, fluid intake, or urination. No tremor. · Hematologic/Lymphatic: No abnormal bruising or bleeding, blood clots or swollen lymph nodes. · Allergic/Immunologic: No nasal congestion or hives. Physical Exam:  /62   Pulse 67   Ht 5' 6\" (1.676 m)   Wt 146 lb (66.2 kg)   BMI 23.57 kg/m²   Constitutional and General Appearance: alert, cooperative, no distress and appears stated age  [de-identified]: PERRL, no cervical lymphadenopathy. No masses palpable. Normal oral mucosa  Respiratory:  · Normal excursion and expansion without use of accessory muscles  · Resp Auscultation: Good respiratory effort. No for increased work of breathing. On auscultation: clear to auscultation bilaterally  Cardiovascular:  · The apical impulse is not displaced  · Heart tones are crisp and normal. regular S1 and S2. III/VI holosystolic murmur at the apex. · Jugular venous pulsation Normal  · The carotid upstroke is normal in amplitude and contour without delay or bruit  · Peripheral pulses are symmetrical and full   Abdomen:   · No masses or tenderness  · Bowel sounds present  Extremities:  ·  No Cyanosis or Clubbing  ·  Lower extremity edema: Yes  ·  Skin: Warm and dry  Neurological:  · Alert and oriented.   · Moves all extremities well  · No abnormalities of mood, affect, memory, mentation, or behavior are noted    Cardiac Data:  Diagnostics:       EKG: vent. Paced rhythm with underlying atrial fibrillation. Labs:   FASTING LIPID PANEL:  Lab Results   Component Value Date    HDL 29 01/25/2017    TRIG 218 01/25/2017       IMPRESSION:    Patient Active Problem List   Diagnosis    Encounter for servicing of automatic implantable cardioverter-defibrillator (AICD) at end of battery life    Mitral regurgitation    Osteopenia    Osteoarthritis    Chronic venous insufficiency    Gout    Astigmatism of both eyes with presbyopia    Pseudophakia of both eyes    Hyperthyroidism    Chronic atrial fibrillation ([address]    Essential hypertension    Coronary artery disease involving coronary bypass graft of native heart without angina pectoris    Dyslipidemia    Chronic kidney disease    Cardiomyopathy ([address]    Diabetes mellitus with peripheral vascular disease ([address]    Pulmonary HTN     Echo 02/2015:  1. Left ventricular dimension is normal.  2. Left ventricular systolic function is decreased. Ejection fraction is 35%. 3. Global hypokinesis is noted. 4. Biatrial enlargement. 5. Right ventricle is normal in dimension with decreased function. 6. Mitral annular calcification. Moderate mitral regurgitation. 7. Sclerotic aortic valve. 8. Moderate tricuspid regurgitation. RVSP is 51mm Hg. 9. ICD wire noted in right heart chambers. 10. No pericardial effusion. Echo 08/08/2016:  1. Normal left ventricular dimension and wall thickness. 2.   Moderately decreased systolic function with global hypokinesis. Ejection fraction is 40%. 3.   Biatrial enlargement. 4.   Mildly dilated right ventricle with mildly reduced function. 5.   Mitral annular calcification. Moderate mitral regurgitation. 6.   Sclerotic aortic valve with trivial aortic regurgitation. 7.   Moderate to severe tricuspid regurgitation. RVSP is 39 mm Hg. 8.   No pericardial effusion.   9.

## 2018-01-31 NOTE — TELEPHONE ENCOUNTER
[facility] called pt about medication. Pt thought she was to take 1 40 mg lasix twice daily. Per Dr Kimberley Nieto note pt was to be reduced to 40 mg lasix once daily. Pharmacy was going to call pt back.   Writer instructed pharmacy to have the pt call the office if she was still unsure of how to take the medication

## 2018-02-08 ENCOUNTER — HOSPITAL ENCOUNTER (OUTPATIENT)
Dept: LAB | Age: 83
Setting detail: SPECIMEN
Discharge: HOME | End: 2018-02-08
Payer: MEDICARE

## 2018-02-08 ENCOUNTER — ANTI-COAG VISIT (OUTPATIENT)
Dept: INTERNAL MEDICINE | Age: 83
End: 2018-02-08

## 2018-02-08 ENCOUNTER — OFFICE VISIT (OUTPATIENT)
Dept: INTERNAL MEDICINE | Age: 83
End: 2018-02-08
Payer: MEDICARE

## 2018-02-08 VITALS
HEART RATE: 68 BPM | SYSTOLIC BLOOD PRESSURE: 128 MMHG | WEIGHT: 151 LBS | BODY MASS INDEX: 23.7 KG/M2 | DIASTOLIC BLOOD PRESSURE: 66 MMHG | HEIGHT: 67 IN

## 2018-02-08 DIAGNOSIS — I25.810 CORONARY ARTERY DISEASE INVOLVING CORONARY BYPASS GRAFT OF NATIVE HEART WITHOUT ANGINA PECTORIS: Primary | Chronic | ICD-10-CM

## 2018-02-08 DIAGNOSIS — E05.90 HYPERTHYROIDISM: ICD-10-CM

## 2018-02-08 DIAGNOSIS — I34.0 MITRAL VALVE INSUFFICIENCY, UNSPECIFIED ETIOLOGY: Chronic | ICD-10-CM

## 2018-02-08 DIAGNOSIS — E11.51 DIABETES MELLITUS WITH PERIPHERAL VASCULAR DISEASE (HCC): ICD-10-CM

## 2018-02-08 DIAGNOSIS — M85.80 OSTEOPENIA, UNSPECIFIED LOCATION: ICD-10-CM

## 2018-02-08 DIAGNOSIS — I42.9 CARDIOMYOPATHY, UNSPECIFIED TYPE (HCC): ICD-10-CM

## 2018-02-08 DIAGNOSIS — R79.89 ELEVATED LFTS: ICD-10-CM

## 2018-02-08 DIAGNOSIS — Z23 NEED FOR PROPHYLACTIC VACCINATION AGAINST STREPTOCOCCUS PNEUMONIAE (PNEUMOCOCCUS): ICD-10-CM

## 2018-02-08 DIAGNOSIS — I10 ESSENTIAL HYPERTENSION: ICD-10-CM

## 2018-02-08 DIAGNOSIS — I48.20 CHRONIC ATRIAL FIBRILLATION (HCC): ICD-10-CM

## 2018-02-08 DIAGNOSIS — E78.5 DYSLIPIDEMIA: ICD-10-CM

## 2018-02-08 LAB
ALT SERPL-CCNC: 24 U/L (ref 5–33)
AST SERPL-CCNC: 26 U/L
INR BLD: 1.9
PROTHROMBIN TIME: 20.1 SEC (ref 9.4–11.3)

## 2018-02-08 PROCEDURE — G8427 DOCREV CUR MEDS BY ELIG CLIN: HCPCS | Performed by: INTERNAL MEDICINE

## 2018-02-08 PROCEDURE — 1090F PRES/ABSN URINE INCON ASSESS: CPT | Performed by: INTERNAL MEDICINE

## 2018-02-08 PROCEDURE — 3288F FALL RISK ASSESSMENT DOCD: CPT | Performed by: INTERNAL MEDICINE

## 2018-02-08 PROCEDURE — 85610 PROTHROMBIN TIME: CPT

## 2018-02-08 PROCEDURE — 36415 COLL VENOUS BLD VENIPUNCTURE: CPT

## 2018-02-08 PROCEDURE — 90670 PCV13 VACCINE IM: CPT | Performed by: INTERNAL MEDICINE

## 2018-02-08 PROCEDURE — 84450 TRANSFERASE (AST) (SGOT): CPT

## 2018-02-08 PROCEDURE — G8420 CALC BMI NORM PARAMETERS: HCPCS | Performed by: INTERNAL MEDICINE

## 2018-02-08 PROCEDURE — 99214 OFFICE O/P EST MOD 30 MIN: CPT | Performed by: INTERNAL MEDICINE

## 2018-02-08 PROCEDURE — G8598 ASA/ANTIPLAT THER USED: HCPCS | Performed by: INTERNAL MEDICINE

## 2018-02-08 PROCEDURE — 1123F ACP DISCUSS/DSCN MKR DOCD: CPT | Performed by: INTERNAL MEDICINE

## 2018-02-08 PROCEDURE — 4040F PNEUMOC VAC/ADMIN/RCVD: CPT | Performed by: INTERNAL MEDICINE

## 2018-02-08 PROCEDURE — 84460 ALANINE AMINO (ALT) (SGPT): CPT

## 2018-02-08 PROCEDURE — G8484 FLU IMMUNIZE NO ADMIN: HCPCS | Performed by: INTERNAL MEDICINE

## 2018-02-08 PROCEDURE — G0009 ADMIN PNEUMOCOCCAL VACCINE: HCPCS | Performed by: INTERNAL MEDICINE

## 2018-02-08 PROCEDURE — 1036F TOBACCO NON-USER: CPT | Performed by: INTERNAL MEDICINE

## 2018-02-08 NOTE — PROGRESS NOTES
[place] received counseling on the following healthy behaviors: exercise  Reviewed prior labs and health maintenance  Continue current medications except where noted below, diet and exercise. Discussed use, benefit, and side effects of prescribed medications. Barriers to medication compliance addressed. Patient given educational materials - see patient instructions  Was a self-tracking handout given in paper form or via [employer]? No:     Requested Prescriptions      No prescriptions requested or ordered in this encounter       All patient questions answered. Patient voiced understanding. Quality Measures    Body mass index is 24.01 kg/m². Normal. Weight control planned discussed: healthy diet and regular exercise. BP: 128/66. Blood pressure is normal. Treatment plan consists of: see progress note below. Fall Risk 10/25/2017 4/20/2017 7/26/2016 5/6/2015 4/18/2014   2 or more falls in past year? yes no yes no no   Fall with injury in past year? no no yes no no     The patient has a history of falls. I did not - not indicated , complete a risk assessment for falls.  A plan of care for falls No Treatment plan indicated    Lab Results   Component Value Date    LDLCHOLESTEROL 29 01/31/2018    (goal LDL reduction with dx if diabetes is 50% LDL reduction)    PHQ Scores 4/20/2017 1/26/2016 1/9/2015 1/17/2014   PHQ2 Score 0 1 0 0   PHQ9 Score 0 1 0 0     Interpretation of Total Score Depression Severity: 1-4 = Minimal depression, 5-9 = Mild depression, 10-14 = Moderate depression, 15-19 = Moderately severe depression, 20-27 = Severe depression

## 2018-02-15 ENCOUNTER — HOSPITAL ENCOUNTER (OUTPATIENT)
Dept: LAB | Age: 83
Setting detail: SPECIMEN
Discharge: HOME | End: 2018-02-15
Payer: MEDICARE

## 2018-02-15 ENCOUNTER — ANTI-COAG VISIT (OUTPATIENT)
Dept: INTERNAL MEDICINE | Age: 83
End: 2018-02-15

## 2018-02-15 DIAGNOSIS — I48.20 CHRONIC ATRIAL FIBRILLATION (HCC): ICD-10-CM

## 2018-02-15 LAB
INR BLD: 1.9
PROTHROMBIN TIME: 20.7 SEC (ref 9.4–11.3)

## 2018-02-15 PROCEDURE — 85610 PROTHROMBIN TIME: CPT

## 2018-02-15 PROCEDURE — 36415 COLL VENOUS BLD VENIPUNCTURE: CPT

## 2018-02-15 NOTE — PROGRESS NOTES
Take 4.5 mg on Coumadin today, then go back to 4.5 mg on Mon, Wed, Fri and 3 mg all other days.  Recheck INR in 8 days (2/23/18)

## 2018-02-20 ENCOUNTER — OFFICE VISIT (OUTPATIENT)
Dept: OPTOMETRY | Age: 83
End: 2018-02-20
Payer: COMMERCIAL

## 2018-02-20 DIAGNOSIS — H52.202 MYOPIA OF LEFT EYE WITH ASTIGMATISM AND PRESBYOPIA: ICD-10-CM

## 2018-02-20 DIAGNOSIS — E11.9 NON-INSULIN DEPENDENT TYPE 2 DIABETES MELLITUS (HCC): Primary | ICD-10-CM

## 2018-02-20 DIAGNOSIS — Z96.1 PSEUDOPHAKIA OF BOTH EYES: ICD-10-CM

## 2018-02-20 DIAGNOSIS — H52.01 HYPEROPIA OF RIGHT EYE WITH ASTIGMATISM AND PRESBYOPIA: ICD-10-CM

## 2018-02-20 DIAGNOSIS — H52.12 MYOPIA OF LEFT EYE WITH ASTIGMATISM AND PRESBYOPIA: ICD-10-CM

## 2018-02-20 DIAGNOSIS — H52.201 HYPEROPIA OF RIGHT EYE WITH ASTIGMATISM AND PRESBYOPIA: ICD-10-CM

## 2018-02-20 DIAGNOSIS — H52.4 MYOPIA OF LEFT EYE WITH ASTIGMATISM AND PRESBYOPIA: ICD-10-CM

## 2018-02-20 DIAGNOSIS — H52.4 HYPEROPIA OF RIGHT EYE WITH ASTIGMATISM AND PRESBYOPIA: ICD-10-CM

## 2018-02-20 PROCEDURE — 92014 COMPRE OPH EXAM EST PT 1/>: CPT | Performed by: OPTOMETRIST

## 2018-02-20 RX ORDER — BENOXINATE HCL/FLUORESCEIN SOD 0.4%-0.25%
1 DROPS OPHTHALMIC (EYE) ONCE
Status: COMPLETED | OUTPATIENT
Start: 2018-02-20 | End: 2018-02-20

## 2018-02-20 RX ORDER — TROPICAMIDE 10 MG/ML
1 SOLUTION/ DROPS OPHTHALMIC ONCE
Status: COMPLETED | OUTPATIENT
Start: 2018-02-20 | End: 2018-02-20

## 2018-02-20 RX ORDER — PHENYLEPHRINE HCL 2.5 %
1 DROPS OPHTHALMIC (EYE) ONCE
Status: COMPLETED | OUTPATIENT
Start: 2018-02-20 | End: 2018-02-20

## 2018-02-20 RX ADMIN — Medication 1 DROP: at 10:07

## 2018-02-20 RX ADMIN — Medication 1 DROP: at 10:06

## 2018-02-20 RX ADMIN — TROPICAMIDE 1 DROP: 10 SOLUTION/ DROPS OPHTHALMIC at 10:07

## 2018-02-20 ASSESSMENT — REFRACTION_WEARINGRX
OS_SPHERE: -0.50
OD_ADD: +2.50
SPECS_TYPE: BIFOCAL
OS_CYLINDER: -1.50
OD_CYLINDER: -1.75
OS_ADD: 2.50
OD_SPHERE: +1.00
OD_AXIS: 108
OS_AXIS: 061

## 2018-02-20 ASSESSMENT — REFRACTION_MANIFEST
OS_CYLINDER: -1.75
OD_CYLINDER: -1.75
OS_ADD: +2.50
OS_AXIS: 060
OD_ADD: +2.50
OD_AXIS: 114
OS_SPHERE: -0.50
OD_SPHERE: +0.75

## 2018-02-20 ASSESSMENT — VISUAL ACUITY
CORRECTION_TYPE: GLASSES
OS_CC: 20/20
OD_CC+: -2
OS_CC+: -1
METHOD: SNELLEN - LINEAR
OD_CC: 20/30 OU

## 2018-02-20 ASSESSMENT — TONOMETRY
OD_IOP_MMHG: 10
OS_IOP_MMHG: 12
IOP_METHOD: PALPATION

## 2018-02-20 NOTE — PROGRESS NOTES
Hardy Alcocer presents today for   Chief Complaint   Patient presents with    Ophth Diabetic Exam    Vision Exam   .    HPI     Last Vision Exam: 10/13/2015 Dr Rosaura Becker  Last Ophthalmology Exam: 10/20/2016 Dr Jose Francisco Marcano Rx: 10/2015  Insurance: [email or web address] for e,f,l  Update:  Lenses? only  Diabetic:  Sugars: doesn't check   HmgA1C: 6.5 on 1/31/2018  Would like checked for cataracts, pt states images on tv look \"different\"  No other vision changes  Pt ok to be dilated today, has                       Main Ophthalmology Exam     External Exam       Right Left    External Normal Normal          Slit Lamp Exam       Right Left    Lens Posterior chamber intraocular lens Posterior chamber intraocular lens, Open posterior capsule          Fundus Exam       Right Left    Disc Normal Normal    C/D Ratio 0.15 0.15    Macula Normal Normal    Vessels Normal Normal    Periphery Normal Normal    78 diopter   No diabetic retinopathy                   Tonometry     Tonometry (Palpation, 10:15 AM)       Right Left    Pressure 10 12                Visual Acuity (Snellen - Linear)       Right Left    Dist cc 20/20 -2 20/20 -1    Near cc 20/30 OU     Correction:  Glasses         Not recorded          Ophthalmology Exam     Wearing Rx       Sphere Cylinder Axis Add    Right +1.00 -1.75 108 +2.50    Left -0.50 -1.50 061 2.50    Type:  Bifocal                Manifest Refraction     Manifest Refraction       Sphere Cylinder Axis Dist VA Add    Right +0.75 -1.75 114 20/20- +2.50    Left -0.50 -1.75 060 20/20- +2.50          Manifest Refraction #2 (Auto)       Sphere Cylinder Axis Dist VA Add    Right +0.75 -1.50 118      Left -0.50 -1.75 048                 Final Rx       Sphere Cylinder Axis Add    Right +0.75 -1.75 114 +2.50    Left -0.50 -1.75 060 +2.50    Type:  Trifocal    Expiration Date:  2/21/2020            No diabetic retinopathy   1. Non-insulin dependent type 2 diabetes mellitus ([address]    2.  Hyperopia of

## 2018-02-23 ENCOUNTER — ANTI-COAG VISIT (OUTPATIENT)
Dept: INTERNAL MEDICINE | Age: 83
End: 2018-02-23

## 2018-02-23 ENCOUNTER — HOSPITAL ENCOUNTER (OUTPATIENT)
Dept: LAB | Age: 83
Setting detail: SPECIMEN
Discharge: HOME | End: 2018-02-23
Payer: MEDICARE

## 2018-02-23 DIAGNOSIS — I48.20 CHRONIC ATRIAL FIBRILLATION (HCC): ICD-10-CM

## 2018-02-23 LAB
INR BLD: 2.2
PROTHROMBIN TIME: 23.4 SEC (ref 9.4–11.3)

## 2018-02-23 PROCEDURE — 85610 PROTHROMBIN TIME: CPT

## 2018-02-23 PROCEDURE — 36415 COLL VENOUS BLD VENIPUNCTURE: CPT

## 2018-03-12 ENCOUNTER — HOSPITAL ENCOUNTER (OUTPATIENT)
Dept: LAB | Age: 83
Setting detail: SPECIMEN
Discharge: HOME | End: 2018-03-12
Payer: MEDICARE

## 2018-03-12 ENCOUNTER — ANTI-COAG VISIT (OUTPATIENT)
Dept: INTERNAL MEDICINE | Age: 83
End: 2018-03-12

## 2018-03-12 DIAGNOSIS — I48.20 CHRONIC ATRIAL FIBRILLATION (HCC): ICD-10-CM

## 2018-03-12 LAB
INR BLD: 2.1
PROTHROMBIN TIME: 22.1 SEC (ref 9.4–11.3)

## 2018-03-12 PROCEDURE — 85610 PROTHROMBIN TIME: CPT

## 2018-03-12 PROCEDURE — 36415 COLL VENOUS BLD VENIPUNCTURE: CPT

## 2018-03-22 ENCOUNTER — HOSPITAL ENCOUNTER (OUTPATIENT)
Dept: LAB | Age: 83
Setting detail: SPECIMEN
Discharge: HOME | End: 2018-03-22
Payer: MEDICARE

## 2018-03-22 ENCOUNTER — ANTI-COAG VISIT (OUTPATIENT)
Dept: INTERNAL MEDICINE | Age: 83
End: 2018-03-22

## 2018-03-22 DIAGNOSIS — I48.20 CHRONIC ATRIAL FIBRILLATION (HCC): ICD-10-CM

## 2018-03-22 LAB
INR BLD: 2.4
PROTHROMBIN TIME: 25.6 SEC (ref 9.4–11.3)

## 2018-03-22 PROCEDURE — 85610 PROTHROMBIN TIME: CPT

## 2018-03-22 PROCEDURE — 36415 COLL VENOUS BLD VENIPUNCTURE: CPT

## 2018-03-27 ENCOUNTER — OFFICE VISIT (OUTPATIENT)
Dept: OPTOMETRY | Age: 83
End: 2018-03-27
Payer: MEDICARE

## 2018-03-27 DIAGNOSIS — H53.2 DIPLOPIA: Primary | ICD-10-CM

## 2018-03-27 PROCEDURE — G8482 FLU IMMUNIZE ORDER/ADMIN: HCPCS | Performed by: OPTOMETRIST

## 2018-03-27 PROCEDURE — 99212 OFFICE O/P EST SF 10 MIN: CPT | Performed by: OPTOMETRIST

## 2018-03-27 PROCEDURE — 3288F FALL RISK ASSESSMENT DOCD: CPT | Performed by: OPTOMETRIST

## 2018-03-27 PROCEDURE — 1090F PRES/ABSN URINE INCON ASSESS: CPT | Performed by: OPTOMETRIST

## 2018-03-27 PROCEDURE — G8420 CALC BMI NORM PARAMETERS: HCPCS | Performed by: OPTOMETRIST

## 2018-03-27 PROCEDURE — 1123F ACP DISCUSS/DSCN MKR DOCD: CPT | Performed by: OPTOMETRIST

## 2018-03-27 PROCEDURE — G8427 DOCREV CUR MEDS BY ELIG CLIN: HCPCS | Performed by: OPTOMETRIST

## 2018-03-27 PROCEDURE — 4040F PNEUMOC VAC/ADMIN/RCVD: CPT | Performed by: OPTOMETRIST

## 2018-03-27 PROCEDURE — G8598 ASA/ANTIPLAT THER USED: HCPCS | Performed by: OPTOMETRIST

## 2018-03-27 PROCEDURE — 1036F TOBACCO NON-USER: CPT | Performed by: OPTOMETRIST

## 2018-03-27 ASSESSMENT — REFRACTION_MANIFEST
OD_ADD: +2.50
OD_CYLINDER: -1.75
OS_SPHERE: -0.50
OD_SPHERE: +0.75
OS_CYLINDER: -1.75
OS_AXIS: 060
OS_ADD: +2.50
OD_AXIS: 114

## 2018-03-27 ASSESSMENT — VISUAL ACUITY
METHOD: SNELLEN - LINEAR
CORRECTION_TYPE: GLASSES
OS_CC: 20/25

## 2018-03-27 ASSESSMENT — KERATOMETRY
OD_AXISANGLE_DEGREES: 045
OD_K2POWER_DIOPTERS: 44.25
OD_AXISANGLE2_DEGREES: 135
OS_K1POWER_DIOPTERS: 43.50
OS_AXISANGLE2_DEGREES: 028
OD_K1POWER_DIOPTERS: 43.00
OS_AXISANGLE_DEGREES: 118
OS_K2POWER_DIOPTERS: 45.00

## 2018-03-27 ASSESSMENT — REFRACTION_WEARINGRX
OS_CYLINDER: -1.50
OS_SPHERE: -0.50
OD_CYLINDER: -1.75
OS_ADD: +2.50
OD_AXIS: 108
OD_ADD: +2.50
OD_SPHERE: +1.00
OS_AXIS: 061
SPECS_TYPE: BIFOCAL

## 2018-03-29 RX ORDER — SIMVASTATIN 80 MG
TABLET ORAL
Qty: 90 TABLET | Refills: 3 | Status: SHIPPED | OUTPATIENT
Start: 2018-03-29 | End: 2019-01-01 | Stop reason: SDUPTHER

## 2018-03-30 ENCOUNTER — TELEPHONE (OUTPATIENT)
Dept: INTERNAL MEDICINE | Age: 83
End: 2018-03-30

## 2018-04-02 ENCOUNTER — ANTI-COAG VISIT (OUTPATIENT)
Dept: INTERNAL MEDICINE | Age: 83
End: 2018-04-02

## 2018-04-02 ENCOUNTER — HOSPITAL ENCOUNTER (OUTPATIENT)
Dept: LAB | Age: 83
Setting detail: SPECIMEN
Discharge: HOME | End: 2018-04-02
Payer: MEDICARE

## 2018-04-02 DIAGNOSIS — I48.20 CHRONIC ATRIAL FIBRILLATION (HCC): ICD-10-CM

## 2018-04-02 LAB
INR BLD: 2.4
PROTHROMBIN TIME: 26.1 SEC (ref 9.4–11.3)

## 2018-04-02 PROCEDURE — 85610 PROTHROMBIN TIME: CPT

## 2018-04-02 PROCEDURE — 36415 COLL VENOUS BLD VENIPUNCTURE: CPT

## 2018-04-09 ENCOUNTER — OFFICE VISIT (OUTPATIENT)
Dept: INTERNAL MEDICINE | Age: 83
End: 2018-04-09
Payer: MEDICARE

## 2018-04-09 VITALS
SYSTOLIC BLOOD PRESSURE: 130 MMHG | RESPIRATION RATE: 16 BRPM | HEIGHT: 67 IN | DIASTOLIC BLOOD PRESSURE: 66 MMHG | HEART RATE: 78 BPM | WEIGHT: 154.8 LBS | BODY MASS INDEX: 24.3 KG/M2

## 2018-04-09 DIAGNOSIS — E11.51 CONTROLLED TYPE 2 DIABETES MELLITUS WITH DIABETIC PERIPHERAL ANGIOPATHY WITHOUT GANGRENE, WITHOUT LONG-TERM CURRENT USE OF INSULIN (HCC): Primary | ICD-10-CM

## 2018-04-09 DIAGNOSIS — I27.20 PULMONARY HTN (HCC): ICD-10-CM

## 2018-04-09 DIAGNOSIS — M15.9 PRIMARY OSTEOARTHRITIS INVOLVING MULTIPLE JOINTS: ICD-10-CM

## 2018-04-09 DIAGNOSIS — N18.30 STAGE 3 CHRONIC KIDNEY DISEASE (HCC): ICD-10-CM

## 2018-04-09 DIAGNOSIS — E78.2 MIXED HYPERLIPIDEMIA: ICD-10-CM

## 2018-04-09 DIAGNOSIS — I10 ESSENTIAL HYPERTENSION: ICD-10-CM

## 2018-04-09 DIAGNOSIS — I42.9 CARDIOMYOPATHY, UNSPECIFIED TYPE (HCC): ICD-10-CM

## 2018-04-09 DIAGNOSIS — Z91.81 AT HIGH RISK FOR FALLS: ICD-10-CM

## 2018-04-09 DIAGNOSIS — E05.90 HYPERTHYROIDISM: ICD-10-CM

## 2018-04-09 DIAGNOSIS — I25.810 CORONARY ARTERY DISEASE INVOLVING CORONARY BYPASS GRAFT OF NATIVE HEART WITHOUT ANGINA PECTORIS: Chronic | ICD-10-CM

## 2018-04-09 DIAGNOSIS — M1A.00X0 IDIOPATHIC CHRONIC GOUT WITHOUT TOPHUS, UNSPECIFIED SITE: ICD-10-CM

## 2018-04-09 DIAGNOSIS — I48.20 CHRONIC ATRIAL FIBRILLATION (HCC): ICD-10-CM

## 2018-04-09 DIAGNOSIS — M85.80 OSTEOPENIA, UNSPECIFIED LOCATION: ICD-10-CM

## 2018-04-09 PROCEDURE — G8420 CALC BMI NORM PARAMETERS: HCPCS | Performed by: INTERNAL MEDICINE

## 2018-04-09 PROCEDURE — G8598 ASA/ANTIPLAT THER USED: HCPCS | Performed by: INTERNAL MEDICINE

## 2018-04-09 PROCEDURE — G8427 DOCREV CUR MEDS BY ELIG CLIN: HCPCS | Performed by: INTERNAL MEDICINE

## 2018-04-09 PROCEDURE — 1036F TOBACCO NON-USER: CPT | Performed by: INTERNAL MEDICINE

## 2018-04-09 PROCEDURE — 1123F ACP DISCUSS/DSCN MKR DOCD: CPT | Performed by: INTERNAL MEDICINE

## 2018-04-09 PROCEDURE — 4040F PNEUMOC VAC/ADMIN/RCVD: CPT | Performed by: INTERNAL MEDICINE

## 2018-04-09 PROCEDURE — 3288F FALL RISK ASSESSMENT DOCD: CPT | Performed by: INTERNAL MEDICINE

## 2018-04-09 PROCEDURE — 99215 OFFICE O/P EST HI 40 MIN: CPT | Performed by: INTERNAL MEDICINE

## 2018-04-09 PROCEDURE — 1090F PRES/ABSN URINE INCON ASSESS: CPT | Performed by: INTERNAL MEDICINE

## 2018-04-09 RX ORDER — FUROSEMIDE 40 MG/1
40 TABLET ORAL 2 TIMES DAILY
Qty: 180 TABLET | Refills: 3 | Status: SHIPPED | OUTPATIENT
Start: 2018-04-09 | End: 2018-01-01 | Stop reason: SDUPTHER

## 2018-04-09 ASSESSMENT — PATIENT HEALTH QUESTIONNAIRE - PHQ9
SUM OF ALL RESPONSES TO PHQ QUESTIONS 1-9: 1
1. LITTLE INTEREST OR PLEASURE IN DOING THINGS: 0
2. FEELING DOWN, DEPRESSED OR HOPELESS: 1
SUM OF ALL RESPONSES TO PHQ9 QUESTIONS 1 & 2: 1

## 2018-04-16 ENCOUNTER — HOSPITAL ENCOUNTER (OUTPATIENT)
Dept: LAB | Age: 83
Setting detail: SPECIMEN
Discharge: HOME | End: 2018-04-16
Payer: MEDICARE

## 2018-04-16 ENCOUNTER — ANTI-COAG VISIT (OUTPATIENT)
Dept: INTERNAL MEDICINE | Age: 83
End: 2018-04-16

## 2018-04-16 DIAGNOSIS — I48.20 CHRONIC ATRIAL FIBRILLATION (HCC): ICD-10-CM

## 2018-04-16 DIAGNOSIS — I10 ESSENTIAL HYPERTENSION: ICD-10-CM

## 2018-04-16 LAB
ANION GAP SERPL CALCULATED.3IONS-SCNC: 11 MMOL/L (ref 9–17)
BUN BLDV-MCNC: 13 MG/DL (ref 8–23)
BUN/CREAT BLD: 16 (ref 9–20)
CALCIUM SERPL-MCNC: 9.4 MG/DL (ref 8.6–10.4)
CHLORIDE BLD-SCNC: 96 MMOL/L (ref 98–107)
CO2: 31 MMOL/L (ref 20–31)
CREAT SERPL-MCNC: 0.82 MG/DL (ref 0.5–0.9)
GFR AFRICAN AMERICAN: >60 ML/MIN
GFR NON-AFRICAN AMERICAN: >60 ML/MIN
GFR SERPL CREATININE-BSD FRML MDRD: ABNORMAL ML/MIN/{1.73_M2}
GFR SERPL CREATININE-BSD FRML MDRD: ABNORMAL ML/MIN/{1.73_M2}
GLUCOSE BLD-MCNC: 168 MG/DL (ref 70–99)
INR BLD: 2.2
POTASSIUM SERPL-SCNC: 4.2 MMOL/L (ref 3.7–5.3)
PROTHROMBIN TIME: 23 SEC (ref 9.4–11.3)
SODIUM BLD-SCNC: 138 MMOL/L (ref 135–144)

## 2018-04-16 PROCEDURE — 36415 COLL VENOUS BLD VENIPUNCTURE: CPT

## 2018-04-16 PROCEDURE — 80048 BASIC METABOLIC PNL TOTAL CA: CPT

## 2018-04-16 PROCEDURE — 85610 PROTHROMBIN TIME: CPT

## 2018-04-18 ENCOUNTER — OFFICE VISIT (OUTPATIENT)
Dept: OPTOMETRY | Age: 83
End: 2018-04-18
Payer: MEDICARE

## 2018-04-18 DIAGNOSIS — H43.392 VITREOUS FLOATERS OF LEFT EYE: ICD-10-CM

## 2018-04-18 DIAGNOSIS — H53.2 DIPLOPIA: ICD-10-CM

## 2018-04-18 DIAGNOSIS — H53.9 VISUAL DISTURBANCE OF ONE EYE: ICD-10-CM

## 2018-04-18 DIAGNOSIS — H35.81 MACULAR EDEMA: Primary | ICD-10-CM

## 2018-04-18 PROCEDURE — G8598 ASA/ANTIPLAT THER USED: HCPCS | Performed by: OPTOMETRIST

## 2018-04-18 PROCEDURE — 92134 CPTRZ OPH DX IMG PST SGM RTA: CPT | Performed by: OPTOMETRIST

## 2018-04-18 PROCEDURE — G8420 CALC BMI NORM PARAMETERS: HCPCS | Performed by: OPTOMETRIST

## 2018-04-18 PROCEDURE — G8427 DOCREV CUR MEDS BY ELIG CLIN: HCPCS | Performed by: OPTOMETRIST

## 2018-04-18 PROCEDURE — 1090F PRES/ABSN URINE INCON ASSESS: CPT | Performed by: OPTOMETRIST

## 2018-04-18 PROCEDURE — 1036F TOBACCO NON-USER: CPT | Performed by: OPTOMETRIST

## 2018-04-18 PROCEDURE — 99212 OFFICE O/P EST SF 10 MIN: CPT | Performed by: OPTOMETRIST

## 2018-04-18 PROCEDURE — 4040F PNEUMOC VAC/ADMIN/RCVD: CPT | Performed by: OPTOMETRIST

## 2018-04-18 PROCEDURE — 3288F FALL RISK ASSESSMENT DOCD: CPT | Performed by: OPTOMETRIST

## 2018-04-18 PROCEDURE — 1123F ACP DISCUSS/DSCN MKR DOCD: CPT | Performed by: OPTOMETRIST

## 2018-04-18 RX ORDER — PHENYLEPHRINE HCL 2.5 %
1 DROPS OPHTHALMIC (EYE) ONCE
Status: COMPLETED | OUTPATIENT
Start: 2018-04-18 | End: 2018-04-18

## 2018-04-18 RX ORDER — TROPICAMIDE 10 MG/ML
1 SOLUTION/ DROPS OPHTHALMIC ONCE
Status: COMPLETED | OUTPATIENT
Start: 2018-04-18 | End: 2018-04-18

## 2018-04-18 RX ADMIN — TROPICAMIDE 1 DROP: 10 SOLUTION/ DROPS OPHTHALMIC at 10:45

## 2018-04-18 RX ADMIN — Medication 1 DROP: at 10:45

## 2018-04-18 ASSESSMENT — VISUAL ACUITY
OD_CC: 20/25 OU
METHOD: SNELLEN - LINEAR
CORRECTION_TYPE: GLASSES
OS_CC: 20/25

## 2018-04-18 ASSESSMENT — SLIT LAMP EXAM - LIDS
COMMENTS: NORMAL
COMMENTS: NORMAL

## 2018-06-30 NOTE — PATIENT INSTRUCTIONS

## 2018-06-30 NOTE — PROGRESS NOTES
dr Naseem Ignacio  11/06/2017    replacement 11/06/17    CATARACT REMOVAL Bilateral     CATARACT REMOVAL WITH IMPLANT Bilateral Left 01/20/09, right 12/18/08    CHOLECYSTECTOMY      COLPORRHAPHY  1999    and posterior perineorrhaphy    CORONARY ARTERY BYPASS GRAFT  X 4   1991    CORONARY ARTERY BYPASS GRAFT  03/26/91    x4    HEMORRHOID SURGERY  1960s    procedure done    HYSTERECTOMY  1983    Total abdominal hysterectomy with bilateral salpingo-oophorectomy.  JOINT REPLACEMENT      bilat knee replacements    KNEE ARTHROPLASTY Left 11/07/02    left total knee    KNEE ARTHROPLASTY Right 04/07/04    right total knee Dr Orlando Sanches ARTHROSCOPY Left 07/17/02    PACEMAKER PLACEMENT  12/06    and revision of lead both done 12/06, ICD placed 2009, replace battery 04/13    PACEMAKER PLACEMENT  8/01/09    Defibrillator/biventricular CRT pacemaker.     UPPER GASTROINTESTINAL ENDOSCOPY  05/26/04    w/attempted heater probe to bleeding duodenal ulcer       Social History   Substance Use Topics    Smoking status: Never Smoker    Smokeless tobacco: Never Used    Alcohol use No      Current Outpatient Prescriptions   Medication Sig Dispense Refill    calcium carbonate-vitamin D (CALTRATE) 600-400 MG-UNIT TABS per tab Take 1 tablet by mouth daily      furosemide (LASIX) 40 MG tablet Take 1 tablet by mouth 2 times daily 180 tablet 3    simvastatin (ZOCOR) 80 MG tablet TAKE 1 TABLET NIGHTLY 90 tablet 3    nitroGLYCERIN (NITROSTAT) 0.4 MG SL tablet Place 1 tablet under the tongue as needed (Chest pain.) 25 tablet 3    potassium chloride (KLOR-CON M) 20 MEQ extended release tablet Take 20 mEq by mouth daily      omeprazole 20 MG EC tablet Take 20 mg by mouth daily       warfarin (COUMADIN) 3 MG tablet Take 1-1.5 tablets by mouth daily As directed per  tablet 3    methimazole (TAPAZOLE) 10 MG tablet TAKE 1/2 TABLET TWICE A DAY 90 tablet 3    ramipril (ALTACE) 10 MG capsule Eyes: Conjunctivae and EOM are normal. Pupils are equal, round, and reactive to light. Neck: Neck supple. Cardiovascular: Normal rate, regular rhythm and normal heart sounds. Pulmonary/Chest: Effort normal and breath sounds normal. No respiratory distress. She has no wheezes. She has no rales. Abdominal: Soft. Bowel sounds are normal. She exhibits no distension. There is no tenderness. Lymphadenopathy:     She has no cervical adenopathy. Neurological: She is alert and oriented to person, place, and time. Psychiatric: She has a normal mood and affect. Assessment:       Diagnosis Orders   1. Upper respiratory tract infection, unspecified type         Plan:      Supportive care discussed - Tylenol/Motrin, cough suppressants, warm salt water gargles, and increased fluids/rest.      Return if symptoms worsen or fail to improve in 3-4 days. Patient given educational materials - see patient instructions. Discussed use, benefit, and side effects of prescribed medications. All patient questions answered. Pt voiced understanding.        Electronically signed by Twin Canales DO on 7/1/2018 at 11:20 PM

## 2018-07-07 NOTE — PROGRESS NOTES
[facility]             [address] [facility], [address]                        Telephone [phone]             Fax [phone]       Aleida Donahue  7/17/1931  MRN:  [id number]  Date of visit:  7/7/18    Subjective:    Bridgette Tobias is a 80 y.o.  female who presents to [facility] today (7/7/18) for evaluation of:  Cough (ankles swollen ,seems to be having more trouble breathing)      She states that she was seen at Urgent Care on 6/30/3018 for cough and congestion. Symptomatic treatment was recommended with Claritin and Mucinex. Her daughter states that the patient did not take the Mucinex. She states that her symptoms have worsened over the past few days. She has felt feverish at times, but she has not checked her temperature. She admits to some shortness of breath. She states that her ankles have become more swollen in the past couple of days also.       Current medications are:  Current Outpatient Prescriptions   Medication Sig Dispense Refill    calcium carbonate-vitamin D (CALTRATE) 600-400 MG-UNIT TABS per tab Take 1 tablet by mouth daily      furosemide (LASIX) 40 MG tablet Take 1 tablet by mouth 2 times daily 180 tablet 3    simvastatin (ZOCOR) 80 MG tablet TAKE 1 TABLET NIGHTLY 90 tablet 3    nitroGLYCERIN (NITROSTAT) 0.4 MG SL tablet Place 1 tablet under the tongue as needed (Chest pain.) 25 tablet 3    potassium chloride (KLOR-CON M) 20 MEQ extended release tablet Take 20 mEq by mouth daily      omeprazole 20 MG EC tablet Take 20 mg by mouth daily       warfarin (COUMADIN) 3 MG tablet Take 1-1.5 tablets by mouth daily As directed per  tablet 3    methimazole (TAPAZOLE) 10 MG tablet TAKE 1/2 TABLET TWICE A DAY 90 tablet 3    ramipril (ALTACE) 10 MG capsule TAKE 1 CAPSULE DAILY 14 capsule 0    metoprolol tartrate (LOPRESSOR) 50 MG tablet TAKE 1 TABLET TWICE A  tablet 3    digoxin (LANOXIN) 125 MCG tablet Take 1 tablet by mouth daily 90 tablet 3    aspirin 81 MG tablet Take 81 mg by mouth daily      Multiple Vitamins-Minerals (MULTIVITAMIN PO) Take 1 tablet by mouth daily      acetaminophen (TYLENOL) 500 MG tablet Take 500 mg by mouth every 6 hours as needed.  calcium-vitamin D (OSCAL-500) 500-200 MG-UNIT per tablet Take 1 tablet by mouth daily        No current facility-administered medications for this visit. She is allergic to bee venom and morphine. She has the following problem list:  Patient Active Problem List   Diagnosis    Osteopenia    Osteoarthritis    Chronic venous insufficiency    Gout    Astigmatism of both eyes with presbyopia    Pseudophakia of both eyes    Hyperthyroidism    Chronic atrial fibrillation ([address]    Essential hypertension    Coronary artery disease involving coronary bypass graft of native heart without angina pectoris    Chronic kidney disease    Cardiomyopathy ([address]    Pulmonary HTN    Elevated LFTs    Mitral valve regurgitation    Controlled type 2 diabetes mellitus with diabetic peripheral angiopathy without gangrene, without long-term current use of insulin (HCC)    Mixed hyperlipidemia        She  reports that she has never smoked. She has never used smokeless tobacco.      Objective:    Vitals:    07/07/18 0938   BP: 120/80   Site: Left Arm   Position: Sitting   Cuff Size: Large Adult   Pulse: 72   Temp: 98 °F (36.7 °C)   TempSrc: Tympanic   SpO2: 94%   Weight: 151 lb (68.5 kg)   Height: 5' 5\" (1.651 m)      SpO2: 94 %       Body mass index is 25.13 kg/m². Well-nourished, well-developed elderly  female alert and cooperative. The tympanic membranes are clear bilaterally. Oropharynx has no erythema. There is no exudate. There is no tenderness over the frontal and maxillary sinuses bilaterally. Neck is supple, with no lymphadenopathy.   Chest is clear to auscultation, no wheezes, rales, or

## 2018-07-07 NOTE — PATIENT INSTRUCTIONS
Take one additional Lasix (Furosemide) for the next 2 days. Continue to weigh yourself daily, and record the weights. Call Dr. Alejandro Sanderson office on Monday if the swelling has not improved, or if your other symptoms have not improved.

## 2018-07-27 NOTE — PROGRESS NOTES
07/27/18  Aleida Gonzalez  7/17/1931      Chief Complaint  1. Swelling    2. Cardiomyopathy, unspecified type ([address]    3. Osteopenia, unspecified location    4. Chronic atrial fibrillation (HCC)    5. Chronic venous insufficiency    6. Essential hypertension    7. Stage 3 chronic kidney disease    8. Non-rheumatic mitral regurgitation        HPI:  80year-old patient of Dr Shannan Jiang with history of cardiomyopathy, A. fib, venous insufficiency, hypertension, chronic kidney disease, mitral regurgitation and osteopenia in for evaluation of swelling to bilateral lower extremities. Significantly worsening over the last 3-4 days. Was seen earlier in the month by urgent care and suggested to increase her Lasix at that point for 4 days. States that did nothing for her. Denies any shortness of breath. Able to walk into the office today without chest pain/shortness of breath. States she sits with her legs up for most of the day. Does wear her support hose throughout the day. Doesn't elaborate she does not follow a very balanced diet. States everything she eats salty. Denies any heart palpitations. Continues on anticoagulation as directed. Blood pressure has been well controlled. No lightheadedness or dizziness    Allergies   Allergen Reactions    Bee Venom Anaphylaxis    Morphine Other (See Comments)     Hallucinations       Past Medical History:   Diagnosis Date    Adrenal gland anomaly     con't. 5.)Repeat CT 09/09 stable with 2.3 cm adrenal mass on the left. This was compared to previous studies. 6.)No further followup planned.  Adrenal mass (HCC)     Hypodensity, 1.5cm, associated with left adrenal gland. Dexamethasone suppression test normal.  Plasma free metanephrines normal. Plan for repeat imaging in 6 to 12 months from initial scan. 1.) CT 03/06 stable,  2.)Left adrenal mass 2.4 cm 01/08.  3) CT scan with 2.4 cm left adrenal mass compatible with adenoma 04/08. 4.) CT scan of abdomen, stable adrenal mass at 2.1 cm 10/08.  Astigmatism of both eyes with presbyopia 7/22/2014    Atrial fibrillation ([facility] admit 07/06 and 08/06    1. )Ablation 12/06 with pacemaker placement. 2.)ICD placed 07/09    Bilateral pseudophakia     Cardiomyopathy ([facility]     EF 35% echo 2015,  Echo 8/16  mod-sev TR, Mod MR, EF 40%, mild decrease RVfxn    Carotid arterial disease (HCC)     no hemodynamically significant narrowing-declines follow up    Chronic atrial fibrillation ([facility] 9/22/2015    Chronic kidney disease 1/26/2016    Chronic venous insufficiency     Coronary artery disease involving coronary bypass graft of native heart without angina pectoris 01/26/2016    1.)Status post CABG, 1991, 2.)Previously following with Dr Bud Hanna, 3.)Stents in 1999 and Nov 2002, 4.)Cardiac catheterization 05/09 with recommendation for medical tx Dr Yung Rosa, 5.)Non -Q wave myocardial infarction 86/63, complicating knee replacement surgery, 6.)Non q-myocardial infarction, 06/04 [facility]. 7.) Repeat [place], 04/08,large lateral MI, anteroapical MI, EF  43% . Echo 2/15 35% EF mod MR    Diabetes mellitus with peripheral vascular disease ([facility]     diet controlled' checks BS @ home every other day    Diverticulosis of intestine     History of    Dyslipidemia 1/26/2016    Elevated LFTs 2/8/2018    GERD (gastroesophageal reflux disease)     Gout     Hepatomegaly     secondary to fatty liver infiltrate on CT scan, 06/05 ,1.) Repeat CT 03/06, stable. 2.)Liver decreased slightly in size on CT, 01/08.  3.)Increased LFTs.  4.)Hepatitis B and C and hemochromatosis testing unremarkable 11/09    Hernia     ventral    Hyperlipidemia     Hypertension     Hyperthyroidism     declines scan/ endo    Hyponatremia     question related to diuretic, question SIADH, stable.     Hypothyroidism     in past now hyperthyroid 2015    Leukopenia     mild with a WBC of 2.5  03/08    Mitral valve regurgitation     Echocardiogram 04/12, ejection fraction 40-45%, moderate MR and LAE. Echo 4/14 EF 40-45%, mod-sev MR Mod-sevTR  Repeat Echo 2/15 EF 35%, Mod MR, Mod TR RSVP 51    Osteoarthritis     Osteopenia     on DEXA, 05/02, T -0.7 spine, T -1.2 hip. 1.)Repeat DEXA 11/04 T-1.8 spine, T-2.0 hip. 2.)DEXA scan, 12/05 T-1.9 spine, T-1.8 hip,  3. )DEXA , T-2.2 spine, T -1.5 hip 01/08    Posterior vitreous detachment     bilateral     Presbyopia     Pulmonary HTN     Echo 8/16 with RVSP 38, mild decrease RV fxn    Upper gastrointestinal bleed     with multiple gastric and duodenal ulcers, 05/04, EGD Dr Nicole Mills       Past Surgical History:   Procedure Laterality Date    APPENDECTOMY  1948    CAPSULOTOMY Left 12/11/12    YAG    CARDIAC DEFIBRILLATOR PLACEMENT  4-    replacement [facility] with dr Lisset Ring  11/06/2017    replacement 11/06/17    CATARACT REMOVAL Bilateral     CATARACT REMOVAL WITH IMPLANT Bilateral Left 01/20/09, right 12/18/08    CHOLECYSTECTOMY      COLPORRHAPHY  1999    and posterior perineorrhaphy    CORONARY ARTERY BYPASS GRAFT  X 4   1991    CORONARY ARTERY BYPASS GRAFT  03/26/91    x4    HEMORRHOID SURGERY  1960s    procedure done    HYSTERECTOMY  1983    Total abdominal hysterectomy with bilateral salpingo-oophorectomy.  JOINT REPLACEMENT      bilat knee replacements    KNEE ARTHROPLASTY Left 11/07/02    left total knee    KNEE ARTHROPLASTY Right 04/07/04    right total knee Dr Gamboa Doroteo ARTHROSCOPY Left 07/17/02    PACEMAKER PLACEMENT  12/06    and revision of lead both done 12/06, ICD placed 2009, replace battery 04/13    PACEMAKER PLACEMENT  8/01/09    Defibrillator/biventricular CRT pacemaker.     UPPER GASTROINTESTINAL ENDOSCOPY  05/26/04    w/attempted heater probe to bleeding duodenal ulcer       Current Outpatient Prescriptions on File Prior to Visit   Medication Sig Dispense Refill    digoxin (LANOXIN) 125 MCG tablet Take 1 tablet by mouth daily 90 tablet 0    calcium carbonate-vitamin D (CALTRATE) 600-400 MG-UNIT TABS per tab Take 1 tablet by mouth daily      furosemide (LASIX) 40 MG tablet Take 1 tablet by mouth 2 times daily 180 tablet 3    simvastatin (ZOCOR) 80 MG tablet TAKE 1 TABLET NIGHTLY 90 tablet 3    nitroGLYCERIN (NITROSTAT) 0.4 MG SL tablet Place 1 tablet under the tongue as needed (Chest pain.) 25 tablet 3    potassium chloride (KLOR-CON M) 20 MEQ extended release tablet Take 20 mEq by mouth daily      omeprazole 20 MG EC tablet Take 20 mg by mouth daily       warfarin (COUMADIN) 3 MG tablet Take 1-1.5 tablets by mouth daily As directed per  tablet 3    ramipril (ALTACE) 10 MG capsule TAKE 1 CAPSULE DAILY 14 capsule 0    metoprolol tartrate (LOPRESSOR) 50 MG tablet TAKE 1 TABLET TWICE A  tablet 3    aspirin 81 MG tablet Take 81 mg by mouth daily      Multiple Vitamins-Minerals (MULTIVITAMIN PO) Take 1 tablet by mouth daily      acetaminophen (TYLENOL) 500 MG tablet Take 500 mg by mouth every 6 hours as needed. No current facility-administered medications on file prior to visit. Social History     Social History    Marital status:      Spouse name: N/A    Number of children: 4    Years of education: N/A     Occupational History    Not on file. Social History Main Topics    Smoking status: Never Smoker    Smokeless tobacco: Never Used    Alcohol use No    Drug use: Unknown    Sexual activity: Not on file     Other Topics Concern    Not on file     Social History Narrative    No narrative on file       Review of Systems   Constitutional: Negative for chills, fever and malaise/fatigue. Respiratory: Negative for cough, sputum production, shortness of breath and wheezing. Cardiovascular: Positive for leg swelling. Negative for chest pain, palpitations, orthopnea and PND. Gastrointestinal: Negative for abdominal pain, blood in stool, constipation, diarrhea, heartburn, nausea and vomiting.    Genitourinary: Negative for dysuria

## 2018-08-01 NOTE — PATIENT INSTRUCTIONS
If you need to reschedule your Echocardiogram please call  [phone] and push option 1. If you have any questions or concerns, call Cardiology at [phone]. The Cardiopulmonary Testing Facility is located in the basement of [facility]. Please check in for your testing appointment at the [facility] Physical Therapy desk.

## 2018-08-01 NOTE — TELEPHONE ENCOUNTER
LM for daughter Eboni that lab from today was ordered by Velma Mcgovern, so I recommend checking with her office. No changes noted from cardio. I asked her to call back with any additional concerns or questions.

## 2018-08-01 NOTE — PROGRESS NOTES
to severe tricuspid regurgitation. RVSP is 39 mm Hg. 8.   No pericardial effusion. 9.   ICD/pacemaker wire noted in the right heart chambers. Assessment / Acute Cardiac Problems:     1-CAD: S/P CABG*4: Last cardiac cath 2009 patent LIMA-LAD and SVG-RPDA. Occluded grafts to OM1 and OM2 and medical treatment was advised:Stable with no signs or symptoms of ischemia  2-HTN: well controlled. 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 5-S/P ICD LV lead is dislodged and currently off. Working as regular ICD not BI V ICD: with change out and LV lead was capped 11/2017. 6-Chronic atrial fibrillation: on anticoagulation. 7-Moderate MR.  8-Moderate TR with moderate PHTN  9-Thyroid disease. Plan of Treatment:     1-Continue current medical treatment with ASA, BB, ACEI, digoxin, diuretics, Coumadin and statin. Continue with Lasix at 60 mg BID, her BMP today remains stable. 2-Check 2 D echo to re-evaluate TR and MR in addition to EF. 3-Diet, exercise and loose weight. 4-Regular ICD follow up every 6 months. 5-F/U in 6 months.     Electronically signed by Luis Boo MD on 8/1/2018 at 9:53 [facility]  [phone]

## 2018-08-02 NOTE — TELEPHONE ENCOUNTER
Eboni, daughter called. States when she saw you earlier this week you increased her lasix to 60mg in morning and p.m. Cardiologist office told her since our office increased it she should check with you as to whether or not she is to continue that dose or go back to previous dose?   Call Eboni at [phone]

## 2018-08-03 NOTE — TELEPHONE ENCOUNTER
Patient saw Ramin Nino  Earlier in the week. Increased lasix to 60mg. She saw Cardio on 8-1-18 . Yesterday daughter stated she was unclear as to whether or not mother should continue lasix 60 as cardio told them to check with Ramin Nino. Today, son Natalya Odell, called and states that cardio told them to continue 60 mg and our office told them 60 mg was temporary. Daughter stated edema was better. Now family is asking , should she be taking 60 or 40 mg?   Call Natalya Odell at [phone]

## 2018-08-06 NOTE — TELEPHONE ENCOUNTER
Pt son called back and explained findings on echo. Son stated that his  Mother had dementia and there was no real way to gauge how she was feeling but that they would keep an eye on her. Further explained that Dr Nubia Gillis would read echo results Wednesday again and we would check back with them.

## 2018-08-08 NOTE — TELEPHONE ENCOUNTER
Notified patient daughter Hi Butts of ECHO results. Instructed patient to keep upcoming appointment on 10/24/18 with cardiologist and to call our office for any questions. Patient also instructed to utilize the E.D. for any emergent health issues that may arise. Eobni voices understandment and will phone office for any further needs.

## 2018-08-15 NOTE — PROGRESS NOTES
Left message for pt and/or daughter, Eboni to return call. Bk Tafoya, CNP on-call notified of critical INR.

## 2018-09-19 NOTE — PATIENT INSTRUCTIONS
Patient Education        Sacral Fracture: Care Instructions  Your Care Instructions    A sacral fracture occurs when a bone called the sacrum breaks. The sacrum is a large triangular bone at the bottom of the spine. It fits like a wedge between the two hipbones. The sacrum is made up of the sacral vertebrae, which are fused together. Sometimes the coccyx, or tailbone, is fractured along with the sacrum. The tailbone is the small bone at the bottom of the spine. It is attached to the sacral bone above it. In some cases, an injury to the sacrum can affect the nerves that control the bladder, bowel, or legs. Home treatment may be all that is needed for some sacral fractures. If a fracture is severe or affects nerves, you may need surgery. You heal best when you take good care of yourself. Eat a variety of healthy foods, and don't smoke. Follow-up care is a key part of your treatment and safety. Be sure to make and go to all appointments, and call your doctor if you are having problems. It's also a good idea to know your test results and keep a list of the medicines you take. How can you care for yourself at home? · Follow your doctor's instructions for bed rest and activity. · Take pain medicines exactly as directed. ¨ If the doctor gave you a prescription medicine for pain, take it as prescribed. ¨ If you are not taking a prescription pain medicine, ask your doctor if you can take an over-the-counter medicine. · Put ice or a cold pack on the painful area for 10 to 20 minutes at a time. Try to do this every 1 to 2 hours for the next 3 days (when you are awake). Put a thin cloth between the ice and your skin or brace. · Do not sit on hard, unpadded surfaces. Sit on a doughnut-shaped pillow to take pressure off the tailbone area. · Put only as much weight on each leg as your doctor tells you to. He or she may advise you to use crutches, a walker, or a cane to help you walk.   · Avoid constipation, because straining to have a bowel movement will increase your pain. ¨ Include fruits, vegetables, beans, and whole grains in your diet each day. These foods are high in fiber. ¨ Drink plenty of fluids, enough so that your urine is light yellow or clear like water. If you have kidney, heart, or liver disease and have to limit fluids, talk with your doctor before you increase your fluid intake. When should you call for help? Call 911 anytime you think you may need emergency care. For example, call if:    · You are unable to move a leg at all.   [facility] your doctor now or seek immediate medical care if:    · You have new or worse symptoms in your legs or buttocks. Symptoms may include:  ¨ Numbness or tingling. ¨ Weakness. ¨ Pain.     · You lose bladder or bowel control.    Watch closely for changes in your health, and be sure to contact your doctor if:    · You are not getting better as expected. Where can you learn more? Go to https://[facility].[employer]. org and sign in to your [employer] account. Enter [phone] in the [employer] box to learn more about \"Sacral Fracture: Care Instructions. \"     If you do not have an account, please click on the \"Sign Up Now\" link. Current as of: November 29, 2017  Content Version: 11.7  © 8507-5643 [employer]. Care instructions adapted under license by [facility] [employer] [facility] ([facility]). If you have questions about a medical condition or this instruction, always ask your healthcare professional. [place] [address] any warranty or liability for your use of this information.

## 2018-09-19 NOTE — PROGRESS NOTES
motion. Neck supple. Cardiovascular: Normal rate, regular rhythm and normal heart sounds. Pulmonary/Chest: Effort normal and breath sounds normal.   Musculoskeletal:        Lumbar back: She exhibits decreased range of motion and pain. She exhibits no swelling. Back:    Neurological: She is alert and oriented to person, place, and time. Skin: Skin is warm and dry. Psychiatric: She has a normal mood and affect. Her behavior is normal. Thought content normal.   Nursing note and vitals reviewed. Assessment and Plan:    Xr Sacrum Coccyx (min 2 Views)    Result Date: 9/19/2018  EXAMINATION: 3 XRAY VIEWS OF THE SACRUM/COCCYX 9/19/2018 12:40 pm COMPARISON: None. HISTORY: ORDERING SYSTEM PROVIDED HISTORY: Pain in the coccyx TECHNOLOGIST PROVIDED HISTORY: fell on Sunday, [facility] pain Ordering Physician Provided Reason for Exam: Fall 4 days ago, landed on sacrum. Acuity: Acute Type of Exam: Initial FINDINGS: There is an oblique lucency through the 4th sacral segment possibly representing a minimally displaced fracture. There is some superficial soft tissue swelling. Osteopenic skeleton. Facet degenerative changes and degenerative disc disease lower lumbar spine. Possible minimally displaced acute lower sacral fracture with some associated soft tissue swelling. Diagnosis Orders   1. Closed fracture of sacrum, unspecified portion of sacrum, initial encounter [facility])  Teresa Solano MD, Orthopedics [place]   2. Pain in the coccyx  XR SACRUM COCCYX (MIN 2 VIEWS)     Take Tylenol as needed for pain. Rest the affected painful area. Use pillow or donut while sitting. Apply cold compresses intermittently as needed. As pain recedes, begin normal activities slowly as tolerated. Follow up with orthopedic or PCP if symptoms do not improve or worsen.         Electronically signed by REED Ashley CNP on 9/19/18 at 1:21 PM

## 2018-09-21 NOTE — PROGRESS NOTES
Subjective:      Patient ID: Jojo Patel is a 80 y.o. female. Back Pain   This is a new problem. The current episode started in the past 7 days. The problem occurs constantly. The problem is unchanged. The pain is present in the gluteal. The quality of the pain is described as aching. The pain does not radiate. The pain is moderate. The pain is worse during the day. The symptoms are aggravated by sitting. Stiffness is present all day. Risk factors include history of osteoporosis. The treatment provided mild relief. Patient with a ground-level fall earlier this week pain in the coccyx region    Patient is been taking some extra strength Tylenol which seems to be acceptably controlling her pain    Review of Systems   Musculoskeletal: Positive for back pain. All other systems reviewed and are negative. Objective:   Physical Exam   Constitutional: She is oriented to person, place, and time. She appears well-developed and well-nourished. HENT:   Head: Normocephalic and atraumatic. Eyes: Conjunctivae and EOM are normal.   Neck: Normal range of motion. Pulmonary/Chest: Effort normal. No respiratory distress. Neurological: She is alert and oriented to person, place, and time. She has normal strength. No sensory deficit. Normal gait   Skin: Skin is warm and dry. Psychiatric: Her behavior is normal. Thought content normal.   Nursing note and vitals reviewed. Patient denies pain in the sacral region but specifically notes pain in the coccyx region deep in the gluteal cleft    Diagnostic x-rays pelvis and sacrum reviewed patient with slightly increased angulation to the mid sacrum no obvious fractures  Assessment:      Encounter Diagnosis   Name Primary?     Coccydynia Yes           Plan:      Follow-up 4 weeks        Rolando Ny MD

## 2018-09-24 NOTE — PROGRESS NOTES
living. Otherwise she seems to be doing fairly well and denies any other complaints. She would like to get her flu shot today. Screenings for behavioral, psychosocial and functional/safety risks, and cognitive dysfunction are all negative except as indicated below. These results, as well as other patient data from the [address] form, are documented in Flowsheets linked to this Encounter. ALLERGIES:  Allergies   Allergen Reactions    Bee Venom Anaphylaxis    Morphine Other (See Comments)     Hallucinations       MEDICATIONS:   Outpatient Prescriptions Marked as Taking for the 9/24/18 encounter (Office Visit) with Jane Hussein, DO   Medication Sig Dispense Refill    metoprolol tartrate (LOPRESSOR) 50 MG tablet TAKE 1 TABLET TWICE A  tablet 3    ramipril (ALTACE) 10 MG capsule TAKE 1 CAPSULE DAILY 90 capsule 3    methimazole (TAPAZOLE) 5 MG tablet Take 1 tablet by mouth daily 90 tablet 3    furosemide (LASIX) 40 MG tablet Take 1.5 tablets by mouth 2 times daily 180 tablet 3    Furosemide (LASIX PO) Take 60 mg by mouth 2 times daily      digoxin (LANOXIN) 125 MCG tablet Take 1 tablet by mouth daily 90 tablet 0    calcium carbonate-vitamin D (CALTRATE) 600-400 MG-UNIT TABS per tab Take 1 tablet by mouth daily      simvastatin (ZOCOR) 80 MG tablet TAKE 1 TABLET NIGHTLY 90 tablet 3    nitroGLYCERIN (NITROSTAT) 0.4 MG SL tablet Place 1 tablet under the tongue as needed (Chest pain.) 25 tablet 3    potassium chloride (KLOR-CON M) 20 MEQ extended release tablet Take 20 mEq by mouth daily      omeprazole 20 MG EC tablet Take 20 mg by mouth daily       warfarin (COUMADIN) 3 MG tablet Take 1-1.5 tablets by mouth daily As directed per  tablet 3    aspirin 81 MG tablet Take 81 mg by mouth daily      Multiple Vitamins-Minerals (MULTIVITAMIN PO) Take 1 tablet by mouth daily      acetaminophen (TYLENOL) 500 MG tablet Take 500 mg by mouth every 6 hours as needed. Past Medical History:   Diagnosis Date    Adrenal gland anomaly     con't. 5.)Repeat CT 09/09 stable with 2.3 cm adrenal mass on the left. This was compared to previous studies. 6.)No further followup planned.  Adrenal mass (HCC)     Hypodensity, 1.5cm, associated with left adrenal gland. Dexamethasone suppression test normal.  Plasma free metanephrines normal. Plan for repeat imaging in 6 to 12 months from initial scan. 1.) CT 03/06 stable,  2.)Left adrenal mass 2.4 cm 01/08. 3) CT scan with 2.4 cm left adrenal mass compatible with adenoma 04/08. 4.) CT scan of abdomen, stable adrenal mass at 2.1 cm 10/08.  Astigmatism of both eyes with presbyopia 7/22/2014    Atrial fibrillation ([address] admit 07/06 and 08/06    1. )Ablation 12/06 with pacemaker placement. 2.)ICD placed 07/09    Bilateral pseudophakia     Cardiomyopathy ([address]     EF 35% echo 2015,  Echo 8/16  mod-sev TR, Mod MR, EF 40%, mild decrease RVfxn    Carotid arterial disease (HCC)     no hemodynamically significant narrowing-declines follow up    Chronic atrial fibrillation ([address] 9/22/2015    Chronic kidney disease 1/26/2016    Chronic venous insufficiency     Coronary artery disease involving coronary bypass graft of native heart without angina pectoris 01/26/2016    1.)Status post CABG, 1991, 2.)Previously following with Dr Bill Owen, 3.)Stents in 1999 and Nov 2002, 4.)Cardiac catheterization 05/09 with recommendation for medical tx Dr Vira Osorio, 5.)Non -Q wave myocardial infarction 40/10, complicating knee replacement surgery, 6.)Non q-myocardial infarction, 06/04 [facility]. 7.) Repeat [place], 04/08,large lateral MI, anteroapical MI, EF  43% . Echo 2/15 35% EF mod MR    Diabetes mellitus with peripheral vascular disease ([address]     diet controlled' checks BS @ home every other day    Diverticulosis of intestine     History of    Dyslipidemia 1/26/2016    Elevated LFTs 2/8/2018    GERD (gastroesophageal reflux disease)     Gout     left total knee    KNEE ARTHROPLASTY Right 04    right total knee Dr Elisha Jimenez ARTHROSCOPY Left 02    PACEMAKER PLACEMENT      and revision of lead both done , ICD placed , replace battery     PACEMAKER PLACEMENT  09    Defibrillator/biventricular CRT pacemaker.  UPPER GASTROINTESTINAL ENDOSCOPY  04    w/attempted heater probe to bleeding duodenal ulcer       Family History   Problem Relation Age of Onset    Cancer Father         unknown type    Heart Disease Father     Asthma Father     Heart Disease Brother          of a heart attack at age 61.     Other Mother         ruptured appendix    Diabetes Neg Hx     Colon Cancer Neg Hx     Breast Cancer Neg Hx     Glaucoma Neg Hx     Cataracts Neg Hx          CareTeam (Including outside providers/suppliers regularly involved in providing care):   Patient Care Team:  Justa Wall DO as PCP - General (Internal Medicine)  Wse Mendieta MD as PCP - S Attributed Provider  January Coelho MD as Consulting Physician (Cardiology)    REVIEW OF SYSTEMS:     General: negative for - chills, fever or night sweats  Skin: negative for - pruritus or rash  Head: Negative for: headache or recent history of head trauma  Ear, Nose, Throat: negative for - epistaxis, nasal congestion, nasal discharge, sore throat, tinnitus or vertigo  Cardiovascular: negative for - chest pain, dyspnea on exertion or shortness of breath  Respiratory: negative for - cough, hemoptysis or wheezing  Gastrointestinal: negative for - constipation, diarrhea or nausea/vomiting  Genitourinary: negative for - dysuria, hematuria or nocturia  Musculoskeletal: positive for - joint pain  negative for - muscle pain or muscular weakness  Neurologic: negative for - gait disturbance, numbness/tingling, seizures or tremors  Psychiatric: negative for - anxiety or depression     PHYSICAL EXAMINATION:    Wt Readings from Last 3 Encounters:   18 140 lb 3.2 oz (63.6 kg)   09/21/18 136 lb (61.7 kg)   09/19/18 136 lb (61.7 kg)       Body mass index is 23.19 kg/m². Vitals:    09/24/18 1409   BP: 110/60   Site: Right Upper Arm   Position: Sitting   Cuff Size: Medium Adult   Pulse: 60   Resp: 16   Weight: 140 lb 3.2 oz (63.6 kg)   Height: 5' 5.2\" (1.656 m)     Body mass index is 23.19 kg/m². General: This is a 80 y.o.  female who is alert and oriented to person, place and time. She appears to be her stated age and does not appear to be in any acute distress. Skin: Skin color, texture, turgor normal. No rashes or lesions. HEENT/Neck: Head: Normal, normocephalic, atraumatic. Eye: Normal external eye, conjunctiva, lids cornea, JACQUELIN. Ears: Normal TM's bilaterally. Normal auditory canals and external ears. Non-tender. Nose: Normal external nose, mucus membranes and septum. Pharynx: Dental Hygiene adequate. Normal buccal mucosa. Normal pharynx. Neck / Thyroid: Supple, no masses, nodes, nodules or enlargement. Chest: Rises and falls symmetrically. No use of accessory muscles. No retractions. Lungs: Normal - CTA without rales, rhonchi, or wheezing. Heart: regular rate and rhythm, S1, S2 normal, no murmur, click, rub or gallop No S3 or S4. Abdomen: Non-obese soft, non-distended, non-tender, normal active bowel sounds, no masses palpated and no hepatosplenomegaly  Extremities: Strength is 5/5 bilaterally. Radial pulses are +2/4 bilaterally. Dorsalis pedis pulses are +2/4 bilaterally. There is no clubbing or cyanosis in any of the extremities but there was a grade 3 - 4+ and pitting edema bilaterally in the lower extremities. Neurologic: Deep tendon reflexes are 2+/4+ bilaterally. cranial nerves II-XII are grossly intact  Osteopathic Structural Examination: Patient was examined in the seated and standing positions. There was full range of motion in the cervical, thoracic, and lumbar spines. No scoliosis.  No change in thoracic kyphosis or lumbar lordosis. No increased paravertebral muscle tenderness. QUESTIONNAIRE REVIEW:    The following problems were reviewed today and where indicated follow up appointments were made and/or referrals ordered. Positive Risk Factor Screenings with Interventions:     Fall Risk:  Timed Up and Go Test > 12 seconds?: no  2 or more falls in past year?: (!) yes  Fall with injury in past year?: (!) yes  Fall Risk Assessment[de-identified] (!) Positive Screening for Falls  Fall Risk Interventions:    · Home safety tips provided  · Already seeing orthopedics for coccygeal fracture    Cognitive: Words recalled: 0 Words Recalled  Clock Drawing Test (CDT) Score: Normal  Total Score Interpretation: Positive Mini-Cog  Did the patient refuse to take the cognition test?: No  Cognitive Impairment Interventions:  · Patient declines any further evaluation/treatment for cognitive impairment  · Son states process of getting her into assisted living has begun    Depression:  PHQ-2 Score: 4  PHQ-9 Total Score: 10  Depression Screening Interpretation: (!) PHQ-9 Score 10-14:  Moderate Depression  Depression Interventions:  · Patient declines any further evaluation/treatment for this issue  · Son states family does not believe there is any depression    General Health:  General  In general, how would you say your health is?: (!) Poor  In the past 7 days, have you experienced any of the following?: (!) Loneliness, Social Isolation, Stress  Do you get the social and emotional support that you need?: (!) No  Do you have a Living Will?: (!) No  General Health Risk Interventions:  · Loneliness: Son states they have started process of getting her in to assisted living  · Social isolation: Son states they have started process of getting her in to assisted living  · Stress: Son states they have started process of getting her in to assisted living  · No Living Will: additional information provided    Health Habits/Nutrition:  Health Habits/Nutrition  Do you signed by Blake Cowart DO on 9/24/2018 at 3:28 PM  Internal Medicine

## 2018-09-24 NOTE — PATIENT INSTRUCTIONS
Personalized Preventive Plan for Aleida Pitt - 9/24/2018  Medicare offers a range of preventive health benefits. Some of the tests and screenings are paid in full while other may be subject to a deductible, co-insurance, and/or copay. Some of these benefits include a comprehensive review of your medical history including lifestyle, illnesses that may run in your family, and various assessments and screenings as appropriate. After reviewing your medical record and screening and assessments performed today your provider may have ordered immunizations, labs, imaging, and/or referrals for you. A list of these orders (if applicable) as well as your Preventive Care list are included within your After Visit Summary for your review. Other Preventive Recommendations:    · A preventive eye exam performed by an eye specialist is recommended every 1-2 years to screen for glaucoma; cataracts, macular degeneration, and other eye disorders. · A preventive dental visit is recommended every 6 months. · Try to get at least 150 minutes of exercise per week or 10,000 steps per day on a pedometer . · Order or download the FREE \"Exercise & Physical Activity: Your Everyday Guide\" from [employer]. Call 2-234.767.6936 or search [employer] online. · You need 7075-7240 mg of calcium and 6081-7580 IU of vitamin D per day. It is possible to meet your calcium requirement with diet alone, but a vitamin D supplement is usually necessary to meet this goal.  · When exposed to the sun, use a sunscreen that protects against both UVA and UVB radiation with an SPF of 30 or greater. Reapply every 2 to 3 hours or after sweating, drying off with a towel, or swimming. · Always wear a seat belt when traveling in a car. Always wear a helmet when riding a bicycle or motorcycle.   Patient Education        Learning About Diabetes Food Guidelines  Your Care Instructions    Meal planning is important to of foods. Put non-starchy vegetables on half the plate, meat or other protein food on one-quarter of the plate, and a grain or starchy vegetable in the final quarter of the plate. To this you can add a small piece of fruit and 1 cup of milk or yogurt, depending on how many carbs you are supposed to eat at a meal.  Try to eat about the same amount of carbs at each meal. Do not \"save up\" your daily allowance of carbs to eat at one meal.  Proteins have very little or no carbs per serving. Examples of proteins are beef, chicken, turkey, fish, eggs, tofu, cheese, cottage cheese, and peanut butter. A serving size of meat is 3 ounces, which is about the size of a deck of cards. Examples of meat substitute serving sizes (equal to 1 ounce of meat) are 1/4 cup of cottage cheese, 1 egg, 1 tablespoon of peanut butter, and ½ cup of tofu. How can you eat out and still eat healthy? Learn to estimate the serving sizes of foods that have carbohydrate. If you measure food at home, it will be easier to estimate the amount in a serving of restaurant food. If the meal you order has too much carbohydrate (such as potatoes, corn, or baked beans), ask to have a low-carbohydrate food instead. Ask for a salad or green vegetables. If you use insulin, check your blood sugar before and after eating out to help you plan how much to eat in the future. If you eat more carbohydrate at a meal than you had planned, take a walk or do other exercise. This will help lower your blood sugar. What else should you know? Limit saturated fat, such as the fat from meat and dairy products. This is a healthy choice because people who have diabetes are at higher risk of heart disease. So choose lean cuts of meat and nonfat or low-fat dairy products. Use olive or canola oil instead of butter or shortening when cooking. Don't skip meals. Your blood sugar may drop too low if you skip meals and take insulin or certain medicines for diabetes.   Check with your make you feel stronger and happier. It can help you relax and sleep better, and give you confidence in other things you do. How can you exercise safely? Before you start a new exercise program, talk to your doctor about how and when to exercise. You may need to have a medical exam and tests before you begin. Some types of exercise can be harmful if your diabetes is causing other problems, such as problems with your feet. Your doctor can tell you what types of exercise are good choices for you. These tips can help you exercise safely when you have diabetes. If your diabetes is controlled by diet or medicine that doesn't lower your blood sugar, you don't need to eat a snack before you exercise. Check your blood sugar before you exercise. And be careful about what you eat. If your blood sugar is less than 100, eat a carbohydrate snack before you exercise. Be careful when you exercise if your blood sugar is over 300. High blood sugar can make you dehydrated. And that makes your blood sugar levels go even higher. If you have ketones in your blood or urine and your blood sugar is over 300, do not exercise. Don't try to do too much at first. Build up your exercise program bit by bit. Try to get at least 30 minutes of exercise on most days of the week. Walking is a good choice. You also may want to do other activities, such as riding a bike or swimming. You might try running or gardening. Try to include muscle-strengthening exercises at least 2 times a week. These exercises include push-ups and weight training. You can also use rubber tubing or stretch bands. You stretch or pull the tubing or band to build muscle strength. If you want to exercise more, slowly increase how hard or long you exercise. You may get symptoms of low blood sugar during exercise or up to 24 hours later.  Some symptoms of low blood sugar, such as sweating, a fast heartbeat, or feeling tired, can be confused with what can happen anytime you exercise. Other symptoms may include feeling anxious, dizzy, weak, or shaky. So it's a good idea to check your blood sugar again. You can treat low blood sugar by eating or drinking something that has 15 grams of carbohydrate. These should be quick-sugar foods. Tasha Vera foods such as fruit juice, regular (not diet) soda, glucose tablets, hard candy, or raisins can help raise blood sugar. Check your blood sugar level again 15 minutes after having a quick-sugar food to make sure your level is getting back to your target range. Drink plenty of water before, during, and after you exercise. Wear medical alert jewelry that says you have diabetes. You can buy this at most drugstores. Pay attention to your body. If you are used to exercise and notice that you can't do as much as usual, talk to your doctor. Follow-up care is a key part of your treatment and safety. Be sure to make and go to all appointments, and call your doctor if you are having problems. It's also a good idea to know your test results and keep a list of the medicines you take. Where can you learn more? Go to https://Microelectronics Assembly TechnologiespepicewInflaRx.Finding Something 3. org and sign in to your [employer]os [employer] account. Enter D751 in the Zelgor box to learn more about \"Learning About Diabetes and Exercise. \"     If you do not have an account, please click on the \"Sign Up Now\" link. Current as of: December 7, 2017  Content Version: 11.7  © 6660-5325 [employer]. Care instructions adapted under license by [facility] ([facility]). If you have questions about a medical condition or this instruction, always ask your healthcare professional. [place] [address] any warranty or liability for your use of this information. Patient Education        Learning About Diabetes and Coronary Artery Disease  How are diabetes and heart disease connected? Many people think diabetes and heart disease go hand in hand.  But having diabetes doesn't have to mean blood pressure. Eat heart-healthy foods. These include fruits, vegetables, whole grains, fish, and low-fat or nonfat dairy foods. Limit sodium, alcohol, and sweets. If your doctor recommends it, get more exercise. Walking is a good choice. Bit by bit, increase the amount you walk every day. Try for at least 30 minutes on most days of the week. Do not smoke. Smoking can make diabetes and heart disease worse. If you need help quitting, talk to your doctor about stop-smoking programs and medicines. These can increase your chances of quitting for good. Your doctor may talk with you about taking medicines for your heart. For example, your doctor may suggest taking a statin or daily aspirin. Where can you learn more? Go to https://Bee TherepeRiverchase Dermatology and Cosmetic Surgeryeweb.Veros Systems. org and sign in to your [employer] account. Enter P760 in the Gentis box to learn more about \"Learning About Diabetes and Coronary Artery Disease. \"     If you do not have an account, please click on the \"Sign Up Now\" link. Current as of: December 7, 2017  Content Version: 11.7  © 9869-8995 [employer]. Care instructions adapted under license by [facility] ([facility]). If you have questions about a medical condition or this instruction, always ask your healthcare professional. [address] any warranty or liability for your use of this information. Patient Education        Diabetes and Preventing Falls: Care Instructions  Your Care Instructions    If you are an older adult who has diabetes, you may have a higher risk of falling. Complications of diabetes-such as nerve damage, foot problems, and reduced vision-may increase your risk of a fall. Some of your medicines also may add to your risk. By making your home safer, you can lower your risk of falling. Doing things to prevent diabetes complications may also help to lower your risk. You can make your home safer with a few simple measures.   Follow-up care is a key or reach high. Or use a reaching device that you can get at a medical supply store. If you have to climb for something, use a step stool with handrails, or ask someone to get it for you. Keep a cordless phone and a flashlight with new batteries by your bed. If possible, put a phone in each of the main rooms of your house, or carry a cell phone in case you fall and cannot reach a phone. Or you can wear a device around your neck or wrist. You push a button that sends a signal for help. Wear low-heeled shoes that fit well and give your feet good support. Use footwear with nonskid soles. Check the heels and soles of your shoes for wear. Repair or replace worn heels or soles. Do not wear socks without shoes on wood floors. Walk on the grass when the sidewalks are slippery. If you live in an area that gets snow and ice in the winter, sprinkle salt on slippery steps and sidewalks. Where can you learn more? Go to https://Silverback Systems.Polarizonics. org and sign in to your Genasys account. Enter Y902 in the NatureBridge box to learn more about \"Diabetes and Preventing Falls: Care Instructions. \"     If you do not have an account, please click on the \"Sign Up Now\" link. Current as of: May 12, 2017  Content Version: 11.7  © 3773-1560 [employer]. Care instructions adapted under license by [facility] ([facility]). If you have questions about a medical condition or this instruction, always ask your healthcare professional. [place] [address] any warranty or liability for your use of this information. Patient Education        How to Get Up Safely After a Fall: Care Instructions  Your Care Instructions    If you have injuries, health problems, or other reasons that may make it easy for you to fall at home, it is a good idea to learn how to get up safely after a fall. Learning how to get up correctly can help you avoid making an injury worse.   Also, knowing what to do if you cannot get up yourself with a blanket or clothing while you wait for help. When should you call for help? Call 911 anytime you think you may need emergency care. For example, call if:    You passed out (lost consciousness).     You cannot get up after a fall.     You have severe pain.    Call your doctor now or seek immediate medical care if:    You have new or worse pain.     You are dizzy or lightheaded.     You hit your head.    Watch closely for changes in your health, and be sure to contact your doctor if:    You do not get better as expected. Where can you learn more? Go to https://chpepiceweb.Groupiter. org and sign in to your Boundless Network account. Enter Q525 in the SavvyCard box to learn more about \"How to Get Up Safely After a Fall: Care Instructions. \"     If you do not have an account, please click on the \"Sign Up Now\" link. Current as of: May 12, 2017  Content Version: 11.7  © 8388-9142 [employer]. Care instructions adapted under license by [facility] ([facility]). If you have questions about a medical condition or this instruction, always ask your healthcare professional. [place] [address] any warranty or liability for your use of this information. Patient Education        Learning About Durable Power of  for Health Care  What is a durable power of  for health care? A durable power of  for health care is one type of the legal forms called advance directives. It lets you decide who you want to make treatment decisions for you if you cannot speak or decide for yourself. The person you choose is called your health care agent. Another type of advance directive is a living will. It lets you write down what kinds of treatment or life support you want or do not want. What should you think about when choosing a health care agent? Choose your health care agent carefully. This person may or may not be a family member.   Talk to the person before you make your final decision. Make sure he or she is comfortable with this responsibility. It's a good idea to choose someone who:  Is at least 25years old. Knows you well and understands what makes life meaningful for you. Understands your Jew and moral values. Will do what you want, not what he or she wants. Will be able to make difficult choices at a stressful time. Will be able to refuse or stop treatment, if that is what you would want, even if you could die. Will be firm and confident with health professionals if needed. Will ask questions to get necessary information. Lives near you or agrees to travel to you if needed. Your family may help you make medical decisions while you can still be part of that process. But it is important to choose one person to be your health care agent in case you are not able to make decisions for yourself. If you don't fill out the legal form and name a health care agent, the decisions your family can make may be limited. Who will make decisions for you if you do not have a health care agent? If you don't have a health care agent or a living will, your family members may disagree about your medical care. And then some medical professionals who may not know you as well might have to make decisions for you. In some cases, a  makes the decisions. When you name a health care agent, it is very clear who has the power to make health decisions for you. How do you name a health care agent? You name your health care agent on a legal form. It is usually called a durable power of  for health care. Ask your hospital, state bar association, or office on aging where to find these forms. You must sign the form to make it legal. Some states require you to get the form notarized. This means that a person called a  watches you sign the form and then he or she signs the form. Some states also require that two or more witnesses sign the form.   Be sure to your own? To live without the help of machines? If you have a choice, where do you want to be cared for? In your home? At a hospital or nursing home? Do you want certain Voodoo practices performed if you become very ill? If you have a choice at the end of your life, where would you prefer to die? At home? In a hospital or nursing home? Somewhere else? Would you prefer to be buried or cremated? Do you want your organs to be donated after you die? What should you do with your living will? Make sure that your family members and your health care agent have copies of your living will. Give your doctor a copy of your living will to keep in your medical record. If you have more than one doctor, make sure that each one has a copy. You may want to put a copy of your living will where it can be easily found. Where can you learn more? Go to https://mPorticopehugoeb.Shareholder InSite. org and sign in to your Novus account. Enter G460 in the Gynzy box to learn more about \"Learning About Living Perronoe. \"     If you do not have an account, please click on the \"Sign Up Now\" link. Current as of: October 6, 2017  Content Version: 11.7  © 5226-1405 [employer]. Care instructions adapted under license by [facility] ([facility]). If you have questions about a medical condition or this instruction, always ask your healthcare professional. [place] [address] any warranty or liability for your use of this information. Patient Education        Advance Directives: Care Instructions  Your Care Instructions  An advance directive is a legal way to state your wishes at the end of your life. It tells your family and your doctor what to do if you can no longer say what you want. There are two main types of advance directives. You can change them any time that your wishes change. A living will tells your family and your doctor your wishes about life support and other treatment.   A durable power of  for health care lets you name a person to make treatment decisions for you when you can't speak for yourself. This person is called a health care agent. If you do not have an advance directive, decisions about your medical care may be made by a doctor or a  who doesn't know you. It may help to think of an advance directive as a gift to the people who care for you. If you have one, they won't have to make tough decisions by themselves. Follow-up care is a key part of your treatment and safety. Be sure to make and go to all appointments, and call your doctor if you are having problems. It's also a good idea to know your test results and keep a list of the medicines you take. How can you care for yourself at home? Discuss your wishes with your loved ones and your doctor. This way, there are no surprises. Many states have a unique form. Or you might use a universal form that has been approved by many states. This kind of form can sometimes be completed and stored online. Your electronic copy will then be available wherever you have a connection to the Internet. In most cases, doctors will respect your wishes even if you have a form from a different state. You don't need a  to do an advance directive. But you may want to get legal advice. Think about these questions when you prepare an advance directive: Who do you want to make decisions about your medical care if you are not able to? Many people choose a family member or close friend. Do you know enough about life support methods that might be used? If not, talk to your doctor so you understand. What are you most afraid of that might happen? You might be afraid of having pain, losing your independence, or being kept alive by machines. Where would you prefer to die? Choices include your home, a hospital, or a nursing home. Would you like to have information about hospice care to support you and your family?   Do you want to donate organs when you die? Do you want certain Hoahaoism practices performed before you die? If so, put your wishes in the advance directive. Read your advance directive every year, and make changes as needed. When should you call for help? Be sure to contact your doctor if you have any questions. Where can you learn more? Go to https://chpepiceweb.JamHub. org and sign in to your Network Contract Solutions account. Enter R264 in the Secret Sales box to learn more about \"Advance Directives: Care Instructions. \"     If you do not have an account, please click on the \"Sign Up Now\" link. Current as of: October 6, 2017  Content Version: 11.7  © 5297-4792 Forward Talent, Incorporated. Care instructions adapted under license by [facility] ([facility]). If you have questions about a medical condition or this instruction, always ask your healthcare professional. [address] any warranty or liability for your use of this information.

## 2018-10-05 NOTE — PROGRESS NOTES
infarction 24/39, complicating knee replacement surgery, 6.)Non q-myocardial infarction, 06/04 [facility]. 7.) Repeat Pesantine, 04/08,large lateral MI, anteroapical MI, EF  43% . Echo 2/15 35% EF mod MR    Diabetes [facility] with [facility]     diet controlled' checks BS @ home every other day    Diverticulosis of intestine     History of    Dyslipidemia 1/26/2016    Elevated LFTs 2/8/2018    GERD (gastroesophageal reflux disease)     Gout     Hepatomegaly     secondary to fatty liver infiltrate on CT scan, 06/05 ,1.) Repeat CT 03/06, stable. 2.)Liver decreased slightly in size on CT, 01/08.  3.)Increased LFTs.  4.)Hepatitis B and C and hemochromatosis testing unremarkable 11/09    Hernia     ventral    Hyperlipidemia     Hypertension     Hyperthyroidism     declines scan/ endo    Hyponatremia     question related to diuretic, question SIADH, stable.  Hypothyroidism     in past now hyperthyroid 2015    Leukopenia     mild with a WBC of 2.5  03/08    Mitral valve regurgitation     Echocardiogram 04/12, ejection fraction 40-45%, moderate MR and LAE. Echo 4/14 EF 40-45%, mod-sev MR Mod-sevTR  Repeat Echo 2/15 EF 35%, Mod MR, Mod TR RSVP 51    Osteoarthritis     Osteopenia     on DEXA, 05/02, T -0.7 spine, T -1.2 hip. 1.)Repeat DEXA 11/04 T-1.8 spine, T-2.0 hip. 2.)DEXA scan, 12/05 T-1.9 spine, T-1.8 hip,  3. )DEXA , T-2.2 spine, T -1.5 hip 01/08    Posterior vitreous detachment     bilateral     Presbyopia     [facility] HT[facility] ([facility]     Echo 8/16 with RVSP 38, mild decrease RV fxn    Upper gastrointestinal bleed     with multiple gastric and duodenal ulcers, 05/04, EGD Dr Saira Elias       Prior to Admission medications    Medication Sig Start Date End Date Taking?  Authorizing Provider   metoprolol tartrate (LOPRESSOR) 50 MG tablet TAKE 1 TABLET TWICE A DAY 9/4/18  Yes REED Mckinley - CNP   ramipril (ALTACE) 10 MG capsule TAKE 1 CAPSULE DAILY 8/27/18  Yes REED Mckinley posterior perineorrhaphy    CORONARY ARTERY BYPASS GRAFT  X 4       CORONARY ARTERY BYPASS GRAFT  03/26/91    x4    HEMORRHOID SURGERY  1960s    procedure done    HYSTERECTOMY  1983    Total abdominal hysterectomy with bilateral salpingo-oophorectomy.  JOINT REPLACEMENT      bilat knee replacements    KNEE ARTHROPLASTY Left 02    left total knee    KNEE ARTHROPLASTY Right 04    right total knee Dr Awa Arita ARTHROSCOPY Left 02    PACEMAKER PLACEMENT      and revision of lead both done , ICD placed , replace battery     PACEMAKER PLACEMENT  09    Defibrillator/biventricular CRT pacemaker.  UPPER GASTROINTESTINAL ENDOSCOPY  04    w/attempted heater probe to bleeding duodenal ulcer       Family History   Problem Relation Age of Onset    Cancer Father         unknown type    Heart Disease Father     Asthma Father     Heart Disease Brother          of a heart attack at age 61.  Other Mother         ruptured appendix    Diabetes Neg Hx     Colon Cancer Neg Hx     Breast Cancer Neg Hx     Glaucoma Neg Hx     Cataracts Neg Hx        Social History   Substance Use Topics    Smoking status: Never Smoker    Smokeless tobacco: Never Used    Alcohol use No       Review of Systems: All 12 systems reviewed and pertinent positives noted above. Lower Extremity Physical Examination:     Vitals:   Vitals:    10/05/18 0858   BP: 126/66   Pulse: 80   Resp: 20     General: AAO x 3 in NAD. Vascular: DP and PT pulses palpable 2/4, bilateral.  CFT <3 seconds, bilateral.  Hair growth present to the level of the digits, bilateral.  Edema present, bilateral.  Varicosities present, bilateral. Erythema absent, bilateral. Distal Rubor absent bilateral.  Temperature within normal limits bilateral. Hyperpigmentation present bilateral. Atrophic skin yes.   Neurological: Sensation intact to light touch to level of digits, bilateral.  Protective sensation intact 10/10 sites via 5.07/10g [place] Monofilament, bilateral.  negative Tinel's, bilateral.  negative Valleix sign, bilateral.  Vibratory intact bilateral.  Reflexes Decreased bilateral.  Paresthesias negative. Dysthesias negative. Sharp/dull intact bilateral.  Musculoskeletal: Muscle strength 5/5, bilateral.  Pain absent upon palpation bilateral. Normal medial longitudinal arch, bilateral.  Ankle ROM decreased,bilateral.  1st MPJ ROM within normal limits, bilateral.  Dorsally contracted digits absent. No other foot deformities. Integument:   Open lesion absent, bilateral.  Interdigital maceration absent to web spaces bilateral.   Nails left 1, 5 and right 1, 5 thickened, dystrophic and crumbly, discolored with subungual debris. Fissures absent, bilateral. Hyperkeratotic tissue is absent. Asessment: Patient is a 80 y.o. female with:    Diagnosis Orders   1. Controlled type 2 diabetes mellitus with diabetic peripheral angiopathy without gangrene, without long-term current use of insulin (HCC)  HM DIABETES FOOT EXAM    KY DEBRIDEMENT OF NAIL(S), 1-5   2. Dermatophytosis of nail   DIABETES FOOT EXAM    KY DEBRIDEMENT OF NAIL(S), 1-5   3. Chronic venous insufficiency   DIABETES FOOT EXAM       Plan: Patient examined and evaluated. Current condition and treatment options discussed in detail. DM foot ed and exam  Orders Placed This Encounter   Procedures     DIABETES FOOT EXAM    KY DEBRIDEMENT OF NAIL(S), 1-5   Patient should consider compression stockings on a regular basis. Also advised importance of continued elevation of the leg. All nails as mentioned above debrided in length and thickness. Patient advised OTC methods for treatment of the mycotic nails. Patient will follow up in 10 weeks. Contact office with any questions/problems/concerns.

## 2018-10-24 NOTE — PROGRESS NOTES
CNP       Allergies:  Bee venom and Morphine    Social History:   reports that she has never smoked. She has never used smokeless tobacco. She reports that she does not drink alcohol. Review of Systems:  · Constitutional: there has been no unanticipated weight loss. · Eyes: No visual changes or diplopia. No scleral icterus. · ENT: No Headaches, hearing loss or vertigo. No mouth sores or sore throat. · Cardiovascular: As above. · Respiratory: As above. · Gastrointestinal: No abdominal pain, appetite loss, blood in stools. No change in bowel or bladder habits. · Genitourinary: No dysuria, trouble voiding, or hematuria. · Musculoskeletal:  No gait disturbance, No weakness. Yes for joint complaints. · Integumentary: No rash or pruritis. · Neurological: No headache, diplopia, change in muscle strength, numbness or tingling. No change in gait, balance, coordination, mood, affect, memory, mentation, behavior. · Psychiatric: No anxiety, or depression. · Endocrine: No temperature intolerance. No excessive thirst, fluid intake, or urination. No tremor. · Hematologic/Lymphatic: No abnormal bruising or bleeding, blood clots or swollen lymph nodes. · Allergic/Immunologic: No nasal congestion or hives. Physical Exam:  /66   Pulse 85   Ht 5' 2.5\" (1.588 m)   Wt 135 lb (61.2 kg)   BMI 24.30 kg/m²   Constitutional and General Appearance: alert, cooperative, no distress and appears stated age  [de-identified]: PERRL, no cervical lymphadenopathy. No masses palpable. Normal oral mucosa  Respiratory:  · Normal excursion and expansion without use of accessory muscles  · Resp Auscultation: Good respiratory effort. No for increased work of breathing. On auscultation: clear to auscultation bilaterally  Cardiovascular:  · The apical impulse is not displaced  · Heart tones are crisp and normal. regular S1 and S2. III/VI holosystolic murmur at the apex.   · Jugular venous pulsation Normal  · The carotid upstroke is normal in

## 2018-12-17 NOTE — PROGRESS NOTES
Subjective:  Janett Nicole is a 80 y.o. female who presents to the office today for routine foot care. Currently denies F/C/N/V. Allergies   Allergen Reactions    Bee Venom Anaphylaxis    Morphine Other (See Comments)     Hallucinations       Past Medical History:   Diagnosis Date    Adrenal gland anomaly     con't. 5.)Repeat CT 09/09 stable with 2.3 cm adrenal mass on the left. This was compared to previous studies. 6.)No further followup planned.  Adrenal mass (HCC)     Hypodensity, 1.5cm, associated with left adrenal gland. Dexamethasone suppression test normal.  Plasma free metanephrines normal. Plan for repeat imaging in 6 to 12 months from initial scan. 1.) CT 03/06 stable,  2.)Left adrenal mass 2.4 cm 01/08. 3) CT scan with 2.4 cm left adrenal mass compatible with adenoma 04/08. 4.) CT scan of abdomen, stable adrenal mass at 2.1 cm 10/08.  Astigmatism of both eyes with presbyopia 7/22/2014    Atrial fibrillation ([address] admit 07/06 and 08/06    1. )Ablation 12/06 with pacemaker placement. 2.)ICD placed 07/09    Bilateral pseudophakia     Cardiomyopathy ([address]     EF 35% echo 2015,  Echo 8/16  mod-sev TR, Mod MR, EF 40%, mild decrease RVfxn    Carotid arterial disease (HCC)     no hemodynamically significant narrowing-declines follow up    Chronic atrial fibrillation ([address] 9/22/2015    Chronic kidney disease 1/26/2016    Chronic venous insufficiency     Coronary artery disease involving coronary bypass graft of native heart without angina pectoris 01/26/2016    1.)Status post CABG, 1991, 2.)Previously following with Dr Isrrael Hogue, 3.)Stents in 1999 and Nov 2002, 4.)Cardiac catheterization 05/09 with recommendation for medical tx Dr Tara Rodriguez, 5.)Non -Q wave myocardial infarction 34/03, complicating knee replacement surgery, 6.)Non q-myocardial infarction, 06/04 [facility]. 7.) Repeat Pesantine, 04/08,large lateral MI, anteroapical MI, EF  43% . Echo 2/15 35% EF mod MR    Diabetes mellitus with COLPORRHAPHY      and posterior perineorrhaphy    CORONARY ARTERY BYPASS GRAFT  X 4       CORONARY ARTERY BYPASS GRAFT  03/26/91    x4    HEMORRHOID SURGERY  1960s    procedure done    HYSTERECTOMY      Total abdominal hysterectomy with bilateral salpingo-oophorectomy.  JOINT REPLACEMENT      bilat knee replacements    KNEE ARTHROPLASTY Left 02    left total knee    KNEE ARTHROPLASTY Right 04    right total knee Dr Bean Hoskins ARTHROSCOPY Left 02    PACEMAKER PLACEMENT      and revision of lead both done , ICD placed , replace battery     PACEMAKER PLACEMENT  09    Defibrillator/biventricular CRT pacemaker.  UPPER GASTROINTESTINAL ENDOSCOPY  04    w/attempted heater probe to bleeding duodenal ulcer       Family History   Problem Relation Age of Onset    Cancer Father         unknown type    Heart Disease Father     Asthma Father     Heart Disease Brother          of a heart attack at age 61.  Other Mother         ruptured appendix    Diabetes Neg Hx     Colon Cancer Neg Hx     Breast Cancer Neg Hx     Glaucoma Neg Hx     Cataracts Neg Hx        Social History   Substance Use Topics    Smoking status: Never Smoker    Smokeless tobacco: Never Used    Alcohol use No       Review of Systems: All 12 systems reviewed and pertinent positives noted above. Lower Extremity Physical Examination:     Vitals:   Vitals:    18 1416   BP: 120/68   Pulse: 72     General: AAO x 3 in NAD. Vascular: DP and PT pulses palpable 2/4, bilateral.  CFT <3 seconds, bilateral.  Hair growth present to the level of the digits, bilateral.  Edema present, bilateral.  Varicosities present, bilateral. Erythema absent, bilateral. Distal Rubor absent bilateral.  Temperature within normal limits bilateral. Hyperpigmentation present bilateral. Atrophic skin yes.   Neurological: Sensation intact to light touch to level of digits, bilateral. Protective sensation intact 10/10 sites via 5.07/10g [place]ana Monofilament, bilateral.  negative Tinel's, bilateral.  negative Valleix sign, bilateral.  Vibratory intact bilateral.  Reflexes Decreased bilateral.  Paresthesias negative. Dysthesias negative. Sharp/dull intact bilateral.  Musculoskeletal: Muscle strength 5/5, bilateral.  Pain absent upon palpation bilateral. Normal medial longitudinal arch, bilateral.  Ankle ROM decreased,bilateral.  1st MPJ ROM within normal limits, bilateral.  Dorsally contracted digits absent. No other foot deformities. Integument:   Open lesion absent, bilateral.  Interdigital maceration absent to web spaces bilateral.   Nails left 1, 5 and right 1, 5 thickened, dystrophic and crumbly, discolored with subungual debris. Fissures absent, bilateral. Hyperkeratotic tissue is absent. Asessment: Patient is a 80 y.o. female with:    Diagnosis Orders   1. Controlled type 2 diabetes mellitus with diabetic peripheral angiopathy without gangrene, without long-term current use of insulin (HCC)   DIABETES FOOT EXAM    [place] DEBRIDEMENT OF NAILS, 6 OR MORE   2. Dermatophytosis of nail   DIABETES FOOT EXAM    HI DEBRIDEMENT OF NAILS, 6 OR MORE       Plan: Patient examined and evaluated. Current condition and treatment options discussed in detail. DM foot ed and exam  Orders Placed This Encounter   Procedures     DIABETES FOOT EXAM    HI DEBRIDEMENT OF NAILS, 6 OR MORE     All nails as mentioned above debrided in length and thickness. Patient advised OTC methods for treatment of the mycotic nails. Patient will follow up in 10 weeks. Contact office with any questions/problems/concerns.

## 2018-12-28 PROBLEM — G31.84 MILD COGNITIVE IMPAIRMENT: Status: ACTIVE | Noted: 2018-01-01

## 2018-12-28 PROBLEM — R79.89 ELEVATED LFTS: Status: RESOLVED | Noted: 2018-02-08 | Resolved: 2018-01-01

## 2018-12-28 NOTE — PROGRESS NOTES
Aleida received counseling on the following healthy behaviors: nutrition and exercise  Reviewed prior labs and health maintenance  Continue current medications, diet and exercise. Discussed use, benefit, and side effects of prescribed medications. Barriers to medication compliance addressed. Patient given educational materials - see patient instructions  Was a self-tracking handout given in paper form or via [employer]? Yes    Requested Prescriptions      No prescriptions requested or ordered in this encounter       All patient questions answered. Patient voiced understanding. Quality Measures    Body mass index is 24.87 kg/m². Normal. Weight control plan discussed: Healthy diet and regular exercise. BP: (!) 118/58. Blood pressure is normal. Treatment plan: See main progress note    Fall Risk 9/24/2018 10/25/2017 4/20/2017 7/26/2016 5/6/2015 4/18/2014   2 or more falls in past year? yes yes no yes no no   Fall with injury in past year? yes no no yes no no     The patient has a history of falls. I did not - not indicated , complete a risk assessment for falls. See progress note for plan, if risk assessment completed. Lab Results   Component Value Date    LDLCHOLESTEROL 43 07/18/2018    (goal LDL reduction with dx if diabetes is 50% LDL reduction)    PHQ Scores 9/24/2018 4/9/2018 4/20/2017 1/26/2016 1/9/2015 1/17/2014   PHQ2 Score 4 1 0 1 0 0   PHQ9 Score 10 1 0 1 0 0     See progress note for plan, if depression exists. Interpretation of Total Score: Major depression if the answer to questions 1 or 2 and 5 or more of questions 1 to 9 are at least [facility] than half the days. \"  Other depressive syndrome if questions 1 or 2 and two, three, or four of questions 1 to 9 are at least [facility] than half the days\"   Depression Severity: 5-9 = Mild depression, 10-14 = Moderate depression, 15-19 = Moderately severe depression, 20-27 = Severe depression

## 2019-01-01 ENCOUNTER — ANTI-COAG VISIT (OUTPATIENT)
Dept: INTERNAL MEDICINE | Age: 84
End: 2019-01-01
Payer: MEDICARE

## 2019-01-01 ENCOUNTER — APPOINTMENT (OUTPATIENT)
Dept: GENERAL RADIOLOGY | Age: 84
DRG: 193 | End: 2019-01-01
Payer: MEDICARE

## 2019-01-01 ENCOUNTER — HOSPITAL ENCOUNTER (OUTPATIENT)
Age: 84
Setting detail: SPECIMEN
Discharge: HOME | End: 2019-04-08
Payer: MEDICARE

## 2019-01-01 ENCOUNTER — TELEPHONE (OUTPATIENT)
Dept: INTERNAL MEDICINE | Age: 84
End: 2019-01-01

## 2019-01-01 ENCOUNTER — HOSPITAL ENCOUNTER (OUTPATIENT)
Age: 84
Setting detail: SPECIMEN
Discharge: HOME | End: 2019-04-12
Payer: MEDICARE

## 2019-01-01 ENCOUNTER — HOSPITAL ENCOUNTER (OUTPATIENT)
Age: 84
Setting detail: SPECIMEN
Discharge: HOME | End: 2019-02-06
Payer: MEDICARE

## 2019-01-01 ENCOUNTER — OFFICE VISIT (OUTPATIENT)
Dept: PODIATRY | Age: 84
End: 2019-01-01
Payer: MEDICARE

## 2019-01-01 ENCOUNTER — OFFICE VISIT (OUTPATIENT)
Dept: CARDIOLOGY | Age: 84
End: 2019-01-01
Payer: MEDICARE

## 2019-01-01 ENCOUNTER — HOSPITAL ENCOUNTER (OUTPATIENT)
Age: 84
Setting detail: SPECIMEN
Discharge: HOME | End: 2019-04-17
Payer: MEDICARE

## 2019-01-01 ENCOUNTER — HOSPITAL ENCOUNTER (OUTPATIENT)
Age: 84
Setting detail: SPECIMEN
Discharge: HOME | End: 2019-04-15
Payer: MEDICARE

## 2019-01-01 ENCOUNTER — HOSPITAL ENCOUNTER (OUTPATIENT)
Age: 84
Setting detail: SPECIMEN
Discharge: HOME | End: 2019-03-20
Payer: MEDICARE

## 2019-01-01 ENCOUNTER — ANTI-COAG VISIT (OUTPATIENT)
Dept: INTERNAL MEDICINE | Age: 84
End: 2019-01-01

## 2019-01-01 ENCOUNTER — APPOINTMENT (OUTPATIENT)
Dept: GENERAL RADIOLOGY | Age: 84
End: 2019-01-01
Payer: MEDICARE

## 2019-01-01 ENCOUNTER — HOSPITAL ENCOUNTER (OUTPATIENT)
Age: 84
Setting detail: SPECIMEN
Discharge: HOME | End: 2019-01-02
Payer: MEDICARE

## 2019-01-01 ENCOUNTER — HOSPITAL ENCOUNTER (EMERGENCY)
Age: 84
Discharge: HOME | End: 2019-03-13
Attending: EMERGENCY MEDICINE
Payer: MEDICARE

## 2019-01-01 ENCOUNTER — OFFICE VISIT (OUTPATIENT)
Dept: INTERNAL MEDICINE | Age: 84
End: 2019-01-01
Payer: MEDICARE

## 2019-01-01 ENCOUNTER — HOSPITAL ENCOUNTER (OUTPATIENT)
Dept: INTERVENTIONAL RADIOLOGY/VASCULAR | Age: 84
Discharge: HOME | End: 2019-02-20
Payer: MEDICARE

## 2019-01-01 ENCOUNTER — HOSPITAL ENCOUNTER (OUTPATIENT)
Age: 84
Discharge: HOME | End: 2019-01-16
Payer: MEDICARE

## 2019-01-01 ENCOUNTER — TELEPHONE (OUTPATIENT)
Dept: CARDIOLOGY | Age: 84
End: 2019-01-01

## 2019-01-01 ENCOUNTER — HOSPITAL ENCOUNTER (OUTPATIENT)
Age: 84
Setting detail: SPECIMEN
Discharge: HOME | End: 2019-03-06
Payer: MEDICARE

## 2019-01-01 ENCOUNTER — HOSPITAL ENCOUNTER (OUTPATIENT)
Age: 84
Discharge: HOME | End: 2019-04-05
Payer: MEDICARE

## 2019-01-01 ENCOUNTER — HOSPITAL ENCOUNTER (OUTPATIENT)
Age: 84
Setting detail: SPECIMEN
Discharge: HOME | End: 2019-02-20
Payer: MEDICARE

## 2019-01-01 ENCOUNTER — HOSPITAL ENCOUNTER (OUTPATIENT)
Age: 84
Setting detail: SPECIMEN
Discharge: HOME | End: 2019-01-23
Payer: MEDICARE

## 2019-01-01 ENCOUNTER — HOSPITAL ENCOUNTER (OUTPATIENT)
Age: 84
Setting detail: SPECIMEN
Discharge: HOME | End: 2019-01-09
Payer: MEDICARE

## 2019-01-01 ENCOUNTER — HOSPITAL ENCOUNTER (INPATIENT)
Age: 84
LOS: 3 days | Discharge: NURSING, REHAB OR LONG-TERM CARE | DRG: 193 | End: 2019-03-30
Attending: EMERGENCY MEDICINE | Admitting: FAMILY MEDICINE
Payer: MEDICARE

## 2019-01-01 ENCOUNTER — HOSPITAL ENCOUNTER (OUTPATIENT)
Age: 84
Setting detail: SPECIMEN
Discharge: HOME | DRG: 193 | End: 2019-03-27
Payer: MEDICARE

## 2019-01-01 ENCOUNTER — HOSPITAL ENCOUNTER (OUTPATIENT)
Age: 84
Setting detail: SPECIMEN
Discharge: HOME | End: 2019-04-03
Payer: MEDICARE

## 2019-01-01 VITALS
SYSTOLIC BLOOD PRESSURE: 122 MMHG | HEIGHT: 62 IN | WEIGHT: 147 LBS | DIASTOLIC BLOOD PRESSURE: 68 MMHG | HEART RATE: 72 BPM | BODY MASS INDEX: 27.05 KG/M2

## 2019-01-01 VITALS
RESPIRATION RATE: 18 BRPM | BODY MASS INDEX: 25.16 KG/M2 | DIASTOLIC BLOOD PRESSURE: 60 MMHG | SYSTOLIC BLOOD PRESSURE: 138 MMHG | HEART RATE: 68 BPM | HEIGHT: 65 IN | WEIGHT: 151 LBS

## 2019-01-01 VITALS
BODY MASS INDEX: 25.05 KG/M2 | SYSTOLIC BLOOD PRESSURE: 112 MMHG | WEIGHT: 141.4 LBS | DIASTOLIC BLOOD PRESSURE: 58 MMHG | HEART RATE: 61 BPM | HEIGHT: 63 IN

## 2019-01-01 VITALS
OXYGEN SATURATION: 92 % | HEIGHT: 65 IN | DIASTOLIC BLOOD PRESSURE: 61 MMHG | RESPIRATION RATE: 24 BRPM | WEIGHT: 159 LBS | SYSTOLIC BLOOD PRESSURE: 95 MMHG | TEMPERATURE: 96.6 F | BODY MASS INDEX: 26.49 KG/M2 | HEART RATE: 64 BPM

## 2019-01-01 VITALS
RESPIRATION RATE: 16 BRPM | HEIGHT: 65 IN | OXYGEN SATURATION: 90 % | HEART RATE: 62 BPM | DIASTOLIC BLOOD PRESSURE: 54 MMHG | WEIGHT: 159.8 LBS | BODY MASS INDEX: 26.62 KG/M2 | TEMPERATURE: 97.2 F | SYSTOLIC BLOOD PRESSURE: 110 MMHG

## 2019-01-01 VITALS
SYSTOLIC BLOOD PRESSURE: 106 MMHG | HEIGHT: 65 IN | TEMPERATURE: 97.3 F | DIASTOLIC BLOOD PRESSURE: 60 MMHG | BODY MASS INDEX: 26.56 KG/M2 | HEART RATE: 74 BPM | WEIGHT: 159.4 LBS | RESPIRATION RATE: 16 BRPM

## 2019-01-01 VITALS
SYSTOLIC BLOOD PRESSURE: 114 MMHG | DIASTOLIC BLOOD PRESSURE: 60 MMHG | WEIGHT: 124 LBS | HEIGHT: 65 IN | BODY MASS INDEX: 20.66 KG/M2 | HEART RATE: 68 BPM

## 2019-01-01 DIAGNOSIS — I48.20 CHRONIC ATRIAL FIBRILLATION (HCC): ICD-10-CM

## 2019-01-01 DIAGNOSIS — E11.51 CONTROLLED TYPE 2 DIABETES MELLITUS WITH DIABETIC PERIPHERAL ANGIOPATHY WITHOUT GANGRENE, WITHOUT LONG-TERM CURRENT USE OF INSULIN (HCC): ICD-10-CM

## 2019-01-01 DIAGNOSIS — Z51.81 MONITORING FOR LONG-TERM ANTICOAGULANT USE: ICD-10-CM

## 2019-01-01 DIAGNOSIS — I65.22 STENOSIS OF LEFT CAROTID ARTERY: ICD-10-CM

## 2019-01-01 DIAGNOSIS — Z79.01 MONITORING FOR LONG-TERM ANTICOAGULANT USE: ICD-10-CM

## 2019-01-01 DIAGNOSIS — I50.22 CHRONIC SYSTOLIC CHF (CONGESTIVE HEART FAILURE) (HCC): Primary | ICD-10-CM

## 2019-01-01 DIAGNOSIS — I25.5 ISCHEMIC CARDIOMYOPATHY: ICD-10-CM

## 2019-01-01 DIAGNOSIS — N30.00 ACUTE CYSTITIS WITHOUT HEMATURIA: ICD-10-CM

## 2019-01-01 DIAGNOSIS — I27.20 PULMONARY HTN (HCC): ICD-10-CM

## 2019-01-01 DIAGNOSIS — B35.1 DERMATOPHYTOSIS OF NAIL: ICD-10-CM

## 2019-01-01 DIAGNOSIS — Z79.01 MONITORING FOR ANTICOAGULANT USE: ICD-10-CM

## 2019-01-01 DIAGNOSIS — E78.2 MIXED HYPERLIPIDEMIA: ICD-10-CM

## 2019-01-01 DIAGNOSIS — J18.9 PNEUMONIA DUE TO ORGANISM: Primary | ICD-10-CM

## 2019-01-01 DIAGNOSIS — F03.91 DEMENTIA WITH BEHAVIORAL DISTURBANCE, UNSPECIFIED DEMENTIA TYPE: ICD-10-CM

## 2019-01-01 DIAGNOSIS — Z51.81 MONITORING FOR ANTICOAGULANT USE: ICD-10-CM

## 2019-01-01 DIAGNOSIS — I50.9 CHRONIC CONGESTIVE HEART FAILURE, UNSPECIFIED HEART FAILURE TYPE (HCC): Primary | ICD-10-CM

## 2019-01-01 DIAGNOSIS — I50.23 ACUTE ON CHRONIC SYSTOLIC CONGESTIVE HEART FAILURE (HCC): ICD-10-CM

## 2019-01-01 DIAGNOSIS — I34.0 NON-RHEUMATIC MITRAL REGURGITATION: ICD-10-CM

## 2019-01-01 DIAGNOSIS — I48.20 CHRONIC ATRIAL FIBRILLATION (HCC): Primary | ICD-10-CM

## 2019-01-01 DIAGNOSIS — R09.89 BRUIT OF LEFT CAROTID ARTERY: ICD-10-CM

## 2019-01-01 DIAGNOSIS — E66.3 OVERWEIGHT: ICD-10-CM

## 2019-01-01 DIAGNOSIS — I87.2 CHRONIC VENOUS INSUFFICIENCY: ICD-10-CM

## 2019-01-01 DIAGNOSIS — R60.0 BILATERAL LEG EDEMA: Primary | ICD-10-CM

## 2019-01-01 DIAGNOSIS — I10 ESSENTIAL HYPERTENSION: ICD-10-CM

## 2019-01-01 DIAGNOSIS — E05.90 HYPERTHYROIDISM: ICD-10-CM

## 2019-01-01 DIAGNOSIS — J18.9 PNEUMONIA OF RIGHT LOWER LOBE DUE TO INFECTIOUS ORGANISM: Primary | ICD-10-CM

## 2019-01-01 DIAGNOSIS — E11.51 CONTROLLED TYPE 2 DM WITH PERIPHERAL CIRCULATORY DISORDER (HCC): Primary | ICD-10-CM

## 2019-01-01 DIAGNOSIS — I25.810 CORONARY ARTERY DISEASE INVOLVING CORONARY BYPASS GRAFT OF NATIVE HEART WITHOUT ANGINA PECTORIS: ICD-10-CM

## 2019-01-01 DIAGNOSIS — I50.42 CHRONIC COMBINED SYSTOLIC AND DIASTOLIC CONGESTIVE HEART FAILURE (HCC): ICD-10-CM

## 2019-01-01 DIAGNOSIS — F41.9 ANXIETY: Primary | ICD-10-CM

## 2019-01-01 DIAGNOSIS — I50.23 ACUTE ON CHRONIC SYSTOLIC CHF (CONGESTIVE HEART FAILURE) (HCC): ICD-10-CM

## 2019-01-01 DIAGNOSIS — M15.9 PRIMARY OSTEOARTHRITIS INVOLVING MULTIPLE JOINTS: ICD-10-CM

## 2019-01-01 DIAGNOSIS — M15.9 PRIMARY OSTEOARTHRITIS INVOLVING MULTIPLE JOINTS: Primary | Chronic | ICD-10-CM

## 2019-01-01 LAB
-: ABNORMAL
-: ABNORMAL
ABSOLUTE EOS #: 0 K/UL (ref 0–0.4)
ABSOLUTE IMMATURE GRANULOCYTE: ABNORMAL K/UL (ref 0–0.3)
ABSOLUTE LYMPH #: 0.2 K/UL (ref 1–4.8)
ABSOLUTE LYMPH #: 0.3 K/UL (ref 1–4.8)
ABSOLUTE LYMPH #: 0.32 K/UL (ref 1–4.8)
ABSOLUTE LYMPH #: 0.4 K/UL (ref 1–4.8)
ABSOLUTE MONO #: 0.16 K/UL (ref 0.1–1.2)
ABSOLUTE MONO #: 0.2 K/UL (ref 0.1–1.2)
ABSOLUTE MONO #: 0.2 K/UL (ref 0.1–1.2)
ABSOLUTE MONO #: 0.3 K/UL (ref 0.1–1.2)
ALBUMIN SERPL-MCNC: 3.4 G/DL (ref 3.5–5.2)
ALBUMIN/GLOBULIN RATIO: 1.3 (ref 1–2.5)
ALP BLD-CCNC: 110 U/L (ref 35–104)
ALT SERPL-CCNC: 43 U/L (ref 5–33)
AMORPHOUS: ABNORMAL
AMORPHOUS: ABNORMAL
ANION GAP SERPL CALCULATED.3IONS-SCNC: 10 MMOL/L (ref 9–17)
ANION GAP SERPL CALCULATED.3IONS-SCNC: 11 MMOL/L (ref 9–17)
ANION GAP SERPL CALCULATED.3IONS-SCNC: 12 MMOL/L (ref 9–17)
ANION GAP SERPL CALCULATED.3IONS-SCNC: 13 MMOL/L (ref 9–17)
ANION GAP SERPL CALCULATED.3IONS-SCNC: 14 MMOL/L (ref 9–17)
ANION GAP SERPL CALCULATED.3IONS-SCNC: 15 MMOL/L (ref 9–17)
ANION GAP SERPL CALCULATED.3IONS-SCNC: 17 MMOL/L (ref 9–17)
ANION GAP SERPL CALCULATED.3IONS-SCNC: 7 MMOL/L (ref 9–17)
AST SERPL-CCNC: 45 U/L
BACTERIA: ABNORMAL
BACTERIA: ABNORMAL
BASOPHILS # BLD: 0 % (ref 0–1)
BASOPHILS # BLD: 1 % (ref 0–1)
BASOPHILS ABSOLUTE: 0 K/UL (ref 0–0.2)
BILIRUB SERPL-MCNC: 0.58 MG/DL (ref 0.3–1.2)
BILIRUBIN URINE: NEGATIVE
BILIRUBIN URINE: NEGATIVE
BNP INTERPRETATION: ABNORMAL
BUN BLDV-MCNC: 14 MG/DL (ref 8–23)
BUN BLDV-MCNC: 14 MG/DL (ref 8–23)
BUN BLDV-MCNC: 16 MG/DL (ref 8–23)
BUN BLDV-MCNC: 17 MG/DL (ref 8–23)
BUN BLDV-MCNC: 17 MG/DL (ref 8–23)
BUN BLDV-MCNC: 19 MG/DL (ref 8–23)
BUN BLDV-MCNC: 19 MG/DL (ref 8–23)
BUN BLDV-MCNC: 20 MG/DL (ref 8–23)
BUN BLDV-MCNC: 21 MG/DL (ref 8–23)
BUN BLDV-MCNC: 23 MG/DL (ref 8–23)
BUN BLDV-MCNC: 26 MG/DL (ref 8–23)
BUN BLDV-MCNC: 32 MG/DL (ref 8–23)
BUN/CREAT BLD: 21 (ref 9–20)
BUN/CREAT BLD: 23 (ref 9–20)
BUN/CREAT BLD: 23 (ref 9–20)
BUN/CREAT BLD: 24 (ref 9–20)
BUN/CREAT BLD: 26 (ref 9–20)
BUN/CREAT BLD: 27 (ref 9–20)
BUN/CREAT BLD: 30 (ref 9–20)
BUN/CREAT BLD: 34 (ref 9–20)
BUN/CREAT BLD: 37 (ref 9–20)
BUN/CREAT BLD: 39 (ref 9–20)
CALCIUM SERPL-MCNC: 8.2 MG/DL (ref 8.6–10.4)
CALCIUM SERPL-MCNC: 8.4 MG/DL (ref 8.6–10.4)
CALCIUM SERPL-MCNC: 8.4 MG/DL (ref 8.6–10.4)
CALCIUM SERPL-MCNC: 8.5 MG/DL (ref 8.6–10.4)
CALCIUM SERPL-MCNC: 8.7 MG/DL (ref 8.6–10.4)
CALCIUM SERPL-MCNC: 8.7 MG/DL (ref 8.6–10.4)
CALCIUM SERPL-MCNC: 8.8 MG/DL (ref 8.6–10.4)
CALCIUM SERPL-MCNC: 9 MG/DL (ref 8.6–10.4)
CALCIUM SERPL-MCNC: 9.2 MG/DL (ref 8.6–10.4)
CALCIUM SERPL-MCNC: 9.4 MG/DL (ref 8.6–10.4)
CASTS UA: ABNORMAL /LPF (ref 0–2)
CASTS UA: ABNORMAL /LPF (ref 0–2)
CHLORIDE BLD-SCNC: 100 MMOL/L (ref 98–107)
CHLORIDE BLD-SCNC: 100 MMOL/L (ref 98–107)
CHLORIDE BLD-SCNC: 102 MMOL/L (ref 98–107)
CHLORIDE BLD-SCNC: 103 MMOL/L (ref 98–107)
CHLORIDE BLD-SCNC: 97 MMOL/L (ref 98–107)
CHLORIDE BLD-SCNC: 97 MMOL/L (ref 98–107)
CHLORIDE BLD-SCNC: 98 MMOL/L (ref 98–107)
CHLORIDE BLD-SCNC: 99 MMOL/L (ref 98–107)
CHOLESTEROL/HDL RATIO: 1.8
CHOLESTEROL: 67 MG/DL
CO2: 24 MMOL/L (ref 20–31)
CO2: 26 MMOL/L (ref 20–31)
CO2: 27 MMOL/L (ref 20–31)
CO2: 29 MMOL/L (ref 20–31)
CO2: 30 MMOL/L (ref 20–31)
CO2: 30 MMOL/L (ref 20–31)
CO2: 31 MMOL/L (ref 20–31)
CO2: 32 MMOL/L (ref 20–31)
CO2: 33 MMOL/L (ref 20–31)
CO2: 34 MMOL/L (ref 20–31)
CO2: 37 MMOL/L (ref 20–31)
CO2: 38 MMOL/L (ref 20–31)
COLOR: ABNORMAL
COLOR: NORMAL
COMMENT UA: ABNORMAL
COMMENT UA: NORMAL
CREAT SERPL-MCNC: 0.59 MG/DL (ref 0.5–0.9)
CREAT SERPL-MCNC: 0.62 MG/DL (ref 0.5–0.9)
CREAT SERPL-MCNC: 0.64 MG/DL (ref 0.5–0.9)
CREAT SERPL-MCNC: 0.66 MG/DL (ref 0.5–0.9)
CREAT SERPL-MCNC: 0.7 MG/DL (ref 0.5–0.9)
CREAT SERPL-MCNC: 0.71 MG/DL (ref 0.5–0.9)
CREAT SERPL-MCNC: 0.73 MG/DL (ref 0.5–0.9)
CREAT SERPL-MCNC: 0.74 MG/DL (ref 0.5–0.9)
CREAT SERPL-MCNC: 0.76 MG/DL (ref 0.5–0.9)
CREAT SERPL-MCNC: 0.78 MG/DL (ref 0.5–0.9)
CREAT SERPL-MCNC: 0.82 MG/DL (ref 0.5–0.9)
CREAT SERPL-MCNC: 0.82 MG/DL (ref 0.5–0.9)
CRYSTALS, UA: ABNORMAL /HPF
CRYSTALS, UA: ABNORMAL /HPF
CULTURE: ABNORMAL
CULTURE: NORMAL
CULTURE: NORMAL
DIFFERENTIAL TYPE: ABNORMAL
DIGOXIN DATE LAST DOSE: ABNORMAL
DIGOXIN DOSE AMOUNT: ABNORMAL
DIGOXIN DOSE TIME: ABNORMAL
DIGOXIN LEVEL: <0.3 NG/ML (ref 0.5–2)
DIRECT EXAM: NORMAL
EKG ATRIAL RATE: 45 BPM
EKG ATRIAL RATE: 68 BPM
EKG ATRIAL RATE: 69 BPM
EKG Q-T INTERVAL: 430 MS
EKG Q-T INTERVAL: 478 MS
EKG Q-T INTERVAL: 496 MS
EKG QRS DURATION: 168 MS
EKG QRS DURATION: 180 MS
EKG QRS DURATION: 184 MS
EKG QTC CALCULATION (BAZETT): 470 MS
EKG QTC CALCULATION (BAZETT): 478 MS
EKG QTC CALCULATION (BAZETT): 503 MS
EKG R AXIS: -65 DEGREES
EKG R AXIS: -66 DEGREES
EKG R AXIS: -67 DEGREES
EKG T AXIS: 114 DEGREES
EKG T AXIS: 114 DEGREES
EKG T AXIS: 123 DEGREES
EKG VENTRICULAR RATE: 60 BPM
EKG VENTRICULAR RATE: 62 BPM
EKG VENTRICULAR RATE: 72 BPM
EOSINOPHILS RELATIVE PERCENT: 0 % (ref 1–7)
EOSINOPHILS RELATIVE PERCENT: 1 % (ref 1–7)
EPITHELIAL CELLS UA: ABNORMAL /HPF (ref 0–5)
EPITHELIAL CELLS UA: ABNORMAL /HPF (ref 0–5)
ESTIMATED AVERAGE GLUCOSE: 126 MG/DL
ESTIMATED AVERAGE GLUCOSE: 128 MG/DL
GFR AFRICAN AMERICAN: >60 ML/MIN
GFR NON-AFRICAN AMERICAN: >60 ML/MIN
GFR SERPL CREATININE-BSD FRML MDRD: ABNORMAL ML/MIN/{1.73_M2}
GLUCOSE BLD-MCNC: 103 MG/DL (ref 70–99)
GLUCOSE BLD-MCNC: 104 MG/DL (ref 70–99)
GLUCOSE BLD-MCNC: 106 MG/DL (ref 65–105)
GLUCOSE BLD-MCNC: 106 MG/DL (ref 70–99)
GLUCOSE BLD-MCNC: 107 MG/DL (ref 65–105)
GLUCOSE BLD-MCNC: 107 MG/DL (ref 65–105)
GLUCOSE BLD-MCNC: 107 MG/DL (ref 70–99)
GLUCOSE BLD-MCNC: 109 MG/DL (ref 70–99)
GLUCOSE BLD-MCNC: 111 MG/DL (ref 70–99)
GLUCOSE BLD-MCNC: 114 MG/DL (ref 70–99)
GLUCOSE BLD-MCNC: 115 MG/DL (ref 65–105)
GLUCOSE BLD-MCNC: 127 MG/DL (ref 70–99)
GLUCOSE BLD-MCNC: 130 MG/DL (ref 65–105)
GLUCOSE BLD-MCNC: 134 MG/DL (ref 65–105)
GLUCOSE BLD-MCNC: 137 MG/DL (ref 70–99)
GLUCOSE BLD-MCNC: 146 MG/DL (ref 65–105)
GLUCOSE BLD-MCNC: 154 MG/DL (ref 65–105)
GLUCOSE BLD-MCNC: 157 MG/DL (ref 70–99)
GLUCOSE BLD-MCNC: 182 MG/DL (ref 65–105)
GLUCOSE BLD-MCNC: 96 MG/DL (ref 70–99)
GLUCOSE BLD-MCNC: 97 MG/DL (ref 70–99)
GLUCOSE URINE: NEGATIVE
GLUCOSE URINE: NEGATIVE
HBA1C MFR BLD: 6 % (ref 4.8–5.9)
HBA1C MFR BLD: 6.1 % (ref 4.8–5.9)
HCT VFR BLD CALC: 33.6 % (ref 36–46)
HCT VFR BLD CALC: 33.7 % (ref 36–46)
HCT VFR BLD CALC: 34.2 % (ref 36–46)
HCT VFR BLD CALC: 34.3 % (ref 36–46)
HCT VFR BLD CALC: 36.9 % (ref 36–46)
HDLC SERPL-MCNC: 38 MG/DL
HEMOGLOBIN: 10.8 G/DL (ref 12–16)
HEMOGLOBIN: 10.9 G/DL (ref 12–16)
HEMOGLOBIN: 11.1 G/DL (ref 12–16)
HEMOGLOBIN: 11.1 G/DL (ref 12–16)
HEMOGLOBIN: 11.8 G/DL (ref 12–16)
IMMATURE GRANULOCYTES: ABNORMAL %
INR BLD: 1.5
INR BLD: 1.6
INR BLD: 1.6
INR BLD: 1.8
INR BLD: 1.9
INR BLD: 1.9
INR BLD: 2.1
INR BLD: 2.7
INR BLD: 2.8
INR BLD: 2.9
INR BLD: 3
INR BLD: 3.1
INR BLD: 3.3
INR BLD: 3.5
INR BLD: 4.3
INR BLD: 4.4
INR BLD: 4.6
INR BLD: 7.2
KETONES, URINE: NEGATIVE
KETONES, URINE: NEGATIVE
LDL CHOLESTEROL: 16 MG/DL (ref 0–130)
LEUKOCYTE ESTERASE, URINE: ABNORMAL
LEUKOCYTE ESTERASE, URINE: NEGATIVE
LYMPHOCYTES # BLD: 17 % (ref 16–46)
LYMPHOCYTES # BLD: 3 % (ref 16–46)
LYMPHOCYTES # BLD: 4 % (ref 16–46)
LYMPHOCYTES # BLD: 6 % (ref 16–46)
Lab: ABNORMAL
Lab: NORMAL
MAGNESIUM: 1.8 MG/DL (ref 1.6–2.6)
MCH RBC QN AUTO: 28.7 PG (ref 26–34)
MCH RBC QN AUTO: 28.8 PG (ref 26–34)
MCH RBC QN AUTO: 28.8 PG (ref 26–34)
MCH RBC QN AUTO: 28.9 PG (ref 26–34)
MCH RBC QN AUTO: 29.4 PG (ref 26–34)
MCHC RBC AUTO-ENTMCNC: 31.9 G/DL (ref 31–37)
MCHC RBC AUTO-ENTMCNC: 32.2 G/DL (ref 31–37)
MCHC RBC AUTO-ENTMCNC: 32.3 G/DL (ref 31–37)
MCHC RBC AUTO-ENTMCNC: 32.4 G/DL (ref 31–37)
MCHC RBC AUTO-ENTMCNC: 32.5 G/DL (ref 31–37)
MCV RBC AUTO: 88.9 FL (ref 80–100)
MCV RBC AUTO: 89 FL (ref 80–100)
MCV RBC AUTO: 89.4 FL (ref 80–100)
MCV RBC AUTO: 89.9 FL (ref 80–100)
MCV RBC AUTO: 91.1 FL (ref 80–100)
MONOCYTES # BLD: 10 % (ref 4–11)
MONOCYTES # BLD: 3 % (ref 4–11)
MONOCYTES # BLD: 3 % (ref 4–11)
MONOCYTES # BLD: 4 % (ref 4–11)
MORPHOLOGY: ABNORMAL
MORPHOLOGY: ABNORMAL
MUCUS: ABNORMAL
MUCUS: ABNORMAL
NITRITE, URINE: NEGATIVE
NITRITE, URINE: NEGATIVE
NRBC AUTOMATED: ABNORMAL PER 100 WBC
OTHER OBSERVATIONS UA: ABNORMAL
OTHER OBSERVATIONS UA: ABNORMAL
PARTIAL THROMBOPLASTIN TIME: 42 SEC (ref 27–35)
PDW BLD-RTO: 14.9 % (ref 11–14.5)
PDW BLD-RTO: 15.1 % (ref 11–14.5)
PDW BLD-RTO: 15.3 % (ref 11–14.5)
PDW BLD-RTO: 15.6 % (ref 11–14.5)
PDW BLD-RTO: 15.7 % (ref 11–14.5)
PH UA: 5.5 (ref 5–6)
PH UA: 5.5 (ref 5–6)
PLATELET # BLD: 105 K/UL (ref 140–450)
PLATELET # BLD: 108 K/UL (ref 140–450)
PLATELET # BLD: 142 K/UL (ref 140–450)
PLATELET # BLD: 145 K/UL (ref 140–450)
PLATELET # BLD: 152 K/UL (ref 140–450)
PLATELET ESTIMATE: ABNORMAL
PMV BLD AUTO: 10 FL (ref 6–12)
PMV BLD AUTO: 10.2 FL (ref 6–12)
PMV BLD AUTO: 8.7 FL (ref 6–12)
PMV BLD AUTO: 9.5 FL (ref 6–12)
PMV BLD AUTO: 9.7 FL (ref 6–12)
POTASSIUM SERPL-SCNC: 2.6 MMOL/L (ref 3.7–5.3)
POTASSIUM SERPL-SCNC: 2.9 MMOL/L (ref 3.7–5.3)
POTASSIUM SERPL-SCNC: 3.3 MMOL/L (ref 3.7–5.3)
POTASSIUM SERPL-SCNC: 3.4 MMOL/L (ref 3.7–5.3)
POTASSIUM SERPL-SCNC: 3.5 MMOL/L (ref 3.7–5.3)
POTASSIUM SERPL-SCNC: 3.7 MMOL/L (ref 3.7–5.3)
POTASSIUM SERPL-SCNC: 3.7 MMOL/L (ref 3.7–5.3)
POTASSIUM SERPL-SCNC: 3.9 MMOL/L (ref 3.7–5.3)
POTASSIUM SERPL-SCNC: 4 MMOL/L (ref 3.7–5.3)
POTASSIUM SERPL-SCNC: 4 MMOL/L (ref 3.7–5.3)
POTASSIUM SERPL-SCNC: 4.1 MMOL/L (ref 3.7–5.3)
POTASSIUM SERPL-SCNC: 4.2 MMOL/L (ref 3.7–5.3)
PRO-BNP: 2727 PG/ML
PRO-BNP: 5196 PG/ML
PRO-BNP: 5897 PG/ML
PRO-BNP: ABNORMAL PG/ML
PROTEIN UA: NEGATIVE
PROTEIN UA: NEGATIVE
PROTHROMBIN TIME: 15.5 SEC (ref 9.4–11.3)
PROTHROMBIN TIME: 16 SEC (ref 9.4–11.3)
PROTHROMBIN TIME: 16.2 SEC (ref 9.4–11.3)
PROTHROMBIN TIME: 17.8 SEC (ref 9.4–11.3)
PROTHROMBIN TIME: 19 SEC (ref 9.4–11.3)
PROTHROMBIN TIME: 19.4 SEC (ref 9.4–11.3)
PROTHROMBIN TIME: 20.7 SEC (ref 9.4–11.3)
PROTHROMBIN TIME: 26.8 SEC (ref 9.4–11.3)
PROTHROMBIN TIME: 28.1 SEC (ref 9.4–11.3)
PROTHROMBIN TIME: 28.9 SEC (ref 9.4–11.3)
PROTHROMBIN TIME: 29.2 SEC (ref 9.4–11.3)
PROTHROMBIN TIME: 30.5 SEC (ref 9.4–11.3)
PROTHROMBIN TIME: 32.1 SEC (ref 9.4–11.3)
PROTHROMBIN TIME: 34.4 SEC (ref 9.4–11.3)
PROTHROMBIN TIME: 41.4 SEC (ref 9.4–11.3)
PROTHROMBIN TIME: 42.8 SEC (ref 9.4–11.3)
PROTHROMBIN TIME: 44.1 SEC (ref 9.4–11.3)
PROTHROMBIN TIME: 68 SEC (ref 9.4–11.3)
RBC # BLD: 3.76 M/UL (ref 4–5.2)
RBC # BLD: 3.76 M/UL (ref 4–5.2)
RBC # BLD: 3.78 M/UL (ref 4–5.2)
RBC # BLD: 3.84 M/UL (ref 4–5.2)
RBC # BLD: 4.11 M/UL (ref 4–5.2)
RBC # BLD: ABNORMAL 10*6/UL
RBC UA: ABNORMAL /HPF (ref 0–4)
RBC UA: ABNORMAL /HPF (ref 0–4)
RENAL EPITHELIAL, UA: ABNORMAL /HPF
RENAL EPITHELIAL, UA: ABNORMAL /HPF
SEG NEUTROPHILS: 71 % (ref 43–77)
SEG NEUTROPHILS: 91 % (ref 43–77)
SEG NEUTROPHILS: 92 % (ref 43–77)
SEG NEUTROPHILS: 94 % (ref 43–77)
SEGMENTED NEUTROPHILS ABSOLUTE COUNT: 1.8 K/UL (ref 1.8–7.7)
SEGMENTED NEUTROPHILS ABSOLUTE COUNT: 4.92 K/UL (ref 1.8–7.7)
SEGMENTED NEUTROPHILS ABSOLUTE COUNT: 6.8 K/UL (ref 1.8–7.7)
SEGMENTED NEUTROPHILS ABSOLUTE COUNT: 7.6 K/UL (ref 1.8–7.7)
SODIUM BLD-SCNC: 138 MMOL/L (ref 135–144)
SODIUM BLD-SCNC: 140 MMOL/L (ref 135–144)
SODIUM BLD-SCNC: 140 MMOL/L (ref 135–144)
SODIUM BLD-SCNC: 141 MMOL/L (ref 135–144)
SODIUM BLD-SCNC: 143 MMOL/L (ref 135–144)
SODIUM BLD-SCNC: 144 MMOL/L (ref 135–144)
SODIUM BLD-SCNC: 144 MMOL/L (ref 135–144)
SODIUM BLD-SCNC: 145 MMOL/L (ref 135–144)
SODIUM BLD-SCNC: 146 MMOL/L (ref 135–144)
SODIUM BLD-SCNC: 147 MMOL/L (ref 135–144)
SPECIFIC GRAVITY UA: 1.01 (ref 1.01–1.02)
SPECIFIC GRAVITY UA: 1.02 (ref 1.01–1.02)
SPECIMEN DESCRIPTION: ABNORMAL
SPECIMEN DESCRIPTION: NORMAL
THYROXINE, FREE: 1.23 NG/DL (ref 0.93–1.7)
TOTAL PROTEIN: 6 G/DL (ref 6.4–8.3)
TRICHOMONAS: ABNORMAL
TRICHOMONAS: ABNORMAL
TRIGL SERPL-MCNC: 66 MG/DL
TROPONIN INTERP: ABNORMAL
TROPONIN T: ABNORMAL NG/ML
TROPONIN, HIGH SENSITIVITY: 17 NG/L (ref 0–14)
TROPONIN, HIGH SENSITIVITY: 19 NG/L (ref 0–14)
TROPONIN, HIGH SENSITIVITY: 27 NG/L (ref 0–14)
TSH SERPL DL<=0.05 MIU/L-ACNC: 0.04 MIU/L (ref 0.3–5)
TURBIDITY: ABNORMAL
TURBIDITY: NORMAL
URINE HGB: NEGATIVE
URINE HGB: NEGATIVE
UROBILINOGEN, URINE: NORMAL
UROBILINOGEN, URINE: NORMAL
VLDLC SERPL CALC-MCNC: ABNORMAL MG/DL (ref 1–30)
WBC # BLD: 2.5 K/UL (ref 3.5–11)
WBC # BLD: 5.4 K/UL (ref 3.5–11)
WBC # BLD: 6.2 K/UL (ref 3.5–11)
WBC # BLD: 7.2 K/UL (ref 3.5–11)
WBC # BLD: 8.2 K/UL (ref 3.5–11)
WBC # BLD: ABNORMAL 10*3/UL
WBC UA: ABNORMAL /HPF (ref 0–4)
WBC UA: ABNORMAL /HPF (ref 0–4)
YEAST: ABNORMAL
YEAST: ABNORMAL

## 2019-01-01 PROCEDURE — 3288F FALL RISK ASSESSMENT DOCD: CPT | Performed by: INTERNAL MEDICINE

## 2019-01-01 PROCEDURE — 94640 AIRWAY INHALATION TREATMENT: CPT

## 2019-01-01 PROCEDURE — APPSS45 APP SPLIT SHARED TIME 31-45 MINUTES: Performed by: NURSE PRACTITIONER

## 2019-01-01 PROCEDURE — 1090F PRES/ABSN URINE INCON ASSESS: CPT | Performed by: INTERNAL MEDICINE

## 2019-01-01 PROCEDURE — 6360000002 HC RX W HCPCS: Performed by: INTERNAL MEDICINE

## 2019-01-01 PROCEDURE — 1036F TOBACCO NON-USER: CPT | Performed by: INTERNAL MEDICINE

## 2019-01-01 PROCEDURE — 85610 PROTHROMBIN TIME: CPT

## 2019-01-01 PROCEDURE — 99285 EMERGENCY DEPT VISIT HI MDM: CPT

## 2019-01-01 PROCEDURE — 83880 ASSAY OF NATRIURETIC PEPTIDE: CPT

## 2019-01-01 PROCEDURE — 87040 BLOOD CULTURE FOR BACTERIA: CPT

## 2019-01-01 PROCEDURE — 6370000000 HC RX 637 (ALT 250 FOR IP): Performed by: FAMILY MEDICINE

## 2019-01-01 PROCEDURE — 94761 N-INVAS EAR/PLS OXIMETRY MLT: CPT

## 2019-01-01 PROCEDURE — G8598 ASA/ANTIPLAT THER USED: HCPCS | Performed by: INTERNAL MEDICINE

## 2019-01-01 PROCEDURE — 1111F DSCHRG MED/CURRENT MED MERGE: CPT | Performed by: INTERNAL MEDICINE

## 2019-01-01 PROCEDURE — 6360000002 HC RX W HCPCS: Performed by: NURSE PRACTITIONER

## 2019-01-01 PROCEDURE — 0518F FALL PLAN OF CARE DOCD: CPT | Performed by: INTERNAL MEDICINE

## 2019-01-01 PROCEDURE — 82947 ASSAY GLUCOSE BLOOD QUANT: CPT

## 2019-01-01 PROCEDURE — 85025 COMPLETE CBC W/AUTO DIFF WBC: CPT

## 2019-01-01 PROCEDURE — 94664 DEMO&/EVAL PT USE INHALER: CPT

## 2019-01-01 PROCEDURE — 93793 ANTICOAG MGMT PT WARFARIN: CPT | Performed by: INTERNAL MEDICINE

## 2019-01-01 PROCEDURE — 99215 OFFICE O/P EST HI 40 MIN: CPT | Performed by: INTERNAL MEDICINE

## 2019-01-01 PROCEDURE — 36415 COLL VENOUS BLD VENIPUNCTURE: CPT

## 2019-01-01 PROCEDURE — 94760 N-INVAS EAR/PLS OXIMETRY 1: CPT

## 2019-01-01 PROCEDURE — 97530 THERAPEUTIC ACTIVITIES: CPT

## 2019-01-01 PROCEDURE — 4040F PNEUMOC VAC/ADMIN/RCVD: CPT | Performed by: INTERNAL MEDICINE

## 2019-01-01 PROCEDURE — 97116 GAIT TRAINING THERAPY: CPT

## 2019-01-01 PROCEDURE — 80162 ASSAY OF DIGOXIN TOTAL: CPT

## 2019-01-01 PROCEDURE — 83036 HEMOGLOBIN GLYCOSYLATED A1C: CPT

## 2019-01-01 PROCEDURE — G8427 DOCREV CUR MEDS BY ELIG CLIN: HCPCS | Performed by: INTERNAL MEDICINE

## 2019-01-01 PROCEDURE — 80048 BASIC METABOLIC PNL TOTAL CA: CPT

## 2019-01-01 PROCEDURE — G8482 FLU IMMUNIZE ORDER/ADMIN: HCPCS | Performed by: INTERNAL MEDICINE

## 2019-01-01 PROCEDURE — 99284 EMERGENCY DEPT VISIT MOD MDM: CPT

## 2019-01-01 PROCEDURE — 6360000002 HC RX W HCPCS: Performed by: EMERGENCY MEDICINE

## 2019-01-01 PROCEDURE — 87077 CULTURE AEROBIC IDENTIFY: CPT

## 2019-01-01 PROCEDURE — 6370000000 HC RX 637 (ALT 250 FOR IP): Performed by: NURSE PRACTITIONER

## 2019-01-01 PROCEDURE — 83735 ASSAY OF MAGNESIUM: CPT

## 2019-01-01 PROCEDURE — 87149 DNA/RNA DIRECT PROBE: CPT

## 2019-01-01 PROCEDURE — 2700000000 HC OXYGEN THERAPY PER DAY

## 2019-01-01 PROCEDURE — 81001 URINALYSIS AUTO W/SCOPE: CPT

## 2019-01-01 PROCEDURE — 99222 1ST HOSP IP/OBS MODERATE 55: CPT | Performed by: INTERNAL MEDICINE

## 2019-01-01 PROCEDURE — G8419 CALC BMI OUT NRM PARAM NOF/U: HCPCS | Performed by: INTERNAL MEDICINE

## 2019-01-01 PROCEDURE — 6370000000 HC RX 637 (ALT 250 FOR IP): Performed by: INTERNAL MEDICINE

## 2019-01-01 PROCEDURE — 2580000003 HC RX 258: Performed by: NURSE PRACTITIONER

## 2019-01-01 PROCEDURE — 1200000000 HC SEMI PRIVATE

## 2019-01-01 PROCEDURE — 93005 ELECTROCARDIOGRAM TRACING: CPT

## 2019-01-01 PROCEDURE — 99239 HOSP IP/OBS DSCHRG MGMT >30: CPT | Performed by: FAMILY MEDICINE

## 2019-01-01 PROCEDURE — 93000 ELECTROCARDIOGRAM COMPLETE: CPT | Performed by: INTERNAL MEDICINE

## 2019-01-01 PROCEDURE — 85027 COMPLETE CBC AUTOMATED: CPT

## 2019-01-01 PROCEDURE — 1123F ACP DISCUSS/DSCN MKR DOCD: CPT | Performed by: INTERNAL MEDICINE

## 2019-01-01 PROCEDURE — 11720 DEBRIDE NAIL 1-5: CPT | Performed by: PODIATRIST

## 2019-01-01 PROCEDURE — 80061 LIPID PANEL: CPT

## 2019-01-01 PROCEDURE — 97116 GAIT TRAINING THERAPY: CPT | Performed by: PHYSICAL THERAPY ASSISTANT

## 2019-01-01 PROCEDURE — 85730 THROMBOPLASTIN TIME PARTIAL: CPT

## 2019-01-01 PROCEDURE — 51798 US URINE CAPACITY MEASURE: CPT

## 2019-01-01 PROCEDURE — 84484 ASSAY OF TROPONIN QUANT: CPT

## 2019-01-01 PROCEDURE — 80053 COMPREHEN METABOLIC PANEL: CPT

## 2019-01-01 PROCEDURE — 51702 INSERT TEMP BLADDER CATH: CPT

## 2019-01-01 PROCEDURE — 87804 INFLUENZA ASSAY W/OPTIC: CPT

## 2019-01-01 PROCEDURE — 84443 ASSAY THYROID STIM HORMONE: CPT

## 2019-01-01 PROCEDURE — 84439 ASSAY OF FREE THYROXINE: CPT

## 2019-01-01 PROCEDURE — 87205 SMEAR GRAM STAIN: CPT

## 2019-01-01 PROCEDURE — 97161 PT EVAL LOW COMPLEX 20 MIN: CPT

## 2019-01-01 PROCEDURE — G8420 CALC BMI NORM PARAMETERS: HCPCS | Performed by: INTERNAL MEDICINE

## 2019-01-01 PROCEDURE — 71045 X-RAY EXAM CHEST 1 VIEW: CPT

## 2019-01-01 PROCEDURE — 99213 OFFICE O/P EST LOW 20 MIN: CPT | Performed by: INTERNAL MEDICINE

## 2019-01-01 PROCEDURE — 1100F PTFALLS ASSESS-DOCD GE2>/YR: CPT | Performed by: INTERNAL MEDICINE

## 2019-01-01 PROCEDURE — 97110 THERAPEUTIC EXERCISES: CPT

## 2019-01-01 PROCEDURE — 2580000003 HC RX 258: Performed by: EMERGENCY MEDICINE

## 2019-01-01 PROCEDURE — 99999 PR OFFICE/OUTPT VISIT,PROCEDURE ONLY: CPT | Performed by: PODIATRIST

## 2019-01-01 PROCEDURE — 99232 SBSQ HOSP IP/OBS MODERATE 35: CPT | Performed by: INTERNAL MEDICINE

## 2019-01-01 PROCEDURE — 96374 THER/PROPH/DIAG INJ IV PUSH: CPT

## 2019-01-01 PROCEDURE — 97165 OT EVAL LOW COMPLEX 30 MIN: CPT | Performed by: OCCUPATIONAL THERAPIST

## 2019-01-01 PROCEDURE — 93880 EXTRACRANIAL BILAT STUDY: CPT

## 2019-01-01 PROCEDURE — 87186 SC STD MICRODIL/AGAR DIL: CPT

## 2019-01-01 RX ORDER — SIMVASTATIN 80 MG
TABLET ORAL
Qty: 90 TABLET | Refills: 3 | Status: SHIPPED | OUTPATIENT
Start: 2019-01-01

## 2019-01-01 RX ORDER — POTASSIUM CHLORIDE 1.5 G/1.77G
20 POWDER, FOR SOLUTION ORAL 2 TIMES DAILY
Qty: 60 EACH | Refills: 3 | Status: SHIPPED | OUTPATIENT
Start: 2019-01-01

## 2019-01-01 RX ORDER — TRAMADOL HYDROCHLORIDE 50 MG/1
50 TABLET ORAL 2 TIMES DAILY
Qty: 60 TABLET | Refills: 5 | Status: SHIPPED | OUTPATIENT
Start: 2019-01-01 | End: 2019-01-01

## 2019-01-01 RX ORDER — DEXTROSE MONOHYDRATE 50 MG/ML
100 INJECTION, SOLUTION INTRAVENOUS PRN
Status: DISCONTINUED | OUTPATIENT
Start: 2019-01-01 | End: 2019-01-01 | Stop reason: HOSPADM

## 2019-01-01 RX ORDER — FUROSEMIDE 10 MG/ML
20 INJECTION INTRAMUSCULAR; INTRAVENOUS ONCE
Status: COMPLETED | OUTPATIENT
Start: 2019-01-01 | End: 2019-01-01

## 2019-01-01 RX ORDER — DONEPEZIL HYDROCHLORIDE 5 MG/1
5 TABLET, FILM COATED ORAL NIGHTLY
Qty: 30 TABLET | Refills: 11 | Status: SHIPPED | OUTPATIENT
Start: 2019-01-01 | End: 2019-01-01 | Stop reason: ALTCHOICE

## 2019-01-01 RX ORDER — SIMVASTATIN 40 MG
80 TABLET ORAL NIGHTLY
Status: DISCONTINUED | OUTPATIENT
Start: 2019-01-01 | End: 2019-01-01 | Stop reason: HOSPADM

## 2019-01-01 RX ORDER — METOPROLOL TARTRATE 50 MG/1
50 TABLET, FILM COATED ORAL 2 TIMES DAILY
Status: DISCONTINUED | OUTPATIENT
Start: 2019-01-01 | End: 2019-01-01 | Stop reason: HOSPADM

## 2019-01-01 RX ORDER — DOCUSATE SODIUM 100 MG/1
100 CAPSULE, LIQUID FILLED ORAL 2 TIMES DAILY
Qty: 60 CAPSULE | Refills: 11 | Status: SHIPPED | OUTPATIENT
Start: 2019-01-01 | End: 2019-01-01

## 2019-01-01 RX ORDER — DOCUSATE SODIUM 100 MG/1
CAPSULE, LIQUID FILLED ORAL
Qty: 60 CAPSULE | Refills: 11 | Status: SHIPPED | OUTPATIENT
Start: 2019-01-01 | End: 2019-01-01

## 2019-01-01 RX ORDER — SODIUM CHLORIDE 0.9 % (FLUSH) 0.9 %
10 SYRINGE (ML) INJECTION PRN
Status: DISCONTINUED | OUTPATIENT
Start: 2019-01-01 | End: 2019-01-01 | Stop reason: HOSPADM

## 2019-01-01 RX ORDER — GUAIFENESIN AND CODEINE PHOSPHATE 100; 10 MG/5ML; MG/5ML
5 SOLUTION ORAL 2 TIMES DAILY PRN
Qty: 150 ML | Refills: 0 | Status: SHIPPED | OUTPATIENT
Start: 2019-01-01 | End: 2019-01-01

## 2019-01-01 RX ORDER — DOCUSATE SODIUM 100 MG/1
100 CAPSULE, LIQUID FILLED ORAL DAILY PRN
COMMUNITY

## 2019-01-01 RX ORDER — NICOTINE POLACRILEX 4 MG
15 LOZENGE BUCCAL PRN
Status: DISCONTINUED | OUTPATIENT
Start: 2019-01-01 | End: 2019-01-01 | Stop reason: HOSPADM

## 2019-01-01 RX ORDER — OYSTER SHELL CALCIUM WITH VITAMIN D 500; 200 MG/1; [IU]/1
1 TABLET, FILM COATED ORAL DAILY
Status: DISCONTINUED | OUTPATIENT
Start: 2019-01-01 | End: 2019-01-01 | Stop reason: HOSPADM

## 2019-01-01 RX ORDER — ALBUTEROL SULFATE 90 UG/1
2 AEROSOL, METERED RESPIRATORY (INHALATION) 4 TIMES DAILY PRN
Qty: 3 INHALER | Refills: 1 | Status: SHIPPED | OUTPATIENT
Start: 2019-01-01 | End: 2019-01-01 | Stop reason: SDUPTHER

## 2019-01-01 RX ORDER — LANOLIN ALCOHOL/MO/W.PET/CERES
5 CREAM (GRAM) TOPICAL NIGHTLY
Status: DISCONTINUED | OUTPATIENT
Start: 2019-01-01 | End: 2019-01-01 | Stop reason: HOSPADM

## 2019-01-01 RX ORDER — RAMIPRIL 10 MG/1
CAPSULE ORAL
Qty: 14 CAPSULE | Refills: 0 | Status: SHIPPED | OUTPATIENT
Start: 2019-01-01 | End: 2019-01-01 | Stop reason: SDUPTHER

## 2019-01-01 RX ORDER — RAMIPRIL 10 MG/1
CAPSULE ORAL
Qty: 30 CAPSULE | Refills: 11 | Status: SHIPPED | OUTPATIENT
Start: 2019-01-01

## 2019-01-01 RX ORDER — FUROSEMIDE 10 MG/ML
60 INJECTION INTRAMUSCULAR; INTRAVENOUS 2 TIMES DAILY
Status: DISCONTINUED | OUTPATIENT
Start: 2019-01-01 | End: 2019-01-01 | Stop reason: HOSPADM

## 2019-01-01 RX ORDER — ACETAMINOPHEN 325 MG/1
650 TABLET ORAL EVERY 4 HOURS PRN
Status: DISCONTINUED | OUTPATIENT
Start: 2019-01-01 | End: 2019-01-01 | Stop reason: HOSPADM

## 2019-01-01 RX ORDER — ROSUVASTATIN CALCIUM 10 MG/1
TABLET, FILM COATED ORAL
Qty: 90 TABLET | Refills: 3 | Status: SHIPPED | OUTPATIENT
Start: 2019-01-01 | End: 2019-01-01

## 2019-01-01 RX ORDER — CEFUROXIME AXETIL 250 MG/1
250 TABLET ORAL 2 TIMES DAILY
Qty: 20 TABLET | Refills: 0 | Status: SHIPPED | OUTPATIENT
Start: 2019-01-01 | End: 2019-01-01

## 2019-01-01 RX ORDER — WARFARIN SODIUM 5 MG/1
5 TABLET ORAL DAILY
COMMUNITY

## 2019-01-01 RX ORDER — ONDANSETRON 2 MG/ML
4 INJECTION INTRAMUSCULAR; INTRAVENOUS EVERY 6 HOURS PRN
Status: DISCONTINUED | OUTPATIENT
Start: 2019-01-01 | End: 2019-01-01 | Stop reason: HOSPADM

## 2019-01-01 RX ORDER — ALBUTEROL SULFATE 90 UG/1
2 AEROSOL, METERED RESPIRATORY (INHALATION)
Status: DISCONTINUED | OUTPATIENT
Start: 2019-01-01 | End: 2019-01-01

## 2019-01-01 RX ORDER — WARFARIN SODIUM 5 MG/1
5 TABLET ORAL DAILY
Status: DISCONTINUED | OUTPATIENT
Start: 2019-01-01 | End: 2019-01-01 | Stop reason: DRUGHIGH

## 2019-01-01 RX ORDER — IPRATROPIUM BROMIDE AND ALBUTEROL SULFATE 2.5; .5 MG/3ML; MG/3ML
1 SOLUTION RESPIRATORY (INHALATION) EVERY 4 HOURS
Status: DISCONTINUED | OUTPATIENT
Start: 2019-01-01 | End: 2019-01-01 | Stop reason: HOSPADM

## 2019-01-01 RX ORDER — BISACODYL 10 MG
10 SUPPOSITORY, RECTAL RECTAL DAILY PRN
COMMUNITY

## 2019-01-01 RX ORDER — POTASSIUM CHLORIDE 20 MEQ/1
40 TABLET, EXTENDED RELEASE ORAL ONCE
Status: COMPLETED | OUTPATIENT
Start: 2019-01-01 | End: 2019-01-01

## 2019-01-01 RX ORDER — GUAIFENESIN 100 MG/5ML
200 SOLUTION ORAL EVERY 4 HOURS PRN
Status: DISCONTINUED | OUTPATIENT
Start: 2019-01-01 | End: 2019-01-01 | Stop reason: HOSPADM

## 2019-01-01 RX ORDER — PREDNISONE 20 MG/1
40 TABLET ORAL DAILY
Status: DISCONTINUED | OUTPATIENT
Start: 2019-01-01 | End: 2019-01-01 | Stop reason: HOSPADM

## 2019-01-01 RX ORDER — IPRATROPIUM BROMIDE AND ALBUTEROL SULFATE 2.5; .5 MG/3ML; MG/3ML
1 SOLUTION RESPIRATORY (INHALATION)
Status: DISCONTINUED | OUTPATIENT
Start: 2019-01-01 | End: 2019-01-01

## 2019-01-01 RX ORDER — FUROSEMIDE 80 MG
80 TABLET ORAL 2 TIMES DAILY
Qty: 60 TABLET | Refills: 5 | Status: SHIPPED | OUTPATIENT
Start: 2019-01-01

## 2019-01-01 RX ORDER — SIMVASTATIN 80 MG
TABLET ORAL
Qty: 90 TABLET | Refills: 3 | Status: SHIPPED | OUTPATIENT
Start: 2019-01-01 | End: 2019-01-01 | Stop reason: SDUPTHER

## 2019-01-01 RX ORDER — POTASSIUM CHLORIDE 7.45 MG/ML
10 INJECTION INTRAVENOUS PRN
Status: DISCONTINUED | OUTPATIENT
Start: 2019-01-01 | End: 2019-01-01 | Stop reason: HOSPADM

## 2019-01-01 RX ORDER — RAMIPRIL 5 MG/1
10 CAPSULE ORAL DAILY
Status: DISCONTINUED | OUTPATIENT
Start: 2019-01-01 | End: 2019-01-01 | Stop reason: HOSPADM

## 2019-01-01 RX ORDER — HYDROXYZINE HYDROCHLORIDE 25 MG/1
25 TABLET, FILM COATED ORAL NIGHTLY
Status: DISCONTINUED | OUTPATIENT
Start: 2019-01-01 | End: 2019-01-01 | Stop reason: HOSPADM

## 2019-01-01 RX ORDER — NICOTINE POLACRILEX 4 MG/1
20 GUM, CHEWING ORAL DAILY
Qty: 30 TABLET | Refills: 11 | Status: SHIPPED | OUTPATIENT
Start: 2019-01-01

## 2019-01-01 RX ORDER — DIGOXIN 125 MCG
125 TABLET ORAL DAILY
Status: DISCONTINUED | OUTPATIENT
Start: 2019-01-01 | End: 2019-01-01 | Stop reason: HOSPADM

## 2019-01-01 RX ORDER — FUROSEMIDE 10 MG/ML
INJECTION INTRAMUSCULAR; INTRAVENOUS
Status: DISCONTINUED
Start: 2019-01-01 | End: 2019-01-01 | Stop reason: HOSPADM

## 2019-01-01 RX ORDER — ALBUTEROL SULFATE 90 UG/1
2 AEROSOL, METERED RESPIRATORY (INHALATION) 4 TIMES DAILY PRN
Qty: 3 INHALER | Refills: 1 | Status: SHIPPED | OUTPATIENT
Start: 2019-01-01

## 2019-01-01 RX ORDER — ALBUTEROL SULFATE 2.5 MG/3ML
2.5 SOLUTION RESPIRATORY (INHALATION)
Status: DISCONTINUED | OUTPATIENT
Start: 2019-01-01 | End: 2019-01-01 | Stop reason: HOSPADM

## 2019-01-01 RX ORDER — FUROSEMIDE 10 MG/ML
40 INJECTION INTRAMUSCULAR; INTRAVENOUS 2 TIMES DAILY
Status: DISCONTINUED | OUTPATIENT
Start: 2019-01-01 | End: 2019-01-01

## 2019-01-01 RX ORDER — POTASSIUM CHLORIDE 20 MEQ/1
20 TABLET, EXTENDED RELEASE ORAL DAILY
Qty: 180 TABLET | Refills: 3 | Status: SHIPPED | OUTPATIENT
Start: 2019-01-01 | End: 2019-01-01 | Stop reason: ALTCHOICE

## 2019-01-01 RX ORDER — ASPIRIN 81 MG/1
81 TABLET ORAL DAILY
Status: DISCONTINUED | OUTPATIENT
Start: 2019-01-01 | End: 2019-01-01 | Stop reason: HOSPADM

## 2019-01-01 RX ORDER — FUROSEMIDE 20 MG/1
60 TABLET ORAL 2 TIMES DAILY
COMMUNITY
End: 2019-01-01 | Stop reason: DRUGHIGH

## 2019-01-01 RX ORDER — IBUPROFEN 400 MG/1
400 TABLET ORAL EVERY 8 HOURS PRN
COMMUNITY

## 2019-01-01 RX ORDER — DEXTROSE MONOHYDRATE 25 G/50ML
12.5 INJECTION, SOLUTION INTRAVENOUS PRN
Status: DISCONTINUED | OUTPATIENT
Start: 2019-01-01 | End: 2019-01-01 | Stop reason: HOSPADM

## 2019-01-01 RX ORDER — METHIMAZOLE 5 MG/1
5 TABLET ORAL DAILY
COMMUNITY

## 2019-01-01 RX ORDER — CEPHALEXIN 500 MG/1
500 CAPSULE ORAL 2 TIMES DAILY
Qty: 20 CAPSULE | Refills: 0 | Status: SHIPPED | OUTPATIENT
Start: 2019-01-01 | End: 2019-01-01

## 2019-01-01 RX ORDER — METHIMAZOLE 5 MG/1
5 TABLET ORAL DAILY
Status: DISCONTINUED | OUTPATIENT
Start: 2019-01-01 | End: 2019-01-01 | Stop reason: HOSPADM

## 2019-01-01 RX ORDER — PANTOPRAZOLE SODIUM 40 MG/1
40 TABLET, DELAYED RELEASE ORAL
Status: DISCONTINUED | OUTPATIENT
Start: 2019-01-01 | End: 2019-01-01 | Stop reason: HOSPADM

## 2019-01-01 RX ORDER — SODIUM CHLORIDE 0.9 % (FLUSH) 0.9 %
10 SYRINGE (ML) INJECTION EVERY 12 HOURS SCHEDULED
Status: DISCONTINUED | OUTPATIENT
Start: 2019-01-01 | End: 2019-01-01 | Stop reason: HOSPADM

## 2019-01-01 RX ORDER — ALPRAZOLAM 0.5 MG/1
0.5 TABLET ORAL 2 TIMES DAILY PRN
Qty: 60 TABLET | Refills: 5 | Status: SHIPPED | OUTPATIENT
Start: 2019-01-01 | End: 2019-01-01

## 2019-01-01 RX ORDER — METHYLPREDNISOLONE SODIUM SUCCINATE 40 MG/ML
40 INJECTION, POWDER, LYOPHILIZED, FOR SOLUTION INTRAMUSCULAR; INTRAVENOUS EVERY 6 HOURS
Status: COMPLETED | OUTPATIENT
Start: 2019-01-01 | End: 2019-01-01

## 2019-01-01 RX ORDER — POTASSIUM CHLORIDE 20 MEQ/1
40 TABLET, EXTENDED RELEASE ORAL PRN
Status: DISCONTINUED | OUTPATIENT
Start: 2019-01-01 | End: 2019-01-01 | Stop reason: HOSPADM

## 2019-01-01 RX ADMIN — FUROSEMIDE 20 MG: 10 INJECTION, SOLUTION INTRAVENOUS at 10:52

## 2019-01-01 RX ADMIN — FUROSEMIDE 60 MG: 10 INJECTION, SOLUTION INTRAVENOUS at 21:15

## 2019-01-01 RX ADMIN — PANTOPRAZOLE SODIUM 40 MG: 40 TABLET, DELAYED RELEASE ORAL at 06:28

## 2019-01-01 RX ADMIN — IPRATROPIUM BROMIDE AND ALBUTEROL SULFATE 1 AMPULE: .5; 3 SOLUTION RESPIRATORY (INHALATION) at 16:04

## 2019-01-01 RX ADMIN — PIPERACILLIN SODIUM,TAZOBACTAM SODIUM 3.38 G: 3; .375 INJECTION, POWDER, FOR SOLUTION INTRAVENOUS at 21:14

## 2019-01-01 RX ADMIN — METHYLPREDNISOLONE SODIUM SUCCINATE 40 MG: 125 INJECTION, POWDER, FOR SOLUTION INTRAMUSCULAR; INTRAVENOUS at 17:09

## 2019-01-01 RX ADMIN — PIPERACILLIN SODIUM,TAZOBACTAM SODIUM 3.38 G: 3; .375 INJECTION, POWDER, FOR SOLUTION INTRAVENOUS at 20:35

## 2019-01-01 RX ADMIN — FUROSEMIDE 60 MG: 10 INJECTION, SOLUTION INTRAVENOUS at 08:00

## 2019-01-01 RX ADMIN — ALBUTEROL SULFATE 2.5 MG: 2.5 SOLUTION RESPIRATORY (INHALATION) at 22:31

## 2019-01-01 RX ADMIN — ASPIRIN 81 MG: 81 TABLET, COATED ORAL at 07:53

## 2019-01-01 RX ADMIN — ASPIRIN 81 MG: 81 TABLET, COATED ORAL at 08:23

## 2019-01-01 RX ADMIN — WARFARIN SODIUM 3 MG: 2 TABLET ORAL at 17:37

## 2019-01-01 RX ADMIN — IPRATROPIUM BROMIDE AND ALBUTEROL SULFATE 1 AMPULE: .5; 3 SOLUTION RESPIRATORY (INHALATION) at 03:48

## 2019-01-01 RX ADMIN — PANTOPRAZOLE SODIUM 40 MG: 40 TABLET, DELAYED RELEASE ORAL at 06:39

## 2019-01-01 RX ADMIN — METHYLPREDNISOLONE SODIUM SUCCINATE 40 MG: 125 INJECTION, POWDER, FOR SOLUTION INTRAMUSCULAR; INTRAVENOUS at 17:34

## 2019-01-01 RX ADMIN — FUROSEMIDE 60 MG: 10 INJECTION, SOLUTION INTRAVENOUS at 08:24

## 2019-01-01 RX ADMIN — Medication 1 TABLET: at 07:53

## 2019-01-01 RX ADMIN — PIPERACILLIN SODIUM,TAZOBACTAM SODIUM 3.38 G: 3; .375 INJECTION, POWDER, FOR SOLUTION INTRAVENOUS at 04:09

## 2019-01-01 RX ADMIN — POTASSIUM CHLORIDE 40 MEQ: 20 TABLET, EXTENDED RELEASE ORAL at 10:25

## 2019-01-01 RX ADMIN — ACETAMINOPHEN 650 MG: 325 TABLET ORAL at 22:45

## 2019-01-01 RX ADMIN — Medication 1 TABLET: at 08:23

## 2019-01-01 RX ADMIN — PANTOPRAZOLE SODIUM 40 MG: 40 TABLET, DELAYED RELEASE ORAL at 05:15

## 2019-01-01 RX ADMIN — METHIMAZOLE 5 MG: 5 TABLET ORAL at 07:59

## 2019-01-01 RX ADMIN — Medication 10 ML: at 21:15

## 2019-01-01 RX ADMIN — PIPERACILLIN SODIUM,TAZOBACTAM SODIUM 3.38 G: 3; .375 INJECTION, POWDER, FOR SOLUTION INTRAVENOUS at 20:01

## 2019-01-01 RX ADMIN — METOPROLOL TARTRATE 50 MG: 50 TABLET ORAL at 07:53

## 2019-01-01 RX ADMIN — METOPROLOL TARTRATE 50 MG: 50 TABLET ORAL at 07:59

## 2019-01-01 RX ADMIN — INSULIN LISPRO 2 UNITS: 100 INJECTION, SOLUTION INTRAVENOUS; SUBCUTANEOUS at 11:56

## 2019-01-01 RX ADMIN — FUROSEMIDE 40 MG: 10 INJECTION, SOLUTION INTRAMUSCULAR; INTRAVENOUS at 22:44

## 2019-01-01 RX ADMIN — DIGOXIN 125 MCG: 125 TABLET ORAL at 07:59

## 2019-01-01 RX ADMIN — IPRATROPIUM BROMIDE AND ALBUTEROL SULFATE 1 AMPULE: .5; 3 SOLUTION RESPIRATORY (INHALATION) at 07:57

## 2019-01-01 RX ADMIN — INSULIN LISPRO 1 UNITS: 100 INJECTION, SOLUTION INTRAVENOUS; SUBCUTANEOUS at 21:20

## 2019-01-01 RX ADMIN — SIMVASTATIN 80 MG: 40 TABLET, FILM COATED ORAL at 21:22

## 2019-01-01 RX ADMIN — METHIMAZOLE 5 MG: 5 TABLET ORAL at 10:25

## 2019-01-01 RX ADMIN — METHYLPREDNISOLONE SODIUM SUCCINATE 40 MG: 125 INJECTION, POWDER, FOR SOLUTION INTRAMUSCULAR; INTRAVENOUS at 22:44

## 2019-01-01 RX ADMIN — RAMIPRIL 10 MG: 5 CAPSULE ORAL at 07:54

## 2019-01-01 RX ADMIN — PIPERACILLIN SODIUM,TAZOBACTAM SODIUM 3.38 G: 3; .375 INJECTION, POWDER, FOR SOLUTION INTRAVENOUS at 12:15

## 2019-01-01 RX ADMIN — Medication 1 TABLET: at 07:59

## 2019-01-01 RX ADMIN — IPRATROPIUM BROMIDE AND ALBUTEROL SULFATE 1 AMPULE: .5; 3 SOLUTION RESPIRATORY (INHALATION) at 08:33

## 2019-01-01 RX ADMIN — POTASSIUM CHLORIDE 40 MEQ: 20 TABLET, EXTENDED RELEASE ORAL at 21:40

## 2019-01-01 RX ADMIN — HYDROXYZINE HYDROCHLORIDE 25 MG: 25 TABLET, FILM COATED ORAL at 21:21

## 2019-01-01 RX ADMIN — DIGOXIN 125 MCG: 125 TABLET ORAL at 08:23

## 2019-01-01 RX ADMIN — METHYLPREDNISOLONE SODIUM SUCCINATE 40 MG: 125 INJECTION, POWDER, FOR SOLUTION INTRAMUSCULAR; INTRAVENOUS at 03:33

## 2019-01-01 RX ADMIN — MELATONIN 4.5 MG: 3 TAB ORAL at 21:41

## 2019-01-01 RX ADMIN — METHYLPREDNISOLONE SODIUM SUCCINATE 40 MG: 125 INJECTION, POWDER, FOR SOLUTION INTRAMUSCULAR; INTRAVENOUS at 11:56

## 2019-01-01 RX ADMIN — Medication 10 ML: at 01:32

## 2019-01-01 RX ADMIN — ASPIRIN 81 MG: 81 TABLET, COATED ORAL at 07:59

## 2019-01-01 RX ADMIN — PREDNISONE 40 MG: 20 TABLET ORAL at 08:23

## 2019-01-01 RX ADMIN — FUROSEMIDE 20 MG: 10 INJECTION, SOLUTION INTRAVENOUS at 12:15

## 2019-01-01 RX ADMIN — ALBUTEROL SULFATE 2.5 MG: 2.5 SOLUTION RESPIRATORY (INHALATION) at 13:24

## 2019-01-01 RX ADMIN — PIPERACILLIN SODIUM,TAZOBACTAM SODIUM 3.38 G: 3; .375 INJECTION, POWDER, FOR SOLUTION INTRAVENOUS at 14:01

## 2019-01-01 RX ADMIN — FUROSEMIDE 60 MG: 10 INJECTION, SOLUTION INTRAVENOUS at 21:20

## 2019-01-01 RX ADMIN — IPRATROPIUM BROMIDE AND ALBUTEROL SULFATE 1 AMPULE: .5; 3 SOLUTION RESPIRATORY (INHALATION) at 04:35

## 2019-01-01 RX ADMIN — PIPERACILLIN SODIUM,TAZOBACTAM SODIUM 3.38 G: 3; .375 INJECTION, POWDER, FOR SOLUTION INTRAVENOUS at 03:12

## 2019-01-01 RX ADMIN — IPRATROPIUM BROMIDE AND ALBUTEROL SULFATE 1 AMPULE: .5; 3 SOLUTION RESPIRATORY (INHALATION) at 13:05

## 2019-01-01 RX ADMIN — HYDROXYZINE HYDROCHLORIDE 25 MG: 25 TABLET, FILM COATED ORAL at 21:22

## 2019-01-01 RX ADMIN — SIMVASTATIN 80 MG: 40 TABLET, FILM COATED ORAL at 22:45

## 2019-01-01 RX ADMIN — SIMVASTATIN 80 MG: 40 TABLET, FILM COATED ORAL at 21:19

## 2019-01-01 RX ADMIN — METHYLPREDNISOLONE SODIUM SUCCINATE 40 MG: 125 INJECTION, POWDER, FOR SOLUTION INTRAMUSCULAR; INTRAVENOUS at 04:06

## 2019-01-01 RX ADMIN — Medication 10 ML: at 08:25

## 2019-01-01 RX ADMIN — MELATONIN 4.5 MG: 3 TAB ORAL at 21:19

## 2019-01-01 RX ADMIN — Medication 10 ML: at 22:46

## 2019-01-01 RX ADMIN — IPRATROPIUM BROMIDE AND ALBUTEROL SULFATE 1 AMPULE: .5; 3 SOLUTION RESPIRATORY (INHALATION) at 19:36

## 2019-01-01 RX ADMIN — MELATONIN 4.5 MG: 3 TAB ORAL at 22:45

## 2019-01-01 RX ADMIN — METOPROLOL TARTRATE 50 MG: 50 TABLET ORAL at 08:23

## 2019-01-01 RX ADMIN — DIGOXIN 125 MCG: 125 TABLET ORAL at 07:53

## 2019-01-01 RX ADMIN — IPRATROPIUM BROMIDE AND ALBUTEROL SULFATE 1 AMPULE: .5; 3 SOLUTION RESPIRATORY (INHALATION) at 11:35

## 2019-01-01 RX ADMIN — RAMIPRIL 10 MG: 5 CAPSULE ORAL at 07:59

## 2019-01-01 RX ADMIN — IPRATROPIUM BROMIDE AND ALBUTEROL SULFATE 1 AMPULE: .5; 3 SOLUTION RESPIRATORY (INHALATION) at 23:55

## 2019-01-01 RX ADMIN — METHYLPREDNISOLONE SODIUM SUCCINATE 40 MG: 125 INJECTION, POWDER, FOR SOLUTION INTRAMUSCULAR; INTRAVENOUS at 10:24

## 2019-01-01 RX ADMIN — RAMIPRIL 10 MG: 5 CAPSULE ORAL at 08:23

## 2019-01-01 RX ADMIN — PIPERACILLIN SODIUM,TAZOBACTAM SODIUM 3.38 G: 3; .375 INJECTION, POWDER, FOR SOLUTION INTRAVENOUS at 03:31

## 2019-01-01 RX ADMIN — FUROSEMIDE 40 MG: 10 INJECTION, SOLUTION INTRAMUSCULAR; INTRAVENOUS at 07:54

## 2019-01-01 RX ADMIN — METHIMAZOLE 5 MG: 5 TABLET ORAL at 08:23

## 2019-01-01 RX ADMIN — Medication 10 ML: at 20:35

## 2019-01-01 RX ADMIN — INSULIN LISPRO 1 UNITS: 100 INJECTION, SOLUTION INTRAVENOUS; SUBCUTANEOUS at 21:17

## 2019-01-01 RX ADMIN — METHYLPREDNISOLONE SODIUM SUCCINATE 40 MG: 125 INJECTION, POWDER, FOR SOLUTION INTRAMUSCULAR; INTRAVENOUS at 21:45

## 2019-01-01 ASSESSMENT — PAIN SCALES - GENERAL
PAINLEVEL_OUTOF10: 0

## 2019-03-14 PROBLEM — I50.22 CHRONIC SYSTOLIC CONGESTIVE HEART FAILURE (HCC): Status: ACTIVE | Noted: 2019-01-01

## 2019-03-27 PROBLEM — J18.9 PNEUMONIA: Status: ACTIVE | Noted: 2019-01-01

## 2019-03-28 PROBLEM — I50.42 CHRONIC COMBINED SYSTOLIC AND DIASTOLIC CONGESTIVE HEART FAILURE (HCC): Status: ACTIVE | Noted: 2019-01-01

## 2019-03-28 NOTE — PROGRESS NOTES
Occupational Therapy   Occupational Therapy Initial Assessment  Date: 3/28/2019   Patient Name: Jim Collazo  MRN: [id number]     : 1931    Date of Service: 3/28/2019    Discharge Recommendations:  ECF with OT       Assessment   Performance deficits / Impairments: Decreased functional mobility ; Decreased ADL status; Decreased strength;Decreased cognition;Decreased endurance;Decreased balance;Decreased safe awareness;Decreased high-level IADLs;Decreased coordination  Treatment Diagnosis: general weakness  Prognosis: Fair  Decision Making: Low Complexity  REQUIRES OT FOLLOW UP: Yes  Safety Devices  Safety Devices in place: Yes  Type of devices: Bed alarm in place; Left in bed;Call light within reach           Patient Diagnosis(es): The encounter diagnosis was Pneumonia due to organism. has a past medical history of Adrenal gland anomaly, Adrenal mass ([address] Astigmatism of both eyes with presbyopia, Atrial fibrillation ([address] Bilateral pseudophakia, Cardiomyopathy ([address] Carotid arterial disease ([address] Chronic atrial fibrillation ([address] Chronic kidney disease, Chronic venous insufficiency, Coronary artery disease involving coronary bypass graft of native heart without angina pectoris, Diabetes mellitus with peripheral vascular disease ([address] Diverticulosis of intestine, Dyslipidemia, Elevated LFTs, GERD (gastroesophageal reflux disease), Gout, Hepatomegaly, Hernia, Hyperlipidemia, Hypertension, Hyperthyroidism, Hyponatremia, Hypothyroidism, Leukopenia, Mitral valve regurgitation, Osteoarthritis, Osteopenia, Posterior vitreous detachment, Presbyopia, Pulmonary HTN ([address] and Upper gastrointestinal bleed. has a past surgical history that includes Coronary artery bypass graft (X 4   ); pacemaker placement (); Cholecystectomy; joint replacement; Cardiac defibrillator placement (2013); Appendectomy (); Hemorrhoid surgery (); Hysterectomy ();  Coronary artery bypass graft (91);

## 2019-03-28 NOTE — PROGRESS NOTES
Hospitalist Progress Note    Patient:  Sirena Medina     YOB: 1931    MRN: [id number]   Admit date: 3/27/2019     Acct: [de-identified]     PCP: Vannessa Buck DO    CC--Interval History:   Pneumonia--acute respiratory failure with hypoxia--on IV antibiotics--med nebs--O2    K = 3.5 --> potassium replacement    CHF--acute-on-chronic systolic    Patient drzx-pkaisnasan-yplqjqls-available records reviewed, including, but not limited to, ER reports--labs--imaging--office records--personal notes    See note below     All other ROS negative except noted in HPI    Diet:  DIET CARDIAC; Carb Control: 4 carb choices (60 gms)/meal; Daily Fluid Restriction: 1500 ml    Medications:  Scheduled Meds:   warfarin (COUMADIN) daily dosing (placeholder)   Other RX Placeholder    warfarin  3 mg Oral Once    methimazole  5 mg Oral Daily    furosemide  60 mg Intravenous BID    aspirin  81 mg Oral Daily    digoxin  125 mcg Oral Daily    calcium-vitamin D  1 tablet Oral Daily    hydrOXYzine  25 mg Oral Nightly    melatonin  4.5 mg Oral Nightly    metoprolol tartrate  50 mg Oral BID    pantoprazole  40 mg Oral QAM AC    ramipril  10 mg Oral Daily    simvastatin  80 mg Oral Nightly    sodium chloride flush  10 mL Intravenous 2 times per day    methylPREDNISolone  40 mg Intravenous Q6H    Followed by   Hermila Craft ON 3/30/2019] predniSONE  40 mg Oral Daily    insulin lispro  0-12 Units Subcutaneous TID WC    insulin lispro  0-6 Units Subcutaneous Nightly    piperacillin-tazobactam  3.375 g Intravenous Q8H    ipratropium-albuterol  1 ampule Inhalation Q4H     Continuous Infusions:   dextrose       PRN Meds:magnesium hydroxide, ondansetron, albuterol, glucose, dextrose, glucagon (rDNA), dextrose, sodium chloride flush, acetaminophen    Objective:  Labs:  CBC with Differential:    Lab Results   Component Value Date    WBC 6.2 03/28/2019    RBC 3.78 03/28/2019    HGB 10.9 03/28/2019    HCT 33.6 03/28/2019    PLT problems.  *    NELSON DIAMOND dc   IM  80  WF  [sr Carole, IM;  DC Cardiology--[facility]]  FULL CODE   COUMADIN--ASPIRIN   PACEMAKER--AICD    Anti-infectives:  Zosyn IV    Pneumonia----3.27.2019---fever         CXR--3.27.2019--right basilar opacification--effusion     ASCVD         EKG---3.27.2019---ventricular -paced rhythm--74--frequent consecutive PVCs          2D ECHO---8.6.2018---mildly dilated LA--mild enlarged LV---global                          hypokinesis LV--RA dilatation--RV dilatation--NRVSF--MAC--                          moderate-to-severe MR--BASIA without stenosis--moderate-to-severe                           TR--RVSP ~ 69 mm Hg---severe pulmonary hypertension--                           increased diameter IVC----LVEF ~ 40%          non-Q-MI--2002--2008          Stents x 3---1999          CABG x 4---3.26.1991  Cardiomyopathy  CHF--acute-on-chronic systolic  Pulmonary hypertension    Atrial fibrillation          Pacemaker--2006--2009          AICD--2013--2017  Hypertension  Hyperlipidemia  Diabetes Mellitus Type 2  CKD--Stage   GERD  Hyperthyroidism  Chronic venous insufficiency  Cognitive impairment    PMH:  adrenal mass, OA, osteoporosis, astigmatism--presbyopia,              diverticulosis, gout, hepatomegaly---fatty infiltration, hyponatremia,             leukopenia, posterior vitreous detachment, upper GI bleed  PSH:   see above, colporraphy--1999--posterior perineorraphy, left             capsulotomy--2012, EGD--2004, cholecystectomy, right TKA--2004,              left TKA--2002, left knee arthroscopy--2002, [facility]--             1983--cervix unknown, appendectomy---1948, bilateral cataracts--             left-2009---right-2008  cholecystectomy, hemorrhoidectomy--1960s,     Allergies:        bee venom--anaphylaxis  Intolerances:  morphine--hallucinations      Attending Supervising [de-identified] Attestation Statement  I performed a history and physical examination on the patient and discussed the management with the nurse practitioner. I reviewed and agree with the findings and plan as documented in her note . Electronically signed by Shalonda Castro on 3/28/19 at 9:28 PM        Plan:  1. Pneumonia--IV antibiotics--med nebs--O2  2. CHF--diuretics--daily weight--IO---fluid restriction as required  3. Medications reviewed  4. Potassium replacement  5.   See orders    Electronically signed by Shalonda Castro on 3/28/2019 at 1:31 PM    Hospitalist

## 2019-03-28 NOTE — CONSULTS
CONSTITUTIONAL:awake, alert, cooperative, no apparent distress, and appears stated age  HEENT:pupils equal, round, sclera clear, conjunctiva normal.   Supple neck. NO THYROMEGALY  LUNGS:  No increased work of breathing, auscultation:DECREASED bs at bases   CARDIOVASCULAR:  Normal apical impulse,   regular rate and rhythm, NO s3, +hsm lsb  JVP:elevated   Carotids:NL  ABDOMEN:  No scars, normal bowel sounds, soft, non-distended, non-tender, no masses palpated, no hepatosplenomegally  MUSCULOSKELETAL:no redness, warmth, or swelling of the joints  NEUROLOGIC:  Awake, alert, oriented to name, place and time.    SKIN:  no bruising or bleeding, normal skin color, texture, turgor  LE:+3    DATA:   ECG:V-PACED RHYTHM      Lab:   Lab Results   Component Value Date     03/28/2019    K 3.5 03/28/2019    CL 99 03/28/2019    CO2 27 03/28/2019    BUN 20 03/28/2019    CREATININE 0.73 03/28/2019    GFRAA >60 03/28/2019    LABGLOM >60 03/28/2019    GLUCOSE 127 03/28/2019    PROT 6.0 03/13/2019    PROT 7.4 05/26/2012    LABALBU 3.4 03/13/2019    CALCIUM 8.2 03/28/2019    BILITOT 0.58 03/13/2019    ALKPHOS 110 03/13/2019    AST 45 03/13/2019    ALT 43 03/13/2019     Magnesium:    Lab Results   Component Value Date    MG 1.8 03/28/2019     Warfarin PT/INR:    Lab Results   Component Value Date    PROTIME 30.5 03/28/2019    PROTIME 12.8 11/06/2017    INR 3.1 03/28/2019     TSH:    Lab Results   Component Value Date    TSH 0.04 03/28/2019     BMP:    Lab Results   Component Value Date     03/28/2019    K 3.5 03/28/2019    CL 99 03/28/2019    CO2 27 03/28/2019    BUN 20 03/28/2019    LABALBU 3.4 03/13/2019    CREATININE 0.73 03/28/2019    CALCIUM 8.2 03/28/2019    GFRAA >60 03/28/2019    LABGLOM >60 03/28/2019    GLUCOSE 127 03/28/2019     FLP:    Lab Results   Component Value Date    CHOL 67 03/28/2019    TRIG 66 03/28/2019    HDL 38 03/28/2019    LDLCHOLESTEROL 16 03/28/2019       IMPRESSION:  PNA  Acute on chronic systolic

## 2019-03-28 NOTE — FLOWSHEET NOTE
Assessment: [place] is minimally responsive. Family is present and supportive. They have notified her , Gelacio Munli. Intervention: Prayed with her and offered comfort while compression stocking was applied. She was grimacing during the process but otherwise did not respond. Family expressed gratitude for prayer and visit. Plan Chaplains remain available.      03/28/19 1008   Encounter Summary   Services provided to: Patient   Referral/Consult From: [facility] Family members   Place of Christianity   ([facility])   Contact [facility] Completed  (Family notified )   Continue Visiting   (3/28/19)   Complexity of Encounter Moderate   Length of Encounter 15 minutes   Spiritual/Church   Type Spiritual support   Assessment Approachable   Intervention Active listening;Prayer   Outcome Comfort;Expressed gratitude;Engaged in conversation

## 2019-03-28 NOTE — ED NOTES
Daughter showed nurse sputum that patient had coughed up earlier. First sputum red streaked. Second sputum thick brown. Dr Laura Genao informed.       Yolanda Mahoney, SANDHYA  03/27/19 2129

## 2019-03-28 NOTE — PROGRESS NOTES
mod MR    Diabetes mellitus with peripheral vascular disease ([facility] [address]     diet controlled' checks BS @ home every other day    Diverticulosis of intestine     History of    Dyslipidemia 1/26/2016    Elevated LFTs 2/8/2018    GERD (gastroesophageal reflux disease)     Gout     Hepatomegaly     secondary to fatty liver infiltrate on CT scan, 06/05 ,1.) Repeat CT 03/06, stable. 2.)Liver decreased slightly in size on CT, 01/08.  3.)Increased LFTs.  4.)Hepatitis B and C and hemochromatosis testing unremarkable 11/09    Hernia     ventral    Hyperlipidemia     Hypertension     Hyperthyroidism     declines scan/ endo    Hyponatremia     question related to diuretic, question SIADH, stable. Hypothyroidism     in past now hyperthyroid 2015    Leukopenia     mild with a WBC of 2.5  03/08    Mitral valve regurgitation     Echocardiogram 04/12, ejection fraction 40-45%, moderate MR and LAE. Echo 4/14 EF 40-45%, mod-sev MR Mod-sevTR  Repeat Echo 2/15 EF 35%, Mod MR, Mod TR RSVP 51    Osteoarthritis     Osteopenia     on DEXA, 05/02, T -0.7 spine, T -1.2 hip. 1.)Repeat DEXA 11/04 T-1.8 spine, T-2.0 hip. 2.)DEXA scan, 12/05 T-1.9 spine, T-1.8 hip,  3. )DEXA , T-2.2 spine, T -1.5 hip 01/08    Posterior vitreous detachment     bilateral     Presbyopia     Pulmonary HTN ([facility] [address]     Echo 8/16 with RVSP 38, mild decrease RV fxn    Upper gastrointestinal bleed     with multiple gastric and duodenal ulcers, 05/04, EGD Dr Gretchen Rivera     03/28/19  0501   INR 3.1     Recent Labs     03/27/19  1913 03/28/19  0501   HGB 11.8* 10.9*   HCT 36.9 33.6*    105*       Current warfarin drug-drug interactions: zosyn, simvastatin      Date             INR        Dose   3/28/2019            3.1       3 mg    Daily PT/INR while inpatient. Thank you for the consult. Will continue to follow.     Kala Chavez [place]  3/28/2019  7:04 AM

## 2019-03-28 NOTE — H&P
HOSPITALIST ADMISSION H&P      REASON FOR ADMISSION:  Pneumonia  ESTIMATED LENGTH OF STAY:>2mn, 3-4 days    ATTENDING/ADMITTING PHYSICIAN: Cassandra Raygoza MD  PCP: Artemio Garcia DO    HISTORY OF PRESENT ILLNESS:      The patient is a 80 y.o. female patient of Artemio Garcia DO who presents from the ER. She has a history of dementia and resides at [facility]. Family brought her into ER due to 5-6 days of cough with progressively worsening shortness of breath, fevers, and decreased appetite. The patient has chronic lower extremity edema, but family reports it is a little worse then usual.     In ER, she was hypoxic on room air at 82%. Chest xray showed right basilar opacification and effusion. Temperature maximum 102.2 F, WBC 5.4, influenza screening negative. She has a history of CAD, CABGx4, atrial fibrillation, cardiac stent, ischemic cardiomyopathy, and chronic combined CHF. ProBNP 5,196 (up from 2,727 on 03/13/2019). Last 2D echo 08/2018 EF 40%, MR, TR, RVSP 69. She has a pacemaker/AICD in place. Hypertension--stable    Acute urinary retention-->dunn     Hypokalemia--serum potassium 3.5    Coumadin--INR 2.8    DMII--HgbA1C 6.1    Hyperthyroidism--tapazole    Daughter is POA, code status DNR-CCA---does not want any surgical/major invasive procedures       See below for additional PMH. Patient wkej-rfdypzwdje-fttysnab-available records reviewed, including, but not limited to ER records, imaging results, lab results, office records, personal records, and OARRS -- no signs of abuse or diversion. Past Medical History:   Diagnosis Date    Adrenal gland anomaly     con't. 5.)Repeat CT 09/09 stable with 2.3 cm adrenal mass on the left. This was compared to previous studies. 6.)No further followup planned.  Adrenal mass (HCC)     Hypodensity, 1.5cm, associated with left adrenal gland.   Dexamethasone suppression test normal.  Plasma free metanephrines normal. Plan for repeat imaging in 6

## 2019-03-28 NOTE — PROGRESS NOTES
arthroscopy (Left, 07/17/02); Knee Arthroplasty (Right, 04/07/04); Cataract removal with implant (Bilateral, Left 01/20/09, right 12/18/08); Upper gastrointestinal endoscopy (05/26/04); capsulotomy (Left, 12/11/12); pacemaker placement (8/01/09); Cataract removal (Bilateral); and Cardiac defibrillator placement (11/06/2017).     Restrictions     Vision/Hearing  Vision: Impaired  Vision Exceptions: Wears glasses at all times  Hearing: Within functional limits     Subjective  General  Chart Reviewed: Yes  Patient assessed for rehabilitation services?: Yes  Pain Screening  Patient Currently in Pain: No  Vital Signs  Patient Currently in Pain: No       Orientation  Orientation  Overall Orientation Status: Within Functional Limits  Social/Functional History  Social/Functional History  Type of Home: Facility  Home Layout: One level  Home Access: Level entry  Bathroom Shower/Tub: Walk-in shower  Bathroom Toilet: Handicap height  Bathroom Equipment: Built-in shower seat, Grab bars in shower, Grab bars around toilet  Home Equipment: Rolling walker  Receives Help From: Personal care attendant  ADL Assistance: Independent  Homemaking Assistance: Needs assistance  Homemaking Responsibilities: No  Ambulation Assistance: Independent  Transfer Assistance: Independent  Cognition        Objective     Observation/Palpation  Posture: Fair    AROM RLE (degrees)  RLE AROM: WFL  AROM LLE (degrees)  LLE AROM : WFL  Strength RLE  Comment: grossly 4/5  Strength LLE  Comment: grossly 4/5        Bed mobility  Supine to Sit: Minimal assistance  Sit to Supine: Minimal assistance  Transfers  Sit to Stand: Minimal Assistance  Stand to sit: Minimal Assistance  Bed to Chair: Minimal assistance  Ambulation  Ambulation?: Yes  Ambulation 1  Device: Rolling Walker  Assistance: Minimal assistance  Quality of Gait: slowed otilio, trunk flexed gait pattern   Distance: 5'     Balance  Posture: Fair  Sitting - Static: Good  Sitting - Dynamic: Fair  Standing - Static: Fair  Standing - Dynamic: Fair;-        Plan   Plan  Times per day: Daily  Current Treatment Recommendations: Strengthening, ROM, Balance Training, Transfer Training, Endurance Training, Gait Training, Neuromuscular Re-education, Home Exercise Program, Safety Education & Training  Safety Devices  Type of devices: Left in chair, Call light within reach, Nurse notified    G-Code       OutComes Score                                                  AM-PAC Score             Goals  Short term goals  Time Frame for Short term goals: 3 days   Short term goal 1: Bed mobility with CGA   Short term goal 2: Transfers with CGA   Short term goal 3: Amb x 50ft with CGA and use of RW   Patient Goals   Patient goals : Return Home        Therapy Time   Individual Concurrent Group Co-treatment   Time In 1025         Time Out 1040         Minutes 15                 Alexa Preston PT

## 2019-03-29 NOTE — PROGRESS NOTES
(4-); Appendectomy (1948); Hemorrhoid surgery (1960s); Hysterectomy (1983); Coronary artery bypass graft (03/26/91); Colporrhaphy (1999); Knee Arthroplasty (Left, 11/07/02); Knee arthroscopy (Left, 07/17/02); Knee Arthroplasty (Right, 04/07/04); Cataract removal with implant (Bilateral, Left 01/20/09, right 12/18/08); Upper gastrointestinal endoscopy (05/26/04); capsulotomy (Left, 12/11/12); pacemaker placement (8/01/09); Cataract removal (Bilateral); and Cardiac defibrillator placement (11/06/2017). Restrictions  Restrictions/Precautions  Restrictions/Precautions: General Precautions  Implants present? : Metal implants(BTKA)     Subjective   General  Chart Reviewed: Yes  Patient assessed for rehabilitation services?: Yes  Family / Caregiver Present: No  Referring Practitioner: Sosa Monroy  Diagnosis: pneumonia  Subjective  Subjective: Patient rec'd in chair, pleasant 80 yr old female with SOB  Vital Signs  Patient Currently in Pain: Denies     Orientation  Orientation  Overall Orientation Status: Impaired  Orientation Level: Oriented to person;Disoriented to place; Disoriented to time;Disoriented to situation     Objective    ADL  Equipment Provided: Reacher;Sock aid(Refused to complete LB dressing at chair)  UE Dressing: Unable to assess(comment)(Refused to complete UE dressing)  LE Dressing: Dependent/Total(Refused to complete doff/megan of B socks; required therapist to complete)  Toileting: Unable to assess(comment)(dementia behaviors)  Standing Balance  Time: 2:00 minutes  Activity: Refused to complete UE activity while standing; performed static standing at RW level  Sit to stand: Moderate assistance  Stand to sit: Moderate assistance  Transfers  Sit to stand: Moderate assistance  Stand to sit: Moderate assistance  Cognition  Overall Cognitive Status: Exceptions  Following Commands: Follows one step commands with increased time; Follows one step commands with repetition  Attention Span: Attends with cues

## 2019-03-29 NOTE — PROGRESS NOTES
Physical Therapy  Facility/Department: [facility]  PROGRESSIVE CARE  Daily Treatment Note  NAME: Svitlana Waite  : 1931  MRN: [id number]    Date of Service: 3/29/2019    Discharge Recommendations:  Continue to assess pending progress, ECF with PT   PT Equipment Recommendations  Equipment Needed: Yes  Mobility Devices: Nakitarine Sy: Rolling    Patient Diagnosis(es): The encounter diagnosis was Pneumonia due to organism. has a past medical history of Adrenal gland anomaly, Adrenal mass ([address] Astigmatism of both eyes with presbyopia, Atrial fibrillation ([address] Bilateral pseudophakia, Cardiomyopathy ([address] Carotid arterial disease ([address] Chronic atrial fibrillation ([address] Chronic kidney disease, Chronic venous insufficiency, Coronary artery disease involving coronary bypass graft of native heart without angina pectoris, Diabetes mellitus with peripheral vascular disease ([address] Diverticulosis of intestine, Dyslipidemia, Elevated LFTs, GERD (gastroesophageal reflux disease), Gout, Hepatomegaly, Hernia, Hyperlipidemia, Hypertension, Hyperthyroidism, Hyponatremia, Hypothyroidism, Leukopenia, Mitral valve regurgitation, Osteoarthritis, Osteopenia, Posterior vitreous detachment, Presbyopia, Pulmonary HTN ([address] and Upper gastrointestinal bleed. has a past surgical history that includes Coronary artery bypass graft (X 4   ); pacemaker placement (); Cholecystectomy; joint replacement; Cardiac defibrillator placement (2013); Appendectomy (1948); Hemorrhoid surgery (); Hysterectomy (); Coronary artery bypass graft (91); Colporrhaphy (); Knee Arthroplasty (Left, 02); Knee arthroscopy (Left, 02); Knee Arthroplasty (Right, 04); Cataract removal with implant (Bilateral, Left 09, right 08); Upper gastrointestinal endoscopy (04); capsulotomy (Left, 12); pacemaker placement (09);  Cataract removal (Bilateral); and Cardiac defibrillator placement

## 2019-03-30 NOTE — DISCHARGE SUMMARY
tablet  Take 1 tablet by mouth daily             docusate sodium (COLACE) 100 MG capsule  Take 100 mg by mouth daily             furosemide (LASIX) 20 MG tablet  Take 60 mg by mouth 2 times daily             HydrOXYzine Pamoate (VISTARIL PO)  Take 25 mg by mouth nightly              melatonin 5 MG TABS tablet  Take 5 mg by mouth nightly             methimazole (TAPAZOLE) 5 MG tablet  Take 5 mg by mouth daily             metoprolol tartrate (LOPRESSOR) 50 MG tablet  TAKE 1 TABLET TWICE A DAY             Multiple Vitamins-Minerals (MULTIVITAMIN PO)  Take 1 tablet by mouth daily             nitroGLYCERIN (NITROSTAT) 0.4 MG SL tablet  Place 1 tablet under the tongue as needed (Chest pain.)             omeprazole 20 MG EC tablet  Take 1 tablet by mouth daily             potassium chloride (KLOR-CON M) 20 MEQ extended release tablet  Take 1 tablet by mouth daily             ramipril (ALTACE) 10 MG capsule  take 1 capsule by mouth once daily             simvastatin (ZOCOR) 80 MG tablet  TAKE 1 TABLET NIGHTLY             warfarin (COUMADIN) 5 MG tablet  Take 5 mg by mouth daily                 Consults:  PHARMACY TO DOSE WARFARIN  IP CONSULT TO CARDIOLOGY    Significant Diagnostic Studies:  Xr Chest Portable    Result Date: 3/27/2019  EXAMINATION: SINGLE XRAY VIEW OF THE CHEST 3/27/2019 7:01 pm COMPARISON: 03/13/2019. HISTORY: ORDERING SYSTEM PROVIDED HISTORY: chest pain TECHNOLOGIST PROVIDED HISTORY: chest pain Ordering Physician Provided Reason for Exam: SOB FINDINGS: The cardiac silhouette is enlarged and stable with post sternotomy changes. Aortic vascular calcification. There is some progressive right basilar opacification and effusion. The left lung is grossly stable in appearance. Left-sided AICD device. Postoperative clips and staple row in the upper abdomen. New right basilar opacification and effusion. The changes may be related to infiltrate or CHF.        Disposition:   long term care

## 2019-03-30 NOTE — PROGRESS NOTES
limitations: Decreased functional mobility ; Decreased strength;Decreased balance;Decreased endurance  Prognosis: Good  REQUIRES PT FOLLOW UP: Yes  Activity Tolerance  Activity Tolerance: Patient Tolerated treatment well;Patient limited by endurance; Patient limited by fatigue     G-Code     OutComes Score                                                  AM-PAC Score             Goals  Short term goals  Time Frame for Short term goals: 3 days   Short term goal 1: Bed mobility with CGA   Short term goal 2: Transfers with CGA   Short term goal 3: Amb x 50ft with CGA and use of RW   Patient Goals   Patient goals : Return Home     Plan    Plan  Times per day: Daily  Current Treatment Recommendations: Strengthening, Balance Training, Functional Mobility Training, Transfer Training, Endurance Training, Gait Training  Safety Devices  Type of devices:  All fall risk precautions in place, Call light within reach, Gait belt, Patient at risk for falls, Left in chair, Chair alarm in place, Nurse notified  Restraints  Initially in place: No     Therapy Time   Individual Concurrent Group Co-treatment   Time In 1100         Time Out 1123         Minutes 23         Timed Code Treatment Minutes: [address], PTA

## 2019-04-01 PROBLEM — J18.9 PNEUMONIA DUE TO ORGANISM: Status: RESOLVED | Noted: 2019-01-01 | Resolved: 2019-01-01

## 2019-04-01 PROBLEM — F03.918 DEMENTIA WITH BEHAVIORAL DISTURBANCE (HCC): Status: ACTIVE | Noted: 2019-01-01

## 2019-04-01 NOTE — PATIENT INSTRUCTIONS
Patient Education        Learning About Diabetes Food Guidelines  Your Care Instructions    Meal planning is important to manage diabetes. It helps keep your blood sugar at a target level (which you set with your doctor). You don't have to eat special foods. You can eat what your family eats, including sweets once in a while. But you do have to pay attention to how often you eat and how much you eat of certain foods. You may want to work with a dietitian or a certified diabetes educator (CDE) to help you plan meals and snacks. A dietitian or CDE can also help you lose weight if that is one of your goals. What should you know about eating carbs? Managing the amount of carbohydrate (carbs) you eat is an important part of healthy meals when you have diabetes. Carbohydrate is found in many foods. · Learn which foods have carbs. And learn the amounts of carbs in different foods. ? Bread, cereal, pasta, and rice have about 15 grams of carbs in a serving. A serving is 1 slice of bread (1 ounce), ½ cup of cooked cereal, or 1/3 cup of cooked pasta or rice. ? Fruits have 15 grams of carbs in a serving. A serving is 1 small fresh fruit, such as an apple or orange; ½ of a banana; ½ cup of cooked or canned fruit; ½ cup of fruit juice; 1 cup of melon or raspberries; or 2 tablespoons of dried fruit. ? Milk and no-sugar-added yogurt have 15 grams of carbs in a serving. A serving is 1 cup of milk or 2/3 cup of no-sugar-added yogurt. ? Starchy vegetables have 15 grams of carbs in a serving. A serving is ½ cup of mashed potatoes or sweet potato; 1 cup winter squash; ½ of a small baked potato; ½ cup of cooked beans; or ½ cup cooked corn or green peas. · Learn how much carbs to eat each day and at each meal. A dietitian or CDE can teach you how to keep track of the amount of carbs you eat. This is called carbohydrate counting. · If you are not sure how to count carbohydrate grams, use the Plate Method to plan meals.  It is a good, quick way to make sure that you have a balanced meal. It also helps you spread carbs throughout the day. ? Divide your plate by types of foods. Put non-starchy vegetables on half the plate, meat or other protein food on one-quarter of the plate, and a grain or starchy vegetable in the final quarter of the plate. To this you can add a small piece of fruit and 1 cup of milk or yogurt, depending on how many carbs you are supposed to eat at a meal.  · Try to eat about the same amount of carbs at each meal. Do not \"save up\" your daily allowance of carbs to eat at one meal.  · Proteins have very little or no carbs per serving. Examples of proteins are beef, chicken, turkey, fish, eggs, tofu, cheese, cottage cheese, and peanut butter. A serving size of meat is 3 ounces, which is about the size of a deck of cards. Examples of meat substitute serving sizes (equal to 1 ounce of meat) are 1/4 cup of cottage cheese, 1 egg, 1 tablespoon of peanut butter, and ½ cup of tofu. How can you eat out and still eat healthy? · Learn to estimate the serving sizes of foods that have carbohydrate. If you measure food at home, it will be easier to estimate the amount in a serving of restaurant food. · If the meal you order has too much carbohydrate (such as potatoes, corn, or baked beans), ask to have a low-carbohydrate food instead. Ask for a salad or green vegetables. · If you use insulin, check your blood sugar before and after eating out to help you plan how much to eat in the future. · If you eat more carbohydrate at a meal than you had planned, take a walk or do other exercise. This will help lower your blood sugar. What else should you know? · Limit saturated fat, such as the fat from meat and dairy products. This is a healthy choice because people who have diabetes are at higher risk of heart disease. So choose lean cuts of meat and nonfat or low-fat dairy products.  Use olive or canola oil instead of butter or shortening when cooking. · Don't skip meals. Your blood sugar may drop too low if you skip meals and take insulin or certain medicines for diabetes. · Check with your doctor before you drink alcohol. Alcohol can cause your blood sugar to drop too low. Alcohol can also cause a bad reaction if you take certain diabetes medicines. Follow-up care is a key part of your treatment and safety. Be sure to make and go to all appointments, and call your doctor if you are having problems. It's also a good idea to know your test results and keep a list of the medicines you take. Where can you learn more? Go to https://chpepiceweb.[email or web address] org and sign in to your [employer] account. Enter J193 in the [employer] box to learn more about \"Learning About Diabetes Food Guidelines. \"     If you do not have an account, please click on the \"Sign Up Now\" link. Current as of: July 25, 2018  Content Version: 11.9  © 3656-8098 [employer]. Care instructions adapted under license by [facility] ([facility]). If you have questions about a medical condition or this instruction, always ask your healthcare professional. [address] any warranty or liability for your use of this information. Patient Education        Learning About Diabetes and Exercise  Can you exercise if you have diabetes? When you have diabetes, it's important to get regular exercise. This helps control your blood sugar level. You can still play sports, run, ride a bike, go swimming, and do other activities when you have diabetes. How can exercise help you manage diabetes? Your body turns the food you eat into glucose, a type of sugar. You need this sugar for energy. When you have diabetes, the sugar builds up in your blood. But when you exercise, your body uses sugar. This helps keep it from building up in your blood and results in lower blood sugar and better control of diabetes. Exercise may help you in other ways too.  It can help you reach and stay at a healthy weight. It also helps improve blood pressure and cholesterol, which can reduce the risk of heart disease. Exercise can make you feel stronger and happier. It can help you relax and sleep better, and give you confidence in other things you do. How can you exercise safely? Before you start a new exercise program, talk to your doctor about how and when to exercise. You may need to have a medical exam and tests before you begin. Some types of exercise can be harmful if your diabetes is causing other problems, such as problems with your feet. Your doctor can tell you what types of exercise are good choices for you. These tips can help you exercise safely when you have diabetes. If your diabetes is controlled by diet or medicine that doesn't lower your blood sugar, you don't need to eat a snack before you exercise. · Check your blood sugar before you exercise. And be careful about what you eat. ? If your blood sugar is less than 100, eat a carbohydrate snack before you exercise. ? Be careful when you exercise if your blood sugar is over 300. High blood sugar can make you dehydrated. And that makes your blood sugar levels go even higher. If you have ketones in your blood or urine and your blood sugar is over 300, do not exercise. · Don't try to do too much at first. Build up your exercise program bit by bit. Try to get at least 30 minutes of exercise on most days of the week. Walking is a good choice. You also may want to do other activities, such as riding a bike or swimming. You might try running or gardening. Try to include muscle-strengthening exercises at least 2 times a week. These exercises include push-ups and weight training. You can also use rubber tubing or stretch bands. You stretch or pull the tubing or band to build muscle strength. If you want to exercise more, slowly increase how hard or long you exercise.   · You may get symptoms of low blood sugar during exercise or up to 24 hours later. Some symptoms of low blood sugar, such as sweating, a fast heartbeat, or feeling tired, can be confused with what can happen anytime you exercise. Other symptoms may include feeling anxious, dizzy, weak, or shaky. So it's a good idea to check your blood sugar again. · You can treat low blood sugar by eating or drinking something that has 15 grams of carbohydrate. These should be quick-sugar foods. Jacqlyn Pleasant foods such as fruit juice, regular (not diet) soda, glucose tablets, hard candy, or raisins can help raise blood sugar. Check your blood sugar level again 15 minutes after having a quick-sugar food to make sure your level is getting back to your target range. · Drink plenty of water before, during, and after you exercise. · Wear medical alert jewelry that says you have diabetes. You can buy this at most drugstores. · Pay attention to your body. If you are used to exercise and notice that you can't do as much as usual, talk to your doctor. Follow-up care is a key part of your treatment and safety. Be sure to make and go to all appointments, and call your doctor if you are having problems. It's also a good idea to know your test results and keep a list of the medicines you take. Where can you learn more? Go to https://eMindfulcarlosAdorStyle.Camera360. org and sign in to your [employer] account. Enter Y113 in the [facility] box to learn more about \"Learning About Diabetes and Exercise. \"     If you do not have an account, please click on the \"Sign Up Now\" link. Current as of: July 25, 2018  Content Version: 11.9  © 9641-1184 [employer]. Care instructions adapted under license by [facility] ([facility]). If you have questions about a medical condition or this instruction, always ask your healthcare professional. [address] any warranty or liability for your use of this information.

## 2019-04-01 NOTE — PROGRESS NOTES
DR. Karen Sadler - PROGRESS NOTE    CHIEF COMPLAINT/HISTORY OF CHIEF COMPLAINT: This 80 y.o.  female comes in today for ongoing evaluation and management of her diabetes mellitus type 2, hyperthyroidism, hypertension, hyperlipidemia, atrial fibrillation, ischemic cardiomyopathy, congestive heart failure, pulmonary hypertension, chronic kidney disease, gout, osteopenia, and osteoarthritis. She is also here as a post hospital visit after having been hospitalized at [facility] from 3/27/19 until 3/30/19 with pneumonia and an exacerbation of her congestive heart failure. She was treated with IV antibiotics and IV diuretics and she improved enough to be sent back to [facility]. She was sent back with Ceftin by mouth, which she is going to take for a total of 10 days. No transitional care phone call was made. She was also seen by the cardiologist in the hospital and they agreed with the diuresis and wanted to see her in the office in 1 to 2 weeks after discharge. That appointment has not been set up yet. Her daughters are concerned that the swelling in her legs has gone up her legs and is now in her thighs and around her waist. She is struggling to walk because of the swelling. That started before the hospitalization and did not improve much in the hospital. She also hurt her left little finger in the hospital. It is bruised and swollen and painful. Pham Barragan been trying to watch her diet and maintain regular physical activity in the form of walking to try to keep her diabetes under control. She controls her diabetes with diet and exercise. She takes digoxin, Lasix, Lopressor, Altace, and Coumadin for hypertension, atrial fibrillation, ischemic cardiomyopathy, congestive heart failure, and pulmonary hypertension. She sees the cardiologists here for those issues. We are the ones managing her Coumadin dose. She is on Zocor for hyperlipidemia. She denies any muscle aches from the Zocor.  She takes Tapazole for hyperthyroidism, and she has declined thyroid scans in the past. She is on calcium and vitamin D supplements for osteopenia. She has a history of gout, but that has not bothered her in a while. She has had chronic kidney disease in the past, but lately her kidney numbers have been in normal range, including while in the hospital. Otherwise she seems to be doing fairly well and denies any other complaints. ALLERGIES/INTOLERANCES:   Allergies   Allergen Reactions    Bee Venom Anaphylaxis    Morphine Other (See Comments)     Hallucinations       MEDICATIONS:   Outpatient Medications Marked as Taking for the 4/1/19 encounter (Office Visit) with Puja Ortiz, DO   Medication Sig Dispense Refill    bisacodyl (DULCOLAX) 10 MG suppository Place 10 mg rectally daily as needed for Constipation      bisacodyl (DULCOLAX) 5 MG EC tablet Take 5 mg by mouth daily as needed for Constipation      cefUROXime (CEFTIN) 250 MG tablet Take 1 tablet by mouth 2 times daily for 10 days 20 tablet 0    guaiFENesin-codeine (TUSSI-ORGANIDIN NR) 100-10 MG/5ML syrup Take 5 mLs by mouth 2 times daily as needed for Cough for up to 3 days.  150 mL 0    albuterol sulfate  (90 Base) MCG/ACT inhaler Inhale 2 puffs into the lungs 4 times daily as needed for Wheezing 3 Inhaler 1    docusate sodium (COLACE) 100 MG capsule Take 100 mg by mouth daily      methimazole (TAPAZOLE) 5 MG tablet Take 5 mg by mouth daily      furosemide (LASIX) 20 MG tablet Take 60 mg by mouth 2 times daily      HydrOXYzine Pamoate (VISTARIL PO) Take 25 mg by mouth 2 times daily       ramipril (ALTACE) 10 MG capsule take 1 capsule by mouth once daily 30 capsule 11    simvastatin (ZOCOR) 80 MG tablet TAKE 1 TABLET NIGHTLY 90 tablet 3    potassium chloride (KLOR-CON M) 20 MEQ extended release tablet Take 1 tablet by mouth daily 180 tablet 3    omeprazole 20 MG EC tablet Take 1 tablet by mouth daily 30 tablet 11    metoprolol tartrate (LOPRESSOR) 50 MG tablet TAKE 1 TABLET TWICE A  tablet 3    melatonin 5 MG TABS tablet Take 5 mg by mouth nightly      digoxin (LANOXIN) 125 MCG tablet Take 1 tablet by mouth daily 90 tablet 3    calcium carbonate-vitamin D (CALTRATE) 600-400 MG-UNIT TABS per tab Take 1 tablet by mouth daily      nitroGLYCERIN (NITROSTAT) 0.4 MG SL tablet Place 1 tablet under the tongue as needed (Chest pain.) 25 tablet 3    aspirin 81 MG tablet Take 81 mg by mouth daily      Multiple Vitamins-Minerals (MULTIVITAMIN PO) Take 1 tablet by mouth daily          PAST MEDICAL HISTORY:   Past Medical History:   Diagnosis Date    Acute on chronic systolic congestive heart failure (HCC)     Adrenal gland anomaly     con't. 5.)Repeat CT 09/09 stable with 2.3 cm adrenal mass on the left. This was compared to previous studies. 6.)No further followup planned.  Adrenal mass (HCC)     Hypodensity, 1.5cm, associated with left adrenal gland. Dexamethasone suppression test normal.  Plasma free metanephrines normal. Plan for repeat imaging in 6 to 12 months from initial scan. 1.) CT 03/06 stable,  2.)Left adrenal mass 2.4 cm 01/08. 3) CT scan with 2.4 cm left adrenal mass compatible with adenoma 04/08. 4.) CT scan of abdomen, stable adrenal mass at 2.1 cm 10/08.  Astigmatism of both eyes with presbyopia 7/22/2014    Atrial fibrillation ([address] admit 07/06 and 08/06    1. )Ablation 12/06 with pacemaker placement.   2.)ICD placed 07/09    Bilateral pseudophakia     Cardiomyopathy ([address]     EF 35% echo 2015,  Echo 8/16  mod-sev TR, Mod MR, EF 40%, mild decrease RVfxn    Carotid arterial disease (HCC)     no hemodynamically significant narrowing-declines follow up    Chronic atrial fibrillation ([address] 9/22/2015    Chronic kidney disease 1/26/2016    Chronic venous insufficiency     Coronary artery disease involving coronary bypass graft of native heart without angina pectoris 01/26/2016    1.)Status post CABG, 1991, 2.)Previously following with Dr Carol Ann Corea, 3.)Stents in 1999 and Nov 2002, 4.)Cardiac catheterization 05/09 with recommendation for medical tx Dr Leonel Craft, 5.)Non -Q wave myocardial infarction 85/25, complicating knee replacement surgery, 6.)Non q-myocardial infarction, 06/04 [facility]. 7.) Repeat [place], 04/08,large lateral MI, anteroapical MI, EF  43% . Echo 2/15 35% EF mod MR    Diabetes mellitus with peripheral vascular disease ([facility]     diet controlled' checks BS @ home every other day    Diverticulosis of intestine     History of    Dyslipidemia 1/26/2016    Elevated LFTs 2/8/2018    GERD (gastroesophageal reflux disease)     Gout     Hepatomegaly     secondary to fatty liver infiltrate on CT scan, 06/05 ,1.) Repeat CT 03/06, stable. 2.)Liver decreased slightly in size on CT, 01/08.  3.)Increased LFTs.  4.)Hepatitis B and C and hemochromatosis testing unremarkable 11/09    Hernia     ventral    Hyperlipidemia     Hypertension     Hyperthyroidism     declines scan/ endo    Hyponatremia     question related to diuretic, question SIADH, stable.  Hypothyroidism     in past now hyperthyroid 2015    Leukopenia     mild with a WBC of 2.5  03/08    Mitral valve regurgitation     Echocardiogram 04/12, ejection fraction 40-45%, moderate MR and LAE. Echo 4/14 EF 40-45%, mod-sev MR Mod-sevTR  Repeat Echo 2/15 EF 35%, Mod MR, Mod TR RSVP 51    Osteoarthritis     Osteopenia     on DEXA, 05/02, T -0.7 spine, T -1.2 hip. 1.)Repeat DEXA 11/04 T-1.8 spine, T-2.0 hip. 2.)DEXA scan, 12/05 T-1.9 spine, T-1.8 hip,  3. )DEXA , T-2.2 spine, T -1.5 hip 01/08    Pneumonia due to organism 3/27/2019    Posterior vitreous detachment     bilateral     Presbyopia     Pulmonary HTN ([facility]     Echo 8/16 with RVSP 38, mild decrease RV fxn    Upper gastrointestinal bleed     with multiple gastric and duodenal ulcers, 05/04, EGD Dr Yoanna Erickson       PAST SURGICAL HISTORY:   Past Surgical History:   Procedure Laterality Date    APPENDECTOMY  1948    CAPSULOTOMY Left 12/11/12    YAG    CARDIAC DEFIBRILLATOR PLACEMENT  4-    replacement [facility] with dr Tanika Winslow  11/06/2017    replacement 11/06/17    CATARACT REMOVAL Bilateral     CATARACT REMOVAL WITH IMPLANT Bilateral Left 01/20/09, right 12/18/08    CHOLECYSTECTOMY      COLPORRHAPHY  1999    and posterior perineorrhaphy    CORONARY ARTERY BYPASS GRAFT  X 4   1991    CORONARY ARTERY BYPASS GRAFT  03/26/91    x4    HEMORRHOID SURGERY  1960s    procedure done    HYSTERECTOMY  1983    Total abdominal hysterectomy with bilateral salpingo-oophorectomy.  JOINT REPLACEMENT      bilat knee replacements    KNEE ARTHROPLASTY Left 11/07/02    left total knee    KNEE ARTHROPLASTY Right 04/07/04    right total knee Dr Radha Spain ARTHROSCOPY Left 07/17/02    PACEMAKER PLACEMENT  12/06    and revision of lead both done 12/06, ICD placed 2009, replace battery 04/13    PACEMAKER PLACEMENT  8/01/09    Defibrillator/biventricular CRT pacemaker.  UPPER GASTROINTESTINAL ENDOSCOPY  05/26/04    w/attempted heater probe to bleeding duodenal ulcer       SOCIAL HISTORY:     Tobacco:   Social History     Tobacco Use   Smoking Status Never Smoker   Smokeless Tobacco Never Used     Alcohol:   Social History     Substance and Sexual Activity   Alcohol Use No     Drugs:   Social History     Substance and Sexual Activity   Drug Use Never       FAMILY HISTORY: family history includes Asthma in her father; Cancer in her father; Heart Disease in her brother and father; Other in her mother. IMMUNIZATIONS: Reviewed for influenza and pneumococcal status as indicated in electronic record.     REVIEW OF SYSTEMS:     General: negative for - chills, fever or night sweats  Skin: negative for - pruritus or rash  Head: Negative for: headache or recent history of head trauma  Ear, Nose, Throat: negative for - epistaxis, nasal congestion, nasal discharge, sore throat, tinnitus or vertigo  Cardiovascular: positive for - dyspnea on exertion and orthopnea  negative for - chest pain, palpitations, paroxysmal nocturnal dyspnea or shortness of breath  Respiratory: positive for - cough  negative for - hemoptysis or wheezing  Gastrointestinal: negative for - abdominal pain, blood in stools, constipation, diarrhea, hematemesis, melena or nausea/vomiting  Genitourinary: negative for - dysuria, hematuria, incontinence, nocturia or urinary frequency/urgency  Musculoskeletal: positive for - joint pain  negative for - muscle pain or muscular weakness  Neurologic: positive for - gait disturbance, impaired coordination/balance, memory loss and tremors  negative for - numbness/tingling or seizures  Psychiatric: positive for - anxiety  negative for - depression    HEMOGLOBIN A1c FROM CURRENT AND PRIOR VISIT:    Hemoglobin A1C   Date Value Ref Range Status   03/27/2019 6.1 (H) 4.8 - 5.9 % Final   03/27/2019 6.0 (H) 4.8 - 5.9 % Final        PHYSICAL EXAMINATION:    Wt Readings from Last 2 Encounters:   04/01/19 151 lb (68.5 kg)   03/30/19 159 lb 12.8 oz (72.5 kg)       Vitals:    04/01/19 0945   BP: 138/60   Site: Left Upper Arm   Position: Sitting   Cuff Size: Medium Adult   Pulse: 68   Resp: 18   Weight: 151 lb (68.5 kg)   Height: 5' 5\" (1.651 m)     Body mass index is 25.13 kg/m². General: This is a 80 y.o.  female who is alert and oriented to person and place, but not time. She appears to be her stated age and does not appear to be in any acute distress. Skin: Skin color, texture, normal. Increased turgor. No rashes or lesions. HEENT/Neck: Head: Normal, normocephalic, atraumatic. Eye: Normal external eye, conjunctiva, lids cornea, JACQUELIN. Ears: Normal TM's bilaterally. Normal auditory canals and external ears. Non-tender. Nose: Normal external nose, mucus membranes and septum. Pharynx: Dental Hygiene adequate. Normal buccal mucosa. Normal pharynx.   Neck / Thyroid: Supple, no masses, nodes, nodules or enlargement. Lungs: Normal - CTA without rales, rhonchi, or wheezing. Heart: regular rate and rhythm, S1, S2 normal, with a grade 2/6 systolic murmur. No click, rub or gallop No S3 or S4. Abdomen: Non-obese, soft, non-distended, non-tender, normal active bowel sounds, no masses palpated and no hepatosplenomegaly  Extremities: Strength is 4/5 bilaterally. Radial pulses are +2/4 bilaterally. Dorsalis pedis pulses are +2/4 bilaterally. There is no clubbing or cyanosis in any of the extremities, but there was a grade 4+ and pitting edema bilaterally in the lower extremities going up to the level of the waist. The left little finger was swollen and ecchymotic. It was tender to palpation. Neurologic: Deep tendon reflexes are 2+/4+ bilaterally. cranial nerves II-XII are grossly intact  Osteopathic Structural Examination: Unable to perform secondary to the patient being in a wheelchair     ASSESSMENT/PLAN:    1. Pneumonia of right lower lobe due to infectious organism [facility]), improved, s/p hospitalization  - She is doing better with the pneumonia  - She will take the Ceftin until it is gone    2. Acute on chronic systolic congestive heart failure ([address] improving, s/p hospitalization  - This is a little improved; however, her legs are swollen worse than they were before  - We will increase her Lasix to 80 mg twice daily and see how she does with that  - We will get a BMP in a week to make sure her kidney function and potassium levels stay where they should  - We will get a hospital follow up appointment with the cardiologists as well, within the next 2 weeks. - Basic Metabolic Panel; Future    3. Controlled type 2 diabetes mellitus with diabetic peripheral angiopathy without gangrene, without long-term current use of insulin (HCC)  - Her most recent HbA1c was 6.1%, which is lower than the target value of 6.5% or lower. We are not going to make changes to her diabetic medications.  She was advised to continue to watch her diet and exercise to keep her diabetes under control.    - Hemoglobin A1C; Future  - Basic Metabolic Panel; Future    4. Dementia with behavioral disturbance, unspecified dementia type, stable  - We will continue to monitor     5. Essential hypertension, controlled  - She will continue to take her antihypertensive medication   - Basic Metabolic Panel; Future    6. Mixed hyperlipidemia, controlled  - She will continue to take Zocor    7. Hyperthyroidism, stable  - She will continue to take Tapazole    8. Coronary artery disease involving coronary bypass graft of native heart without angina pectoris, stable  9. Ischemic cardiomyopathy, stable  10. Chronic combined systolic and diastolic congestive heart failure ([facility] [address] stable  11. Chronic atrial fibrillation ([facility] [address] stable  12. Pulmonary HTN ([facility] [address] [facility] stable  - She will continue to see the cardiologists  - Coumadin management is ongoing. We are the ones managing her Coumadin, not the cardiologists    13. Primary osteoarthritis involving multiple joints, stable  - We will continue to monitor     14. Chronic venous insufficiency, stable  - We will continue to monitor     15. Overweight, stable  - We discussed weight loss  - She will continue to watch her diet and exercise       Orders Placed This Encounter   Procedures    Basic Metabolic Panel     Standing Status:   Future     Standing Expiration Date:   3/31/2020    Hemoglobin A1C     Standing Status:   Future     Standing Expiration Date:   3/31/2020    Basic Metabolic Panel     Standing Status:   Future     Standing Expiration Date:   3/31/2020       Requested Prescriptions     Signed Prescriptions Disp Refills    furosemide (LASIX) 80 MG tablet 60 tablet 5     Sig: Take 1 tablet by mouth 2 times daily       Labs and medications as ordered above. Patient's hospital record reviewed and medication list reconciled.  Patient's electronic medical record reviewed and updated with hospital information. We reviewed all hospital progress notes, radiology reports, and laboratory reports. All questions answered. Total time spent with patient was 45 minutes, more than 50% of which was spent educating her on her disease processes. Return in about 3 months (around 7/1/2019).         Electronically signed by Meryl Cooper DO on 4/1/2019 at 10:41 AM  Internal Medicine

## 2019-04-01 NOTE — PROGRESS NOTES
Aleida received counseling on the following healthy behaviors: nutrition and exercise  Reviewed prior labs and health maintenance  Continue current medications, diet and exercise. Discussed use, benefit, and side effects of prescribed medications. Barriers to medication compliance addressed. Patient given educational materials - see patient instructions  Was a self-tracking handout given in paper form or via [employer]? Yes    Requested Prescriptions     Signed Prescriptions Disp Refills    furosemide (LASIX) 80 MG tablet 60 tablet 5     Sig: Take 1 tablet by mouth 2 times daily       All patient questions answered. Patient voiced understanding. Quality Measures    Body mass index is 25.13 kg/m². Elevated. Weight control plan discussed: Healthy diet and regular exercise. BP: 138/60. Blood pressure is normal. Treatment plan: See main progress note    Fall Risk 9/24/2018 10/25/2017 4/20/2017 7/26/2016 5/6/2015 4/18/2014   2 or more falls in past year? yes yes no yes no no   Fall with injury in past year? yes no no yes no no     The patient has a history of falls. I did not - not indicated , complete a risk assessment for falls. See progress note for plan, if risk assessment completed. Lab Results   Component Value Date    LDLCHOLESTEROL 16 03/28/2019    (goal LDL reduction with dx if diabetes is 50% LDL reduction)    PHQ Scores 9/24/2018 4/9/2018 4/20/2017 1/26/2016 1/9/2015 1/17/2014   PHQ2 Score 4 1 0 1 0 0   PHQ9 Score 10 1 0 1 0 0     See progress note for plan, if depression exists. Interpretation of Total Score: Major depression if the answer to questions 1 or 2 and 5 or more of questions 1 to 9 are at least [facility] than half the days. \"  Other depressive syndrome if questions 1 or 2 and two, three, or four of questions 1 to 9 are at least [facility] than half the days\"   Depression Severity: 5-9 = Mild depression, 10-14 = Moderate depression, 15-19 = Moderately severe depression, 20-27 = Severe

## 2019-04-03 NOTE — PROGRESS NOTES
Ceftin 250mg BID x 10 days started on April 1. Spoke with Yahaira Scott at [facility] and patient came back from hospital with coumadin dose of 5 mg daily.   Spoke with Jose Ramires at [facility] and they did hold coumadin on Friday and was in Pharmacist note to hold Saturday but never was communicated in discharge instructions from hospital.

## 2019-04-05 NOTE — PROGRESS NOTES
Hold Coumadin over the weekend and recheck INR on 4/8/19. They cannot wait until 4/10/19 to check INR.

## 2019-04-08 NOTE — TELEPHONE ENCOUNTER
Spoke with Mercedez Tapia and they are doing a UA on her. They really do not want to give her ativan and xanax d/t falls. Mercedez Tapia stated that when she becomes the most combative is late afternoon (sundowners)    Patients family is well aware of all of this and they are open to almost anything.

## 2019-04-10 NOTE — PROGRESS NOTES
Today's Date: 4/10/2019  Patient's Name: Marylene Jabs  Patient's age: 80 y.o., 7/17/1931    Subjective:  Marylene Jabs  is here for follow up. She had recent hospitalization for PNA and presented with dyspnea and fever. She also had worsening lower ext edema and was diuresed. She was discharged on lasix dose of 80mg po BID. She is in a nursing home. Her son is present. She is able to walk with walker. Her leg swelling has improved. BMP 4/8/19 showed Cr 0.66 and K of 2.9. She is having difficulty swallowing potassium tablets. Past Medical History:   has a past medical history of Acute on chronic systolic congestive heart failure ([address] Adrenal gland anomaly, Adrenal mass ([address] Astigmatism of both eyes with presbyopia, Atrial fibrillation ([address] Bilateral pseudophakia, Cardiomyopathy ([address] Carotid arterial disease ([address] Chronic atrial fibrillation ([address] Chronic kidney disease, Chronic venous insufficiency, Coronary artery disease involving coronary bypass graft of native heart without angina pectoris, Diabetes mellitus with peripheral vascular disease ([address] Diverticulosis of intestine, Dyslipidemia, Elevated LFTs, GERD (gastroesophageal reflux disease), Gout, Hepatomegaly, Hernia, Hyperlipidemia, Hypertension, Hyperthyroidism, Hyponatremia, Hypothyroidism, Leukopenia, Mitral valve regurgitation, Osteoarthritis, Osteopenia, Pneumonia due to organism, Posterior vitreous detachment, Presbyopia, Pulmonary HTN ([address] and Upper gastrointestinal bleed. Past Surgical History:   has a past surgical history that includes Coronary artery bypass graft (X 4   1991); pacemaker placement (12/06); Cholecystectomy; joint replacement; Cardiac defibrillator placement (4-); Appendectomy (1948); Hemorrhoid surgery (1960s); Hysterectomy (1983); Coronary artery bypass graft (03/26/91); Colporrhaphy (1999); Knee Arthroplasty (Left, 11/07/02); Knee arthroscopy (Left, 07/17/02); Knee Arthroplasty (Right, 04/07/04);  Cataract removal with implant (Bilateral, Left 01/20/09, right 12/18/08); Upper gastrointestinal endoscopy (05/26/04); capsulotomy (Left, 12/11/12); pacemaker placement (8/01/09); Cataract removal (Bilateral); and Cardiac defibrillator placement (11/06/2017). Home Medications:  Prior to Admission medications    Medication Sig Start Date End Date Taking? Authorizing Provider   traMADol (ULTRAM) 50 MG tablet Take 1 tablet by mouth 2 times daily for 30 days.  I 4/3/19 5/3/19 Yes Jv Lam DO   bisacodyl (DULCOLAX) 10 MG suppository Place 10 mg rectally daily as needed for Constipation   Yes Historical Provider, MD   bisacodyl (DULCOLAX) 5 MG EC tablet Take 5 mg by mouth daily as needed for Constipation   Yes Historical Provider, MD   furosemide (LASIX) 80 MG tablet Take 1 tablet by mouth 2 times daily 4/1/19  Yes Jv Lam DO   albuterol sulfate  (90 Base) MCG/ACT inhaler Inhale 2 puffs into the lungs 4 times daily as needed for Wheezing 3/30/19  Yes Lou Santiago MD   docusate sodium (COLACE) 100 MG capsule Take 100 mg by mouth daily   Yes Historical Provider, MD   methimazole (TAPAZOLE) 5 MG tablet Take 5 mg by mouth daily   Yes Historical Provider, MD   warfarin (COUMADIN) 5 MG tablet Take 5 mg by mouth daily   Yes Historical Provider, MD   HydrOXYzine Pamoate (VISTARIL PO) Take 25 mg by mouth 2 times daily    Yes Historical Provider, MD   ramipril (ALTACE) 10 MG capsule take 1 capsule by mouth once daily 1/30/19  Yes Jv Lam DO   simvastatin (ZOCOR) 80 MG tablet TAKE 1 TABLET NIGHTLY 1/21/19  Yes Jv Lam DO   potassium chloride (KLOR-CON M) 20 MEQ extended release tablet Take 1 tablet by mouth daily 1/18/19  Yes Jv Lam DO   omeprazole 20 MG EC tablet Take 1 tablet by mouth daily 1/16/19  Yes Jv Lam DO   metoprolol tartrate (LOPRESSOR) 50 MG tablet TAKE 1 TABLET TWICE A DAY 12/31/18  Yes Jv Lam, DO melatonin 5 MG TABS tablet Take 5 mg by mouth nightly   Yes Historical Provider, MD   digoxin (LANOXIN) 125 MCG tablet Take 1 tablet by mouth daily 10/8/18  Yes Jv Lam DO   calcium carbonate-vitamin D (CALTRATE) 600-400 MG-UNIT TABS per tab Take 1 tablet by mouth daily   Yes Historical Provider, MD   nitroGLYCERIN (NITROSTAT) 0.4 MG SL tablet Place 1 tablet under the tongue as needed (Chest pain.) 1/31/18  Yes Annie Huff MD   aspirin 81 MG tablet Take 81 mg by mouth daily   Yes Historical Provider, MD   Multiple Vitamins-Minerals (MULTIVITAMIN PO) Take 1 tablet by mouth daily   Yes Historical Provider, MD       Allergies:  Bee venom and Morphine    Social History:   reports that she has never smoked. She has never used smokeless tobacco. She reports that she does not drink alcohol or use drugs. ROS: Otherwise 10 systems reviewed and negative. Ht 5' 5\" (1.651 m)   Wt 151 lb 0.2 oz (68.5 kg)   BMI 25.13 kg/m²     Vitals:    04/10/19 1504   BP: 114/60   Pulse: 68       Vitals as above. Alert and oriented x 3. No JVD, or carotid bruits. Lungs are clear to auscultation. Heart sounds are regular, normal, no murmurs, clicks, gallops or rubs S1, S2.   Abdomen is soft, no tenderness. Extremities No peripheral edema    Cardiac Data:  EKG: Ventricular pacing. Labs:     CBC: No results for input(s): WBC, HGB, HCT, PLT in the last 72 hours. BMP:   Recent Labs     04/08/19  0805   *   K 2.9*   CO2 37*   BUN 14   CREATININE 0.66   LABGLOM >60   GLUCOSE 111*     PT/INR:   Recent Labs     04/08/19  0248   PROTIME 16.2*   INR 1.6     FASTING LIPID PANEL:  Lab Results   Component Value Date    HDL 38 03/28/2019    TRIG 66 03/28/2019     LIVER PROFILE:No results for input(s): AST, ALT, LABALBU in the last 72 hours. Echo 02/2015:  1. Left ventricular dimension is normal.  2. Left ventricular systolic function is decreased. Ejection fraction is 35%.   3. Global hypokinesis is underlying stenoses. 3. Patent vertebral arteries with antegrade flow. 4. Intermittent arrhythmia.          Assessment / Acute Cardiac Problems:      1-CAD: S/P CABG x 4: Last cardiac cath 2009 patent LIMA-LAD and SVG-RPDA. Occluded grafts to OM1 and OM2 and medical treatment was advised  2-HTN  3-HLP: on statin  4-Ischemic Cardiomyopathy with chronic systolic CHF  5-S/P ICD LV lead is dislodged and currently off. Working as regular ICD not BI V ICD: with change out and LV lead was capped 11/2017. 6-Chronic atrial fibrillation: on anticoagulation. 7-Moderate to severe MR on echo as above. 8-Moderate TR with moderate to severe PHTN  9-Thyroid disease. 10- Mild carotid artery disease.     Plan of Treatment:      1-Continue current medical treatment with ASA, BB, ACEI, digoxin,Coumadin and statin. Continue compression stocking. Continue current dose of lasix. Change potassium to packet 20meq BID. Obtain BMP in one week. 2-Diet, exercise and loose weight. 3-Regular ICD follow up every 6 months.   4-She is DNR-CCA  5-RTC 3 month      Jose Mohan [id number]           [phone]

## 2019-04-12 NOTE — PROGRESS NOTES
Patient refusing all meds.   Patient refusing swish and spit all together they would like this d/c'd

## 2019-04-15 NOTE — PROGRESS NOTES
Notified Elda Mcfarland at [facility] with patients INR results and instructions

## 2019-04-17 NOTE — PROGRESS NOTES
Spoke with Ez Blankenship at [facility] and explained to her that this was not to be done today. It was scheduled for the 4/22/19    Notified Anupama Bose at [facility].

## 2019-04-23 NOTE — TELEPHONE ENCOUNTER
Spoke with nurse at [facility]. She states there are a few residents and a couple staff that have been having loose watery stools. They notified the health department who is asking for stool tests. Patient does not have a temp and no abdominal pain. No blood in the stool. Please advise.

## 2019-04-23 NOTE — TELEPHONE ENCOUNTER
Resident having loose watery stools. D/T having other cases in facility. Would you like a sample with cultures? How would you like to proceed?

## 2019-05-16 ENCOUNTER — TELEPHONE (OUTPATIENT)
Dept: INTERNAL MEDICINE | Age: 84
End: 2019-05-16
